# Patient Record
Sex: FEMALE | Race: WHITE | NOT HISPANIC OR LATINO | Employment: OTHER | URBAN - METROPOLITAN AREA
[De-identification: names, ages, dates, MRNs, and addresses within clinical notes are randomized per-mention and may not be internally consistent; named-entity substitution may affect disease eponyms.]

---

## 2017-05-23 ENCOUNTER — TRANSCRIBE ORDERS (OUTPATIENT)
Dept: ADMINISTRATIVE | Facility: HOSPITAL | Age: 82
End: 2017-05-23

## 2017-05-23 DIAGNOSIS — I35.0 AORTIC VALVE STENOSIS, UNSPECIFIED ETIOLOGY: Primary | ICD-10-CM

## 2017-06-21 ENCOUNTER — HOSPITAL ENCOUNTER (OUTPATIENT)
Dept: NON INVASIVE DIAGNOSTICS | Facility: HOSPITAL | Age: 82
Discharge: HOME | End: 2017-06-21
Payer: COMMERCIAL

## 2017-06-21 DIAGNOSIS — I35.0 AORTIC VALVE STENOSIS, UNSPECIFIED ETIOLOGY: ICD-10-CM

## 2017-06-21 PROCEDURE — 93306 TTE W/DOPPLER COMPLETE: CPT

## 2018-05-22 ENCOUNTER — TRANSCRIBE ORDERS (OUTPATIENT)
Dept: ADMINISTRATIVE | Facility: HOSPITAL | Age: 83
End: 2018-05-22

## 2018-05-22 DIAGNOSIS — I35.0 AORTIC VALVE STENOSIS, ETIOLOGY OF CARDIAC VALVE DISEASE UNSPECIFIED: Primary | ICD-10-CM

## 2018-06-07 ENCOUNTER — HOSPITAL ENCOUNTER (OUTPATIENT)
Dept: NON INVASIVE DIAGNOSTICS | Facility: HOSPITAL | Age: 83
Discharge: HOME | End: 2018-06-07
Payer: COMMERCIAL

## 2018-06-07 DIAGNOSIS — I35.0 AORTIC VALVE STENOSIS, ETIOLOGY OF CARDIAC VALVE DISEASE UNSPECIFIED: ICD-10-CM

## 2018-06-07 PROCEDURE — 93306 TTE W/DOPPLER COMPLETE: CPT

## 2018-06-07 PROCEDURE — 93306 TTE W/DOPPLER COMPLETE: CPT | Performed by: INTERNAL MEDICINE

## 2018-07-18 ENCOUNTER — TRANSCRIBE ORDERS (OUTPATIENT)
Dept: ADMINISTRATIVE | Facility: HOSPITAL | Age: 83
End: 2018-07-18

## 2018-07-18 ENCOUNTER — HOSPITAL ENCOUNTER (OUTPATIENT)
Dept: NON INVASIVE DIAGNOSTICS | Facility: HOSPITAL | Age: 83
Discharge: HOME | End: 2018-07-18
Payer: COMMERCIAL

## 2018-07-18 DIAGNOSIS — M79.606 PAIN OF LOWER EXTREMITY, UNSPECIFIED LATERALITY: ICD-10-CM

## 2018-07-18 DIAGNOSIS — R22.42 MASS OF LOWER LEG, LEFT: ICD-10-CM

## 2018-07-18 DIAGNOSIS — M79.606 PAIN OF LOWER EXTREMITY, UNSPECIFIED LATERALITY: Primary | ICD-10-CM

## 2018-07-18 PROCEDURE — 93971 EXTREMITY STUDY: CPT | Performed by: SURGERY

## 2018-07-18 PROCEDURE — 93971 EXTREMITY STUDY: CPT

## 2018-11-28 ENCOUNTER — TRANSCRIBE ORDERS (OUTPATIENT)
Dept: ADMINISTRATIVE | Facility: HOSPITAL | Age: 83
End: 2018-11-28

## 2018-11-28 ENCOUNTER — APPOINTMENT (OUTPATIENT)
Dept: LAB | Facility: CLINIC | Age: 83
End: 2018-11-28
Payer: COMMERCIAL

## 2018-11-28 DIAGNOSIS — I10 ESSENTIAL HYPERTENSION, MALIGNANT: Primary | ICD-10-CM

## 2018-11-28 DIAGNOSIS — K21.9 GASTROESOPHAGEAL REFLUX DISEASE, ESOPHAGITIS PRESENCE NOT SPECIFIED: ICD-10-CM

## 2018-11-28 DIAGNOSIS — I10 ESSENTIAL HYPERTENSION, MALIGNANT: ICD-10-CM

## 2018-11-28 LAB
ALBUMIN SERPL BCP-MCNC: 3.5 G/DL (ref 3.5–5)
ALP SERPL-CCNC: 74 U/L (ref 46–116)
ALT SERPL W P-5'-P-CCNC: 23 U/L (ref 12–78)
ANION GAP SERPL CALCULATED.3IONS-SCNC: 5 MMOL/L (ref 4–13)
AST SERPL W P-5'-P-CCNC: 30 U/L (ref 5–45)
BACTERIA UR QL AUTO: NORMAL /HPF
BASOPHILS # BLD AUTO: 0.04 THOUSANDS/ΜL (ref 0–0.1)
BASOPHILS NFR BLD AUTO: 0 % (ref 0–1)
BILIRUB SERPL-MCNC: 0.33 MG/DL (ref 0.2–1)
BILIRUB UR QL STRIP: NEGATIVE
BUN SERPL-MCNC: 20 MG/DL (ref 5–25)
CALCIUM SERPL-MCNC: 9.8 MG/DL (ref 8.3–10.1)
CHLORIDE SERPL-SCNC: 105 MMOL/L (ref 100–108)
CHOLEST SERPL-MCNC: 147 MG/DL (ref 50–200)
CLARITY UR: CLEAR
CO2 SERPL-SCNC: 26 MMOL/L (ref 21–32)
COLOR UR: YELLOW
CREAT SERPL-MCNC: 1.13 MG/DL (ref 0.6–1.3)
EOSINOPHIL # BLD AUTO: 0.35 THOUSAND/ΜL (ref 0–0.61)
EOSINOPHIL NFR BLD AUTO: 4 % (ref 0–6)
ERYTHROCYTE [DISTWIDTH] IN BLOOD BY AUTOMATED COUNT: 13.7 % (ref 11.6–15.1)
GFR SERPL CREATININE-BSD FRML MDRD: 43 ML/MIN/1.73SQ M
GLUCOSE P FAST SERPL-MCNC: 104 MG/DL (ref 65–99)
GLUCOSE UR STRIP-MCNC: NEGATIVE MG/DL
HCT VFR BLD AUTO: 40.9 % (ref 34.8–46.1)
HDLC SERPL-MCNC: 61 MG/DL (ref 40–60)
HGB BLD-MCNC: 12.9 G/DL (ref 11.5–15.4)
HGB UR QL STRIP.AUTO: NEGATIVE
HYALINE CASTS #/AREA URNS LPF: NORMAL /LPF
IMM GRANULOCYTES # BLD AUTO: 0.03 THOUSAND/UL (ref 0–0.2)
IMM GRANULOCYTES NFR BLD AUTO: 0 % (ref 0–2)
KETONES UR STRIP-MCNC: NEGATIVE MG/DL
LDLC SERPL CALC-MCNC: 73 MG/DL (ref 0–100)
LEUKOCYTE ESTERASE UR QL STRIP: ABNORMAL
LYMPHOCYTES # BLD AUTO: 4.29 THOUSANDS/ΜL (ref 0.6–4.47)
LYMPHOCYTES NFR BLD AUTO: 48 % (ref 14–44)
MCH RBC QN AUTO: 29.5 PG (ref 26.8–34.3)
MCHC RBC AUTO-ENTMCNC: 31.5 G/DL (ref 31.4–37.4)
MCV RBC AUTO: 93 FL (ref 82–98)
MONOCYTES # BLD AUTO: 0.5 THOUSAND/ΜL (ref 0.17–1.22)
MONOCYTES NFR BLD AUTO: 5 % (ref 4–12)
NEUTROPHILS # BLD AUTO: 3.97 THOUSANDS/ΜL (ref 1.85–7.62)
NEUTS SEG NFR BLD AUTO: 43 % (ref 43–75)
NITRITE UR QL STRIP: NEGATIVE
NON-SQ EPI CELLS URNS QL MICRO: NORMAL /HPF
NONHDLC SERPL-MCNC: 86 MG/DL
NRBC BLD AUTO-RTO: 0 /100 WBCS
PH UR STRIP.AUTO: 7 [PH] (ref 4.5–8)
PLATELET # BLD AUTO: 213 THOUSANDS/UL (ref 149–390)
PMV BLD AUTO: 8.9 FL (ref 8.9–12.7)
POTASSIUM SERPL-SCNC: 4.6 MMOL/L (ref 3.5–5.3)
PROT SERPL-MCNC: 7 G/DL (ref 6.4–8.2)
PROT UR STRIP-MCNC: NEGATIVE MG/DL
RBC # BLD AUTO: 4.38 MILLION/UL (ref 3.81–5.12)
RBC #/AREA URNS AUTO: NORMAL /HPF
SODIUM SERPL-SCNC: 136 MMOL/L (ref 136–145)
SP GR UR STRIP.AUTO: 1.01 (ref 1–1.03)
T4 FREE SERPL-MCNC: 1.05 NG/DL (ref 0.76–1.46)
TRIGL SERPL-MCNC: 64 MG/DL
TSH SERPL DL<=0.05 MIU/L-ACNC: 3.99 UIU/ML (ref 0.36–3.74)
UROBILINOGEN UR QL STRIP.AUTO: 0.2 E.U./DL
VIT B12 SERPL-MCNC: 952 PG/ML (ref 100–900)
WBC # BLD AUTO: 9.18 THOUSAND/UL (ref 4.31–10.16)
WBC #/AREA URNS AUTO: NORMAL /HPF

## 2018-11-28 PROCEDURE — 84443 ASSAY THYROID STIM HORMONE: CPT

## 2018-11-28 PROCEDURE — 80053 COMPREHEN METABOLIC PANEL: CPT

## 2018-11-28 PROCEDURE — 80061 LIPID PANEL: CPT

## 2018-11-28 PROCEDURE — 85025 COMPLETE CBC W/AUTO DIFF WBC: CPT

## 2018-11-28 PROCEDURE — 82607 VITAMIN B-12: CPT

## 2018-11-28 PROCEDURE — 84439 ASSAY OF FREE THYROXINE: CPT

## 2018-11-28 PROCEDURE — 82652 VIT D 1 25-DIHYDROXY: CPT

## 2018-11-28 PROCEDURE — 81001 URINALYSIS AUTO W/SCOPE: CPT | Performed by: FAMILY MEDICINE

## 2018-11-28 PROCEDURE — 36415 COLL VENOUS BLD VENIPUNCTURE: CPT

## 2018-11-29 LAB — 1,25(OH)2D3 SERPL-MCNC: 39.6 PG/ML (ref 19.9–79.3)

## 2019-02-01 ENCOUNTER — TRANSCRIBE ORDERS (OUTPATIENT)
Dept: ADMINISTRATIVE | Facility: HOSPITAL | Age: 84
End: 2019-02-01

## 2019-02-01 DIAGNOSIS — M48.07 STENOSIS OF LUMBOSACRAL SPINE: Primary | ICD-10-CM

## 2019-02-05 ENCOUNTER — HOSPITAL ENCOUNTER (OUTPATIENT)
Dept: MRI IMAGING | Facility: HOSPITAL | Age: 84
Discharge: HOME | End: 2019-02-05
Payer: COMMERCIAL

## 2019-02-05 DIAGNOSIS — M48.07 STENOSIS OF LUMBOSACRAL SPINE: ICD-10-CM

## 2019-02-05 PROCEDURE — 72148 MRI LUMBAR SPINE W/O DYE: CPT

## 2019-05-22 ENCOUNTER — TRANSCRIBE ORDERS (OUTPATIENT)
Dept: ADMINISTRATIVE | Facility: HOSPITAL | Age: 84
End: 2019-05-22

## 2019-05-22 DIAGNOSIS — I35.0 AORTIC VALVE STENOSIS, ETIOLOGY OF CARDIAC VALVE DISEASE UNSPECIFIED: Primary | ICD-10-CM

## 2020-02-04 ENCOUNTER — TRANSCRIBE ORDERS (OUTPATIENT)
Dept: ADMINISTRATIVE | Facility: HOSPITAL | Age: 85
End: 2020-02-04

## 2020-02-04 ENCOUNTER — APPOINTMENT (OUTPATIENT)
Dept: LAB | Facility: CLINIC | Age: 85
End: 2020-02-04
Payer: COMMERCIAL

## 2020-02-04 DIAGNOSIS — R73.01 IMPAIRED FASTING GLUCOSE: Primary | ICD-10-CM

## 2020-02-04 DIAGNOSIS — E78.5 HYPERLIPIDEMIA, UNSPECIFIED HYPERLIPIDEMIA TYPE: ICD-10-CM

## 2020-02-04 DIAGNOSIS — E03.9 MYXEDEMA HEART DISEASE: ICD-10-CM

## 2020-02-04 DIAGNOSIS — D64.9 ANEMIA, UNSPECIFIED TYPE: ICD-10-CM

## 2020-02-04 DIAGNOSIS — N39.0 URINARY TRACT INFECTION WITHOUT HEMATURIA, SITE UNSPECIFIED: ICD-10-CM

## 2020-02-04 DIAGNOSIS — I51.9 MYXEDEMA HEART DISEASE: ICD-10-CM

## 2020-02-04 DIAGNOSIS — R73.01 IMPAIRED FASTING GLUCOSE: ICD-10-CM

## 2020-02-04 LAB
25(OH)D3 SERPL-MCNC: 32.4 NG/ML (ref 30–100)
ALBUMIN SERPL BCP-MCNC: 3.8 G/DL (ref 3.5–5)
ALP SERPL-CCNC: 86 U/L (ref 46–116)
ALT SERPL W P-5'-P-CCNC: 28 U/L (ref 12–78)
ANION GAP SERPL CALCULATED.3IONS-SCNC: 7 MMOL/L (ref 4–13)
AST SERPL W P-5'-P-CCNC: 29 U/L (ref 5–45)
BASOPHILS # BLD AUTO: 0.04 THOUSANDS/ΜL (ref 0–0.1)
BASOPHILS NFR BLD AUTO: 1 % (ref 0–1)
BILIRUB SERPL-MCNC: 0.64 MG/DL (ref 0.2–1)
BILIRUB UR QL STRIP: NEGATIVE
BUN SERPL-MCNC: 18 MG/DL (ref 5–25)
CALCIUM SERPL-MCNC: 9.6 MG/DL (ref 8.3–10.1)
CHLORIDE SERPL-SCNC: 112 MMOL/L (ref 100–108)
CHOLEST SERPL-MCNC: 180 MG/DL (ref 50–200)
CLARITY UR: CLEAR
CO2 SERPL-SCNC: 26 MMOL/L (ref 21–32)
COLOR UR: YELLOW
CREAT SERPL-MCNC: 1.11 MG/DL (ref 0.6–1.3)
EOSINOPHIL # BLD AUTO: 0.27 THOUSAND/ΜL (ref 0–0.61)
EOSINOPHIL NFR BLD AUTO: 3 % (ref 0–6)
ERYTHROCYTE [DISTWIDTH] IN BLOOD BY AUTOMATED COUNT: 14.2 % (ref 11.6–15.1)
GFR SERPL CREATININE-BSD FRML MDRD: 44 ML/MIN/1.73SQ M
GLUCOSE P FAST SERPL-MCNC: 104 MG/DL (ref 65–99)
GLUCOSE UR STRIP-MCNC: NEGATIVE MG/DL
HCT VFR BLD AUTO: 42.5 % (ref 34.8–46.1)
HDLC SERPL-MCNC: 65 MG/DL
HGB BLD-MCNC: 13.2 G/DL (ref 11.5–15.4)
HGB UR QL STRIP.AUTO: NEGATIVE
IMM GRANULOCYTES # BLD AUTO: 0.02 THOUSAND/UL (ref 0–0.2)
IMM GRANULOCYTES NFR BLD AUTO: 0 % (ref 0–2)
KETONES UR STRIP-MCNC: NEGATIVE MG/DL
LDLC SERPL CALC-MCNC: 101 MG/DL (ref 0–100)
LEUKOCYTE ESTERASE UR QL STRIP: NEGATIVE
LYMPHOCYTES # BLD AUTO: 4.55 THOUSANDS/ΜL (ref 0.6–4.47)
LYMPHOCYTES NFR BLD AUTO: 52 % (ref 14–44)
MCH RBC QN AUTO: 29.3 PG (ref 26.8–34.3)
MCHC RBC AUTO-ENTMCNC: 31.1 G/DL (ref 31.4–37.4)
MCV RBC AUTO: 94 FL (ref 82–98)
MONOCYTES # BLD AUTO: 0.4 THOUSAND/ΜL (ref 0.17–1.22)
MONOCYTES NFR BLD AUTO: 5 % (ref 4–12)
NEUTROPHILS # BLD AUTO: 3.42 THOUSANDS/ΜL (ref 1.85–7.62)
NEUTS SEG NFR BLD AUTO: 39 % (ref 43–75)
NITRITE UR QL STRIP: NEGATIVE
NONHDLC SERPL-MCNC: 115 MG/DL
NRBC BLD AUTO-RTO: 0 /100 WBCS
PH UR STRIP.AUTO: 7 [PH]
PLATELET # BLD AUTO: 182 THOUSANDS/UL (ref 149–390)
PMV BLD AUTO: 9.2 FL (ref 8.9–12.7)
POTASSIUM SERPL-SCNC: 4.3 MMOL/L (ref 3.5–5.3)
PROT SERPL-MCNC: 7.1 G/DL (ref 6.4–8.2)
PROT UR STRIP-MCNC: NEGATIVE MG/DL
RBC # BLD AUTO: 4.5 MILLION/UL (ref 3.81–5.12)
SODIUM SERPL-SCNC: 145 MMOL/L (ref 136–145)
SP GR UR STRIP.AUTO: 1.02 (ref 1–1.03)
T4 SERPL-MCNC: 10.1 UG/DL (ref 4.7–13.3)
TRIGL SERPL-MCNC: 69 MG/DL
TSH SERPL DL<=0.05 MIU/L-ACNC: 2.97 UIU/ML (ref 0.36–3.74)
UROBILINOGEN UR QL STRIP.AUTO: 0.2 E.U./DL
VIT B12 SERPL-MCNC: 795 PG/ML (ref 100–900)
WBC # BLD AUTO: 8.7 THOUSAND/UL (ref 4.31–10.16)

## 2020-02-04 PROCEDURE — 85025 COMPLETE CBC W/AUTO DIFF WBC: CPT

## 2020-02-04 PROCEDURE — 82306 VITAMIN D 25 HYDROXY: CPT

## 2020-02-04 PROCEDURE — 84436 ASSAY OF TOTAL THYROXINE: CPT

## 2020-02-04 PROCEDURE — 36415 COLL VENOUS BLD VENIPUNCTURE: CPT

## 2020-02-04 PROCEDURE — 84443 ASSAY THYROID STIM HORMONE: CPT

## 2020-02-04 PROCEDURE — 81003 URINALYSIS AUTO W/O SCOPE: CPT | Performed by: FAMILY MEDICINE

## 2020-02-04 PROCEDURE — 80061 LIPID PANEL: CPT

## 2020-02-04 PROCEDURE — 80053 COMPREHEN METABOLIC PANEL: CPT

## 2020-02-04 PROCEDURE — 82607 VITAMIN B-12: CPT

## 2020-12-02 DIAGNOSIS — Z20.828 EXPOSURE TO SARS-ASSOCIATED CORONAVIRUS: ICD-10-CM

## 2020-12-02 PROCEDURE — U0003 INFECTIOUS AGENT DETECTION BY NUCLEIC ACID (DNA OR RNA); SEVERE ACUTE RESPIRATORY SYNDROME CORONAVIRUS 2 (SARS-COV-2) (CORONAVIRUS DISEASE [COVID-19]), AMPLIFIED PROBE TECHNIQUE, MAKING USE OF HIGH THROUGHPUT TECHNOLOGIES AS DESCRIBED BY CMS-2020-01-R: HCPCS | Performed by: NURSE PRACTITIONER

## 2020-12-05 LAB — SARS-COV-2 RNA SPEC QL NAA+PROBE: NOT DETECTED

## 2021-02-12 DIAGNOSIS — Z23 ENCOUNTER FOR IMMUNIZATION: ICD-10-CM

## 2022-11-07 ENCOUNTER — APPOINTMENT (EMERGENCY)
Dept: CT IMAGING | Facility: HOSPITAL | Age: 87
End: 2022-11-07

## 2022-11-07 ENCOUNTER — HOSPITAL ENCOUNTER (EMERGENCY)
Facility: HOSPITAL | Age: 87
Discharge: HOME | End: 2022-11-07
Attending: EMERGENCY MEDICINE

## 2022-11-07 ENCOUNTER — APPOINTMENT (EMERGENCY)
Dept: RADIOLOGY | Facility: HOSPITAL | Age: 87
End: 2022-11-07

## 2022-11-07 VITALS
OXYGEN SATURATION: 96 % | SYSTOLIC BLOOD PRESSURE: 148 MMHG | RESPIRATION RATE: 16 BRPM | HEART RATE: 68 BPM | BODY MASS INDEX: 25.32 KG/M2 | DIASTOLIC BLOOD PRESSURE: 72 MMHG | WEIGHT: 129 LBS | TEMPERATURE: 98 F | HEIGHT: 60 IN

## 2022-11-07 DIAGNOSIS — S42.91XA SHOULDER FRACTURE, RIGHT, CLOSED, INITIAL ENCOUNTER: ICD-10-CM
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
## 2022-11-07 DIAGNOSIS — S09.90XA INJURY OF HEAD, INITIAL ENCOUNTER: ICD-10-CM

## 2022-11-07 LAB
ATRIAL RATE: 70 BPM
P AXIS: 53 DEGREES
PR INTERVAL: 174 MS
QRS AXIS: -46 DEGREES
QRSD INTERVAL: 88 MS
QT INTERVAL: 422 MS
QTC INTERVAL: 455 MS
T WAVE AXIS: 84 DEGREES
VENTRICULAR RATE: 70 BPM

## 2022-11-07 RX ORDER — ACETAMINOPHEN 325 MG/1
975 TABLET ORAL ONCE
Status: COMPLETED | OUTPATIENT
Start: 2022-11-07 | End: 2022-11-07

## 2022-11-07 RX ADMIN — ACETAMINOPHEN 975 MG: 325 TABLET, FILM COATED ORAL at 13:36

## 2022-11-07 NOTE — ED PROVIDER NOTES
Emergency Department Trauma Note  Giorgio Smallwood 80 y o  female MRN: [id number]  Unit/Bed#: ED 12/ED 12 Encounter: [id number]      Trauma Alert: Trauma Acuity: Trauma Evaluation  Model of Arrival: Mode of Arrival: BLS via Trauma Squad Name and Number: [id number]  Trauma Team: Current Providers  Attending Provider: Radha Loving DO  Registered Nurse: Sina Guerrero RN  Consultants:     None      History of Present Illness     Chief Complaint:   Chief Complaint   Patient presents with   • Fall     Pt presents to ED from home after falling at home 1320 today      HPI:  Giorgio Smallwood is a 80 y o  female who presents with fall  Mechanism:Details of Incident: pt reports tripping over wash basket at home Injury Date: 11/07/22 Injury Time: 1320 Injury Occurence Location - [address] [place] [address] Way: [address] [facility]    Patient brought in by ambulance from home for fall just pta - tripped over laundry basket - did not hit head or pass out  Patient complains of right shoulder pain  Pain is toward back of shoulder worse with movement  She was assisted up by paramedics  She did not get anything yet for pain  GCS 15  Breath sounds equal  No crepitus or chest wall tenderness with compression over the chest  No pain or instability with compression over the pelvis  No bedside imaging required  Patient is stable to proceed to CT scan  Review of Systems   Constitutional: Negative for chills and fever  HENT: Negative for rhinorrhea and sore throat  Respiratory: Negative for shortness of breath  Cardiovascular: Negative for chest pain  Gastrointestinal: Negative for constipation, diarrhea, nausea and vomiting  Genitourinary: Negative for dysuria and frequency  Skin: Negative for rash  All other systems reviewed and are negative        Historical Information     Immunizations:   Immunization History   Administered Date(s) Administered   • COVID-19 MODERNA VACC 0 5 ML IM 01/26/2021, 03/05/2021       Past Medical History:   Diagnosis Date   • Cancer ([facility] [address] )     Cervical, Kidney, melanoma Per MRI Screening form 2/5/19     History reviewed  No pertinent family history  History reviewed  No pertinent surgical history  Social History     Tobacco Use   • Smoking status: Never Smoker   • Smokeless tobacco: Never Used   Substance Use Topics   • Drug use: Not Currently     E-Cigarette/Vaping     E-Cigarette/Vaping Substances   • Nicotine No    • THC No    • CBD No    • Flavoring No    • Other No    • Unknown No        Family History: non-contributory    Meds/Allergies   None       No Known Allergies    PHYSICAL EXAM    PE limited by: nothing    Objective   Vitals:   First set: Temperature: 98 °F (36 7 °C) (11/07/22 1315)  Pulse: 83 (11/07/22 1313)  Respirations: 16 (11/07/22 1313)  Blood Pressure: 148/72 (11/07/22 1315)  SpO2: 95 % (11/07/22 1313)    Primary Survey:   (A) Airway: patent  (B) Breathing: clear  (C) Circulation: Pulses:   normal  (D) Disabliity:  GCS Total:  15  (E) Expose:  Completed    Secondary Survey: (Click on Physical Exam tab above)  Physical Exam  Vitals and nursing note reviewed  Constitutional:       Appearance: She is well-developed  HENT:      Head: Normocephalic and atraumatic  Right Ear: External ear normal       Left Ear: External ear normal       Nose: Nose normal    Eyes:      Conjunctiva/sclera: Conjunctivae normal       Pupils: Pupils are equal, round, and reactive to light  Cardiovascular:      Rate and Rhythm: Normal rate and regular rhythm  Heart sounds: Normal heart sounds  Pulmonary:      Effort: Pulmonary effort is normal  No respiratory distress  Breath sounds: Normal breath sounds  No wheezing  Abdominal:      General: Bowel sounds are normal  There is no distension  Palpations: Abdomen is soft  Tenderness: There is no abdominal tenderness  Musculoskeletal:         General: No deformity  Right shoulder: Tenderness present  Decreased range of motion  Normal pulse  Left shoulder: Normal       Right elbow: Normal       Right wrist: Normal pulse  Cervical back: Normal range of motion and neck supple  No bony tenderness  No spinous process tenderness  Thoracic back: No bony tenderness  Lumbar back: No bony tenderness  Skin:     General: Skin is warm and dry  Findings: No rash  Neurological:      General: No focal deficit present  Mental Status: She is alert  GCS: GCS eye subscore is 4  GCS verbal subscore is 5  GCS motor subscore is 6  Sensory: No sensory deficit  Psychiatric:         Mood and Affect: Mood normal          Cervical spine cleared by clinical criteria? Yes     Invasive Devices  Report    None                 Lab Results:   Results Reviewed     None                 Imaging Studies:   Direct to CT: Yes  TRAUMA - CT head wo contrast   Final Result by Sindy Harmon MD (11/07 1426)      No acute intracranial abnormality  Chronic microangiopathic changes  Small scalp hematoma in right parietotemporal region  The study was marked in [facility] for immediate notification                    Workstation performed: [id number]         XR shoulder 2+ views RIGHT   ED Interpretation by Bhupendra Lima DO (11/07 1421)   Surgical neck humerus fx            Procedures  Procedures         ED Course           MDM  Number of Diagnoses or Management Options  Fall, initial encounter: new and requires workup  Injury of head, initial encounter: new and requires workup  Shoulder fracture, right, closed, initial encounter: new and requires workup     Amount and/or Complexity of Data Reviewed  Tests in the radiology section of CPT®: ordered and reviewed    Patient Progress  Patient progress: improved          Disposition  [facility] Transfer: No  Final diagnoses:   Fall, initial encounter   Injury of head, initial encounter   Shoulder fracture, right, closed, initial encounter     Time reflects when diagnosis was documented in both MDM as applicable and the Disposition within this note     Time User Action Codes Description Comment    11/7/2022  2:39 PM iTmmy Li Add [O87  NTEJ] Fall, initial encounter     11/7/2022  2:39 PM Timmy Li Add [Y59 [id number]] Injury of head, initial encounter     11/7/2022  2:39 PM Rafael, [address] Shoulder fracture, right, closed, initial encounter       ED Disposition     ED Disposition   Discharge    Condition   Stable    Date/Time   Mon Nov 7, 2022  2:39 PM    [address] discharge to home/self care  Follow-up Information     Follow up With Specialties Details Why Contact Info Additional [address] [facility] Specialists [facility] Orthopedic Surgery Schedule an appointment as soon as possible for a visit   [address] [place] [phone]  [address] [place] [address] Specialists [facility], [address] [place] [address], [facility] [address]        There are no discharge medications for this patient          PDMP Review     None          ED Provider  Electronically Signed by         Laurence Roberts DO  11/07/22 6627

## 2022-11-08 ENCOUNTER — OFFICE VISIT (OUTPATIENT)
Dept: OBGYN CLINIC | Facility: CLINIC | Age: 87
End: 2022-11-08

## 2022-11-08 VITALS
SYSTOLIC BLOOD PRESSURE: 130 MMHG | HEIGHT: 60 IN | DIASTOLIC BLOOD PRESSURE: 72 MMHG | WEIGHT: 129 LBS | BODY MASS INDEX: 25.32 KG/M2

## 2022-11-08 DIAGNOSIS — S42.201A CLOSED FRACTURE OF PROXIMAL END OF RIGHT HUMERUS, UNSPECIFIED FRACTURE MORPHOLOGY, INITIAL ENCOUNTER: Primary | ICD-10-CM

## 2022-11-08 DIAGNOSIS — S42.91XA SHOULDER FRACTURE, RIGHT, CLOSED, INITIAL ENCOUNTER: ICD-10-CM

## 2022-11-08 NOTE — RESULT ENCOUNTER NOTE
Attempted to call to inform of age-indeterminate posterior 8th rib fracture  No answer  LMOM  Has appt to see ortho today   Will cc results to ortho

## 2022-11-08 NOTE — PROGRESS NOTES
Assessment:     1  Closed fracture of proximal end of right humerus, unspecified fracture morphology, initial encounter    2  Shoulder fracture, right, closed, initial encounter        Plan:     Problem List Items Addressed This Visit        Musculoskeletal and Integument    Closed fracture of right proximal humerus - Primary     Findings are consistent with right proximal humerus fracture  I discussed with the patient that given the fracture alignment this should heal well non operatively  She should continue to wear the sling full-time and is able to take it off from time to time for gentle elbow range of motion exercises  I discussed with the patient that she can also put a pillow behind her shoulder as inclined will provide her increased comfort  She can continue to ice and use over-the-counter medication for pain relief  She is to follow up in approximately 3 weeks for re-evaluation and repeat x-rays of right shoulder which at that time he may begin gentle shoulder range of motion exercises including pendulums  All patient's questions were answered to their satisfaction  This note is created using dictation transcription  It may contain typographical errors, grammatical errors, improperly dictated words, background noise and other errors  Relevant Orders    Fracture / Dislocation Treatment      Other Visit Diagnoses     Shoulder fracture, right, closed, initial encounter              Subjective:     Patient ID: Gio Murdock is a 80 y o  female  Chief Complaint:  80year-old right-hand dominant female presents today for initial evaluation right shoulder pain  She presents in the office today with her daughter  She states yesterday 11/07/2022 she fell and landed on her right shoulder outstretched  She states that immediately following the fall she did feel a crack followed by significant pain    She did go to the emergency department following the fall who provided her with a sling which she has been compliant with and told her that she had a proximal humerus fracture  They also provided her with pain medication  She states that the pain has improved since yesterday  She does report some bruising and ecchymosis along the posterior aspect of her shoulder  She denies any numbness or tingling  She denies any prior injury or trauma to her shoulder  Information on patient's intake form was reviewed  Allergy:  No Known Allergies  Medications:  all current active meds have been reviewed  Past Medical History:  Past Medical History:   Diagnosis Date   • Cancer ([facility] [address] )     Cervical, Kidney, melanoma Per MRI Screening form 2/5/19     Past Surgical History:  History reviewed  No pertinent surgical history  Family History:  History reviewed  No pertinent family history  Social History:  Social History     Substance and Sexual Activity   Alcohol Use None     Social History     Substance and Sexual Activity   Drug Use Not Currently     Social History     Tobacco Use   Smoking Status Never Smoker   Smokeless Tobacco Never Used     Review of Systems   Constitutional: Negative for chills and fever  HENT: Negative for ear pain and sore throat  Eyes: Negative for pain and visual disturbance  Respiratory: Negative for cough and shortness of breath  Cardiovascular: Negative for chest pain and palpitations  Gastrointestinal: Negative for abdominal pain and vomiting  Genitourinary: Negative for dysuria and hematuria  Musculoskeletal: Positive for arthralgias (Right shoulder) and joint swelling (Right shoulder)  Negative for back pain  Skin: Negative for color change and rash  Neurological: Negative for seizures and syncope  Psychiatric/Behavioral: Negative  All other systems reviewed and are negative          Objective:  BP Readings from Last 1 Encounters:   11/08/22 130/72      Wt Readings from Last 1 Encounters:   11/08/22 58 5 kg (129 lb)      BMI:   Estimated body mass index is 25 19 kg/m² as calculated from the following:    Height as of this encounter: 5' (1 524 m)  Weight as of this encounter: 58 5 kg (129 lb)  BSA:   Estimated body surface area is 1 55 meters squared as calculated from the following:    Height as of this encounter: 5' (1 524 m)  Weight as of this encounter: 58 5 kg (129 lb)  Physical Exam  Vitals and nursing note reviewed  Constitutional:       Appearance: Normal appearance  She is well-developed  HENT:      Head: Normocephalic and atraumatic  Right Ear: External ear normal       Left Ear: External ear normal       Nose: Nose normal    Eyes:      Extraocular Movements: Extraocular movements intact  Conjunctiva/sclera: Conjunctivae normal    Pulmonary:      Effort: Pulmonary effort is normal    Musculoskeletal:      Cervical back: Neck supple  Comments: See HPI   Skin:     General: Skin is warm and dry  Neurological:      Mental Status: She is alert and oriented to person, place, and time  Deep Tendon Reflexes: Reflexes are normal and symmetric  Psychiatric:         Mood and Affect: Mood normal          Behavior: Behavior normal          Right Shoulder Exam     Tenderness   Right shoulder tenderness location: Humeral head  Other   Erythema: absent  Scars: absent  Sensation: normal  Pulse: present    Comments:  Strength and ROM not tested            I have personally reviewed pertinent films in PACS and my interpretation is Comminuted right proximal humerus neck and head intra-articular minimally displaced fracture  Fracture / Dislocation Treatment    Date/Time: 11/10/2022 10:35 AM  Performed by: Evert Guy MD  Authorized by: Evert Guy MD     Patient Location:  [facility]  [place] Protocol:  Consent: Verbal consent obtained    Risks and benefits: risks, benefits and alternatives were discussed  Consent given by: patient  Patient understanding: patient states understanding of the procedure being performed      Injury location: Shoulder  Location details:  Right shoulder  Injury type:  Fracture  Fracture type: anatomical neck    Neurovascular status: Neurovascularly intact    Distal perfusion: normal    Neurological function: normal    Range of motion: reduced    Local anesthesia used?: No    Manipulation performed?: No    Immobilization:  Sling  Neurovascular status: Neurovascularly intact    Distal perfusion: normal    Neurological function: normal    Range of motion: unchanged    Patient tolerance:  Patient tolerated the procedure well with no immediate complications

## 2022-11-08 NOTE — LETTER
November 11, 2022     Lasha Whitmore MD  [facility] [address]  [address]  [address]    Patient: Mariel Trejo   YOB: 1928   Date of Visit: 11/8/2022       Dear Dr Daily Ortiz: Thank you for referring Tirso Akers to me for evaluation  Below are my notes for this consultation  If you have questions, please do not hesitate to call me  I look forward to following your patient along with you  Sincerely,        Kelley Chin MD        CC: No Recipients  Kelley Chin MD  11/10/2022 10:36 AM  Signed  Assessment:     1  Closed fracture of proximal end of right humerus, unspecified fracture morphology, initial encounter    2  Shoulder fracture, right, closed, initial encounter        Plan:     Problem List Items Addressed This Visit        Musculoskeletal and Integument    Closed fracture of right proximal humerus - Primary     Findings are consistent with right proximal humerus fracture  I discussed with the patient that given the fracture alignment this should heal well non operatively  She should continue to wear the sling full-time and is able to take it off from time to time for gentle elbow range of motion exercises  I discussed with the patient that she can also put a pillow behind her shoulder as inclined will provide her increased comfort  She can continue to ice and use over-the-counter medication for pain relief  She is to follow up in approximately 3 weeks for re-evaluation and repeat x-rays of right shoulder which at that time he may begin gentle shoulder range of motion exercises including pendulums  All patient's questions were answered to their satisfaction  This note is created using dictation transcription  It may contain typographical errors, grammatical errors, improperly dictated words, background noise and other errors           Relevant Orders    Fracture / Dislocation Treatment      Other Visit Diagnoses     Shoulder fracture, right, closed, initial encounter              Subjective:     Patient ID: Blaire Walker is a 80 y o  female  Chief Complaint:  80year-old right-hand dominant female presents today for initial evaluation right shoulder pain  She presents in the office today with her daughter  She states yesterday 11/07/2022 she fell and landed on her right shoulder outstretched  She states that immediately following the fall she did feel a crack followed by significant pain  She did go to the emergency department following the fall who provided her with a sling which she has been compliant with and told her that she had a proximal humerus fracture  They also provided her with pain medication  She states that the pain has improved since yesterday  She does report some bruising and ecchymosis along the posterior aspect of her shoulder  She denies any numbness or tingling  She denies any prior injury or trauma to her shoulder  Information on patient's intake form was reviewed  Allergy:  No Known Allergies  Medications:  all current active meds have been reviewed  Past Medical History:  Past Medical History:   Diagnosis Date   • [facility]     Cervical, Kidney, melanoma Per MRI Screening form 2/5/19     Past Surgical History:  History reviewed  No pertinent surgical history  Family History:  History reviewed  No pertinent family history  Social History:  Social History     Substance and Sexual Activity   Alcohol Use None     Social History     Substance and Sexual Activity   Drug Use Not Currently     Social History     Tobacco Use   Smoking Status Never Smoker   Smokeless Tobacco Never Used     Review of Systems   Constitutional: Negative for chills and fever  HENT: Negative for ear pain and sore throat  Eyes: Negative for pain and visual disturbance  Respiratory: Negative for cough and shortness of breath  Cardiovascular: Negative for chest pain and palpitations     Gastrointestinal: Negative for abdominal pain and vomiting  Genitourinary: Negative for dysuria and hematuria  Musculoskeletal: Positive for arthralgias (Right shoulder) and joint swelling (Right shoulder)  Negative for back pain  Skin: Negative for color change and rash  Neurological: Negative for seizures and syncope  Psychiatric/Behavioral: Negative  All other systems reviewed and are negative  Objective:  BP Readings from Last 1 Encounters:   11/08/22 130/72      Wt Readings from Last 1 Encounters:   11/08/22 58 5 kg (129 lb)      BMI:   Estimated body mass index is 25 19 kg/m² as calculated from the following:    Height as of this encounter: 5' (1 524 m)  Weight as of this encounter: 58 5 kg (129 lb)  BSA:   Estimated body surface area is 1 55 meters squared as calculated from the following:    Height as of this encounter: 5' (1 524 m)  Weight as of this encounter: 58 5 kg (129 lb)  Physical Exam  Vitals and nursing note reviewed  Constitutional:       Appearance: Normal appearance  She is well-developed  HENT:      Head: Normocephalic and atraumatic  Right Ear: External ear normal       Left Ear: External ear normal       Nose: Nose normal    Eyes:      Extraocular Movements: Extraocular movements intact  Conjunctiva/sclera: Conjunctivae normal    Pulmonary:      Effort: Pulmonary effort is normal    Musculoskeletal:      Cervical back: Neck supple  Comments: See HPI   Skin:     General: Skin is warm and dry  Neurological:      Mental Status: She is alert and oriented to person, place, and time  Deep Tendon Reflexes: Reflexes are normal and symmetric  Psychiatric:         Mood and Affect: Mood normal          Behavior: Behavior normal          Right Shoulder Exam     Tenderness   Right shoulder tenderness location: Humeral head      Other   Erythema: absent  Scars: absent  Sensation: normal  Pulse: present    Comments:  Strength and ROM not tested            I have personally reviewed pertinent films in PACS and my interpretation is Comminuted right proximal humerus neck and head intra-articular minimally displaced fracture  Fracture / Dislocation Treatment    Date/Time: 11/10/2022 10:35 AM  Performed by: Kelley Chin MD  Authorized by: Kelley Chin MD     Patient Location:  [facility] Protocol:  Consent: Verbal consent obtained    Risks and benefits: risks, benefits and alternatives were discussed  Consent given by: patient  Patient understanding: patient states understanding of the procedure being performed      Injury location:  Shoulder  Location details:  Right shoulder  Injury type:  Fracture  Fracture type: anatomical neck    Neurovascular status: Neurovascularly intact    Distal perfusion: normal    Neurological function: normal    Range of motion: reduced    Local anesthesia used?: No    Manipulation performed?: No    Immobilization:  Sling  Neurovascular status: Neurovascularly intact    Distal perfusion: normal    Neurological function: normal    Range of motion: unchanged    Patient tolerance:  Patient tolerated the procedure well with no immediate complications

## 2022-11-10 PROBLEM — S42.201A CLOSED FRACTURE OF RIGHT PROXIMAL HUMERUS: Status: ACTIVE | Noted: 2022-11-10

## 2022-11-10 NOTE — ASSESSMENT & PLAN NOTE
Findings are consistent with right proximal humerus fracture  I discussed with the patient that given the fracture alignment this should heal well non operatively  She should continue to wear the sling full-time and is able to take it off from time to time for gentle elbow range of motion exercises  I discussed with the patient that she can also put a pillow behind her shoulder as inclined will provide her increased comfort  She can continue to ice and use over-the-counter medication for pain relief  She is to follow up in approximately 3 weeks for re-evaluation and repeat x-rays of right shoulder which at that time he may begin gentle shoulder range of motion exercises including pendulums  All patient's questions were answered to their satisfaction  This note is created using dictation transcription  It may contain typographical errors, grammatical errors, improperly dictated words, background noise and other errors

## 2022-12-01 ENCOUNTER — APPOINTMENT (OUTPATIENT)
Dept: RADIOLOGY | Facility: CLINIC | Age: 87
End: 2022-12-01

## 2022-12-01 ENCOUNTER — OFFICE VISIT (OUTPATIENT)
Dept: OBGYN CLINIC | Facility: CLINIC | Age: 87
End: 2022-12-01

## 2022-12-01 VITALS
BODY MASS INDEX: 24.94 KG/M2 | WEIGHT: 127 LBS | DIASTOLIC BLOOD PRESSURE: 80 MMHG | HEIGHT: 60 IN | SYSTOLIC BLOOD PRESSURE: 118 MMHG

## 2022-12-01 DIAGNOSIS — S42.201D CLOSED FRACTURE OF PROXIMAL END OF RIGHT HUMERUS WITH ROUTINE HEALING, UNSPECIFIED FRACTURE MORPHOLOGY, SUBSEQUENT ENCOUNTER: ICD-10-CM

## 2022-12-01 DIAGNOSIS — M25.531 PAIN IN RIGHT WRIST: ICD-10-CM

## 2022-12-01 DIAGNOSIS — S42.201D CLOSED FRACTURE OF PROXIMAL END OF RIGHT HUMERUS WITH ROUTINE HEALING, UNSPECIFIED FRACTURE MORPHOLOGY, SUBSEQUENT ENCOUNTER: Primary | ICD-10-CM

## 2022-12-01 DIAGNOSIS — M19.031 PRIMARY OSTEOARTHRITIS OF RIGHT WRIST: ICD-10-CM

## 2022-12-01 DIAGNOSIS — M18.11 ARTHRITIS OF CARPOMETACARPAL (CMC) JOINT OF RIGHT THUMB: ICD-10-CM

## 2022-12-01 NOTE — PROGRESS NOTES
Assessment:     1  Closed fracture of proximal end of right humerus with routine healing, unspecified fracture morphology, subsequent encounter    2  Arthritis of carpometacarpal (CMC) joint of right thumb    3  Primary osteoarthritis of right wrist    4  Pain in right wrist        Plan:     Problem List Items Addressed This Visit        Musculoskeletal and Integument    Closed fracture of right proximal humerus - Primary    Relevant Orders    XR shoulder 2+ vw right    Arthritis of carpometacarpal (CMC) joint of right thumb    Relevant Orders    Ambulatory Referral to PT/OT Hand Therapy    Primary osteoarthritis of right wrist    Relevant Orders    Ambulatory Referral to PT/OT Hand Therapy   Other Visit Diagnoses     Pain in right wrist        Relevant Orders    XR wrist 3+ vw right        Findings consistent with right proximal humerus fracture, right wrist, and right thumb CMC osteoarthritis  Discussed findings and treatment options with the patient  X-rays of the right shoulder was reviewed in the office today which showed fracture alignment maintained proximal humerus fracture  X-rays of the right wrist was reviewed in the office today as well which showed degenerative changes and no acute fractures or dislocations  Patient is to wear protective sling for the next 3 weeks  She was advised to come out of the sling and do pendulum exercises  She is to continue moving her right wrist and fingers to prevent stiffness  Hand therapy script was given out today for range of motion exercises to the right wrist to help with the pain  and swelling  Recommended patient to use Aspercreme and or Voltaren gel over the wrist for pain relief  Will repeat x-rays of the right shoulder at the next office visit  She is to follow up in 3 weeks  All patient's questions were answered to their satisfaction  This note is created using dictation transcription    It may contain typographical errors, grammatical errors, improperly dictated words, background noise and other errors  Subjective:     Patient ID: Blayne Cormier is a 80 y o  female  Chief Complaint:  80year old female presents today follow up for right proximal humerus fracture, DOI 11/7/22  She has been in the protective sling at all times and comes out of the sling to do range of motion exercises to the elbow  She states no pain in the shoulder region but states in the last couple of days she has been having swelling and pain in the wrist and fingers  She was icing at times  She is taking Tylenol for pain relief  Allergy:  No Known Allergies  Medications:  all current active meds have been reviewed  Past Medical History:  Past Medical History:   Diagnosis Date   • [facility]     Cervical, Kidney, melanoma Per MRI Screening form 2/5/19     Past Surgical History:  History reviewed  No pertinent surgical history  Family History:  History reviewed  No pertinent family history  Social History:  Social History     Substance and Sexual Activity   Alcohol Use None     Social History     Substance and Sexual Activity   Drug Use Not Currently     Social History     Tobacco Use   Smoking Status Never   Smokeless Tobacco Never     Review of Systems   Constitutional: Negative for chills and fever  HENT: Negative for ear pain and sore throat  Eyes: Negative for pain and visual disturbance  Respiratory: Negative for cough and shortness of breath  Cardiovascular: Negative for chest pain and palpitations  Gastrointestinal: Negative for abdominal pain and vomiting  Genitourinary: Negative for dysuria and hematuria  Musculoskeletal: Positive for arthralgias (Right wrist) and joint swelling (Right wrist and hand)  Skin: Negative for color change and rash  Neurological: Negative for seizures and syncope  Psychiatric/Behavioral: Negative  All other systems reviewed and are negative          Objective:  BP Readings from Last 1 Encounters:   12/01/22 118/80 Wt Readings from Last 1 Encounters:   12/01/22 57 6 kg (127 lb)      BMI:   Estimated body mass index is 24 8 kg/m² as calculated from the following:    Height as of this encounter: 5' (1 524 m)  Weight as of this encounter: 57 6 kg (127 lb)  BSA:   Estimated body surface area is 1 54 meters squared as calculated from the following:    Height as of this encounter: 5' (1 524 m)  Weight as of this encounter: 57 6 kg (127 lb)  Physical Exam  Vitals and nursing note reviewed  Constitutional:       Appearance: Normal appearance  She is well-developed and well-nourished  HENT:      Head: Normocephalic and atraumatic  Right Ear: External ear normal       Left Ear: External ear normal    Eyes:      Extraocular Movements: Extraocular movements intact  Conjunctiva/sclera: Conjunctivae normal    Pulmonary:      Effort: Pulmonary effort is normal    Musculoskeletal:      Cervical back: Neck supple  Skin:     General: Skin is warm and dry  Neurological:      Mental Status: She is alert and oriented to person, place, and time  Deep Tendon Reflexes: Reflexes are normal and symmetric  Psychiatric:         Mood and Affect: Mood and affect and mood normal          Behavior: Behavior normal        Right Hand Exam     Tenderness   The patient is experiencing tenderness in the dorsal area (Right thumb CMC )  Range of Motion   Wrist   Extension: abnormal Right wrist extension: Pain  Flexion: abnormal Right wrist flexion: Pain  Pronation: normal   Supination: abnormal Right wrist supination: Pain  Other   Erythema: absent  Sensation: normal  Pulse: present    Comments:  Generalized swelling wrist and finger region       Right Shoulder Exam     Tenderness   The patient is experiencing no tenderness      Other   Erythema: absent  Sensation: normal  Pulse: present    Comments:  Range of motion and strength not tested due to known fracture            I have personally reviewed pertinent films in PACS and my interpretation is x-ray right shoulder demonstrate proximal humerus fractures,, alignment maintained  x-ray right wrist demonstrates degernative changes, CMC arthritis and no acute fractures or dislocations        Scribe Attestation    I,:  Vern Shaver am acting as a scribe while in the presence of the attending physician :       I,:  Manisha Dangelo MD personally performed the services described in this documentation    as scribed in my presence :

## 2022-12-06 ENCOUNTER — EVALUATION (OUTPATIENT)
Dept: OCCUPATIONAL THERAPY | Facility: CLINIC | Age: 87
End: 2022-12-06

## 2022-12-06 DIAGNOSIS — M18.11 ARTHRITIS OF CARPOMETACARPAL (CMC) JOINT OF RIGHT THUMB: ICD-10-CM

## 2022-12-06 DIAGNOSIS — M19.031 PRIMARY OSTEOARTHRITIS OF RIGHT WRIST: Primary | ICD-10-CM

## 2022-12-06 NOTE — PROGRESS NOTES
OT Evaluation     Today's date: 2022  Patient name: Cate Springer  : 3/12/1928  MRN: [id number]  Referring provider: Linn Kehr, MD  Dx:   Encounter Diagnosis     ICD-10-CM    1  Arthritis of carpometacarpal [facility]) joint of right thumb  M18 11 Ambulatory Referral to PT/OT Hand Therapy      2  Primary osteoarthritis of right wrist  M19 031 Ambulatory Referral to PT/OT Hand Therapy                     Assessment  Assessment details: Lissett Leyva presents with R wrist and CMC OA that worsened after a fall where she fx her humerus   She is wearing a sling that she said she is supposed to wear 3 more weeks   She is doing pendulum  Exercises  She has decreased wrist , digit and thumb ROM   She has Aia 16 contracture with MCP hyperext  She has limited dexterity and gross grasp   She is unable to use R for ADL   She lives alone  in an apartment   Her daughter comes over to assist in bathing and dressing  She is limited by her acute fx as well as OA  She would benefit from OT to improve ROM, decrease edema , improve strength and learn to use adaptive devices   She should benefit from a Aia 16 orthosis to reduce stress from th sling and a compression glove    Impairments: abnormal or restricted ROM, impaired physical strength, lacks appropriate home exercise program and pain with function  Functional limitations: unable to use R for self care, meal prep , housekeeping , opening bottles , jars  Symptom irritability: moderateUnderstanding of Dx/Px/POC: fair   Prognosis: fair    Goals  STG- 1 wk  1-I with HEP  2- improve digit ROM 1 cm to [facility]    LTG- 4 wks  1- I ADL - bottles , jars using oper  2- worst pain 2/10  3- improve R  to 8#  4- improve R pinches to 3#      Plan  Patient would benefit from: OT eval and custom splinting  Planned modality interventions: cryotherapy and thermotherapy: hydrocollator packs  Planned therapy interventions: activity modification, manual therapy, massage, joint mobilization, stretching, fine motor coordination training, home exercise program and orthotic fitting/training  Frequency: 2x week  Duration in weeks: 5  Plan of Care beginning date: 2022  Treatment plan discussed with: patient        Subjective Evaluation    History of Present Illness  Date of onset: 2022  Mechanism of injury: trauma  Mechanism of injury: Ragini fell and Fx R humerus   She started having hand and wrist pain after falling   She was found to have wrist and CMC OA    Quality of life: good    Pain  Current pain ratin  At best pain ratin  At worst pain ratin  Location: R wrist and thumb CMC  Quality: dull ache  Relieving factors: ice  Progression: improved    Social Support  Steps to enter house: no  Stairs in house: no   Lives in: apartment  Lives with: alone    Employment status: not working  Hand dominance: right    Treatments  No previous or current treatments  Patient Goals  Patient goals for therapy: decreased pain, decreased edema, increased strength, independence with ADLs/IADLs and increased motion          Objective     Observations     Additional Observation Details  Gross edema R hand ; DJD IP jts ; thumb in swan neck     Active Range of Motion     Right Wrist   Wrist flexion: 25 degrees   Wrist extension: 30 degrees   Radial deviation: 10 degrees   Ulnar deviation: 20 degrees     Right Thumb   Flexion     MP: 0    DIP: 10  Extension     MP: -40  Radial Abduction    CMC: 40  Opposition: CMC contracture in flexion with hyperext at MCP   OPP tip of middle    Additional Active Range of Motion Details  R P/S 60//50  Digit flexion 3 cm to [facility]     Strength/Myotome Testing     Left Wrist/Hand      (2nd hand position)     Trial 1: 18    Thumb Strength  Key/Lateral Pinch     Trial 1: 7  Palmar/Three-Point Pinch     Trial 1: 2    Right Wrist/Hand      (2nd hand position)     Trial 1: 0    Thumb Strength   Key/Lateral Pinch     Trial 1: 1  Palmar/Three-Point Pinch     Trial 1: 0    Tests     Left Wrist/Hand   Positive extrinsic extensor tightness and intrinsic muscle tightness       Swelling     Left Wrist/Hand   Circumference MCP: 18 6 cm  Circumference wrist: 15 5 cm    Right Wrist/Hand   Circumference MCP: 19 cm  Circumference wrist: 16 2 cm    General Comments:      Shoulder Comments   Shoulder in a sling              Precautions: hx of cancer; kidney removed       Manuals 12/6            graston wrist/hand 6m            MOB wrist /CMC 4m            Retrograde  4m                         HEP  4m            Behavior MOD discussed            CMC orthosis 5m                                                                             Ther Ex                                                                                                                     Ther Activity                                       Gait Training                                       Modalities              8m

## 2022-12-13 ENCOUNTER — OFFICE VISIT (OUTPATIENT)
Dept: OCCUPATIONAL THERAPY | Facility: CLINIC | Age: 87
End: 2022-12-13

## 2022-12-13 DIAGNOSIS — M18.11 ARTHRITIS OF CARPOMETACARPAL (CMC) JOINT OF RIGHT THUMB: Primary | ICD-10-CM

## 2022-12-13 DIAGNOSIS — M19.031 PRIMARY OSTEOARTHRITIS OF RIGHT WRIST: ICD-10-CM

## 2022-12-13 NOTE — PROGRESS NOTES
Daily Note     Today's date: 2022  Patient name: Lillian Ramos  : 3/12/1928  MRN: [id number]  Referring provider: Oliverio Garner MD  Dx:   Encounter Diagnosis     ICD-10-CM    1  Arthritis of carpometacarpal (CMC) joint of right thumb  M18 11       2  Primary osteoarthritis of right wrist  M19 031                      Subjective: I am using the compression glove         Objective: See treatment diary below      Assessment: Tolerated treatment fair  Patient has significant edema  Plan: Continue per plan of care        Precautions: hx of cancer; kidney removed       Manuals            graston wrist/hand 6m 6m           MOB wrist /CMC 4m 4m           Retrograde  4m 4m                        HEP  4m            Behavior MOD discussed            CMC orthosis 5m                                                                             Ther Ex             TGE  2x10           Blocking PIP  2x10           A/PROM thumb   4m           A/PROM   Wrist   4m                                                               Ther Activity                                       Gait Training                                       Modalities             MH 8m 8m

## 2022-12-22 ENCOUNTER — OFFICE VISIT (OUTPATIENT)
Dept: OBGYN CLINIC | Facility: CLINIC | Age: 87
End: 2022-12-22

## 2022-12-22 ENCOUNTER — APPOINTMENT (OUTPATIENT)
Dept: RADIOLOGY | Facility: CLINIC | Age: 87
End: 2022-12-22

## 2022-12-22 VITALS
SYSTOLIC BLOOD PRESSURE: 124 MMHG | BODY MASS INDEX: 24.35 KG/M2 | DIASTOLIC BLOOD PRESSURE: 80 MMHG | WEIGHT: 124 LBS | HEIGHT: 60 IN

## 2022-12-22 DIAGNOSIS — M18.11 ARTHRITIS OF CARPOMETACARPAL (CMC) JOINT OF RIGHT THUMB: ICD-10-CM

## 2022-12-22 DIAGNOSIS — M19.031 PRIMARY OSTEOARTHRITIS OF RIGHT WRIST: ICD-10-CM

## 2022-12-22 DIAGNOSIS — S42.201D CLOSED FRACTURE OF PROXIMAL END OF RIGHT HUMERUS WITH ROUTINE HEALING, UNSPECIFIED FRACTURE MORPHOLOGY, SUBSEQUENT ENCOUNTER: Primary | ICD-10-CM

## 2022-12-22 DIAGNOSIS — S42.201D CLOSED FRACTURE OF PROXIMAL END OF RIGHT HUMERUS WITH ROUTINE HEALING, UNSPECIFIED FRACTURE MORPHOLOGY, SUBSEQUENT ENCOUNTER: ICD-10-CM

## 2022-12-22 RX ORDER — LOSARTAN POTASSIUM 100 MG/1
100 TABLET ORAL DAILY
COMMUNITY
Start: 2022-11-10

## 2022-12-22 RX ORDER — OMEPRAZOLE 20 MG/1
20 CAPSULE, DELAYED RELEASE ORAL DAILY
COMMUNITY
Start: 2022-11-16

## 2022-12-22 NOTE — PROGRESS NOTES
Assessment:     1  Closed fracture of proximal end of right humerus with routine healing, unspecified fracture morphology, subsequent encounter    2  Arthritis of carpometacarpal (CMC) joint of right thumb    3  Primary osteoarthritis of right wrist        Plan:     Problem List Items Addressed This Visit        Musculoskeletal and Integument    Closed fracture of right proximal humerus - Primary    Relevant Orders    XR shoulder 2+ vw right    Arthritis of carpometacarpal (CMC) joint of right thumb    Primary osteoarthritis of right wrist       Patient with right proximal humerus fracture, DOI 11/7/22 and right wrist arthritis  Patient is better  X-ray of right shoulder shows evedince of callus formation over fracture site  She is to discontinue wearing protective sling  Physical therapy order was given out for the range of motion and elbow range of motion exercises  Continue using Voltaren gel as needed for pain relief  Will repeat x-rays at the next office visit of the right shoulder  She is to follow up in 6-8 weeks for re evaluation  All patient's questions were answered to their satisfaction  This note is created using dictation transcription  It may contain typographical errors, grammatical errors, improperly dictated words, background noise and other errors  Subjective:     Patient ID: Winda Eisenmenger is a 80 y o  female  Chief Complaint:  80year old female presents today follow up for  right proximal humerus fracture, DOI 11/7/22, right wrist, and right thumb CMC osteoarthritis  She states she is doing better since the last office visit  She is wearing protective sling at all times  She did go to physical therapy twice for the right wrist and has been doing the exercises at home  She also as been using Voltaren gel on the right thumb and wrist region with relief       Allergy:  No Known Allergies  Medications:  all current active meds have been reviewed  Past Medical History:  Past Medical History: Diagnosis Date   • Cancer [facility])     Cervical, Kidney, melanoma Per MRI Screening form 2/5/19     Past Surgical History:  History reviewed  No pertinent surgical history  Family History:  History reviewed  No pertinent family history  Social History:  Social History     Substance and Sexual Activity   Alcohol Use None     Social History     Substance and Sexual Activity   Drug Use Not Currently     Social History     Tobacco Use   Smoking Status Never   Smokeless Tobacco Never     Review of Systems   Constitutional: Negative for chills and fever  HENT: Negative for ear pain and sore throat  Eyes: Negative for pain and visual disturbance  Respiratory: Negative for cough and shortness of breath  Cardiovascular: Negative for chest pain and palpitations  Gastrointestinal: Negative for abdominal pain and vomiting  Genitourinary: Negative for dysuria and hematuria  Musculoskeletal: Positive for arthralgias (Right wrist) and joint swelling (Right wrist)  Negative for gait problem  Skin: Negative for color change and rash  Neurological: Negative for seizures and syncope  Psychiatric/Behavioral: Negative  All other systems reviewed and are negative  Objective:  BP Readings from Last 1 Encounters:   12/22/22 124/80      Wt Readings from Last 1 Encounters:   12/22/22 56 2 kg (124 lb)      BMI:   Estimated body mass index is 24 22 kg/m² as calculated from the following:    Height as of this encounter: 5' (1 524 m)  Weight as of this encounter: 56 2 kg (124 lb)  BSA:   Estimated body surface area is 1 52 meters squared as calculated from the following:    Height as of this encounter: 5' (1 524 m)  Weight as of this encounter: 56 2 kg (124 lb)  Physical Exam  Vitals and nursing note reviewed  Constitutional:       Appearance: Normal appearance  She is well-developed  HENT:      Head: Normocephalic and atraumatic        Right Ear: External ear normal       Left Ear: External ear normal    Eyes:      Extraocular Movements: Extraocular movements intact  Conjunctiva/sclera: Conjunctivae normal    Pulmonary:      Effort: Pulmonary effort is normal    Musculoskeletal:      Cervical back: Neck supple  Skin:     General: Skin is warm and dry  Neurological:      Mental Status: She is alert and oriented to person, place, and time  Deep Tendon Reflexes: Reflexes are normal and symmetric  Psychiatric:         Mood and Affect: Mood normal          Behavior: Behavior normal        Right Hand Exam     Tenderness   The patient is experiencing tenderness in the dorsal area (Carpal joint)  Range of Motion   The patient has normal right wrist ROM  Other   Erythema: absent  Sensation: normal  Pulse: present    Comments:  Mild swelling over the wrist  No tenderness over the [facility] joint of the thumb      Right Shoulder Exam     Tenderness   The patient is experiencing no tenderness  Range of Motion   Active abduction: abnormal   Passive abduction: abnormal   Forward flexion: abnormal   Internal rotation 0 degrees: abnormal     Muscle Strength   Abduction: 3/5   Internal rotation: 4/5   External rotation: 3/5     Tests   Drop arm: negative    Other   Erythema: absent  Scars: absent  Sensation: normal  Pulse: present    Comments:  No pain with passive abduction             I have personally reviewed pertinent films in PACS and my interpretation is x-ray right shoulder demonstrates proximal  humerus fracture, evidence of healing over fracture site, fracture alignment maintained       Scribe Attestation    I,:  Gela Soni am acting as a scribe while in the presence of the attending physician :       I,:  Maria Luisa Barron MD personally performed the services described in this documentation    as scribed in my presence :

## 2022-12-29 ENCOUNTER — EVALUATION (OUTPATIENT)
Dept: PHYSICAL THERAPY | Facility: CLINIC | Age: 87
End: 2022-12-29

## 2022-12-29 DIAGNOSIS — S42.201D CLOSED FRACTURE OF PROXIMAL END OF RIGHT HUMERUS WITH ROUTINE HEALING, UNSPECIFIED FRACTURE MORPHOLOGY, SUBSEQUENT ENCOUNTER: ICD-10-CM

## 2022-12-29 NOTE — PROGRESS NOTES
PT Evaluation     Today's date: 2022  Patient name: Glena Crigler  : 3/12/1928  MRN: [id number]  Referring provider: Malinda Painter MD  Dx:   Encounter Diagnosis     ICD-10-CM    1  Closed fracture of proximal end of right humerus with routine healing, unspecified fracture morphology, subsequent encounter  S42 201D Ambulatory Referral to Physical Therapy                     Assessment  Assessment details: Pt is a 80 y o  female presenting to PT s/p R proximal humerus fx treated conservatively  PT findings include: R shoulder ROM restrictions, pain, likely strength deficits expected s/p R humerus fracture  Pt educated on findings, anatomy, biomechanics, POC and rehab course  Pt educated that focus initially will be placed on ROM and strengthening upon acceptable ROM  Pt with verbal agreement and understanding  Pt will benefit from skilled PT to address above impairments to return to PLOF with less restriction and to reach functional goals  Impairments: abnormal or restricted ROM, impaired physical strength, lacks appropriate home exercise program, pain with function and poor posture     Symptom irritability: lowUnderstanding of Dx/Px/POC: good   Prognosis: good    Goals  1  Pt will demonstrate understanding of HEP and POC in order to improve compliance with course of rehab in 2 weeks  2  Pt will demonstrate awareness of appropriate posture with seated, standing and functional dynamic reaching activities in order to decrease excessive loads/pain with ADL's in 3 weeks  3  Pt will demonstrate improved shoulder ROM to at least 120° of flexion to allow pt to functionally reach overhead shelves by d/c    4  Pt's pain will decrease to less than 2/10 to allow pt to return to PLOF by d/c       Plan  Patient would benefit from: skilled physical therapy  Planned therapy interventions: manual therapy, joint mobilization, neuromuscular re-education, patient education, postural training, strengthening, stretching, therapeutic activities, therapeutic exercise, home exercise program, flexibility and functional ROM exercises  Frequency: 2x week  Duration in weeks: 8  Treatment plan discussed with: patient        Subjective Evaluation    History of Present Illness  Mechanism of injury: Pt states that she tripped over her laundry basket 22  She visited ER with finding of fracture in the shoulder  She has been following with orthopedic surgery, treated conservatively  She was in sling since that period but recently discharged  She states that she has been taking it lightly with the R shoulder  She has seen OT for wrist pain but performing exercises on her own  Pt is following up with Dr Kirk Lewis 11  Quality of life: good    Pain  Current pain ratin  At best pain ratin  At worst pain ratin  Location: R shoulder  Quality: throbbing, tight and dull ache  Relieving factors: relaxation, rest, heat and support    Patient Goals  Patient goals for therapy: decreased edema, decreased pain, increased motion and increased strength          Objective    Shoulder ROM:  Shoulder flex: 90° (empty end feel)  Shoulder ABD: 50°(empty end feel)  Shoulder ER 0° ABD: 45°    Strength:   Deferred secondary to s/p fracture    Posture:   Kyphosis thoracic       Precautions: None      Manuals             Shoulder PROM DL                                                   Neuro Re-Ed                                                                                                        Ther Ex             Table slides Flex/abd 6x30" ea   HEP            Scapular retraction HEP            Pulley             Supine AAROM shoulder  Flex/ER with cane                                                                Ther Activity                                       Gait Training                                       Modalities

## 2023-01-01 ENCOUNTER — APPOINTMENT (INPATIENT)
Dept: RADIOLOGY | Facility: HOSPITAL | Age: 88
DRG: 853 | End: 2023-01-01
Payer: COMMERCIAL

## 2023-01-01 ENCOUNTER — HOSPICE ADMISSION (OUTPATIENT)
Dept: HOME HOSPICE | Facility: HOSPICE | Age: 88
End: 2023-01-01
Payer: MEDICARE

## 2023-01-01 ENCOUNTER — HOME CARE VISIT (OUTPATIENT)
Dept: HOME HEALTH SERVICES | Facility: HOME HEALTHCARE | Age: 88
End: 2023-01-01
Payer: MEDICARE

## 2023-01-01 ENCOUNTER — ANESTHESIA (INPATIENT)
Dept: PERIOP | Facility: HOSPITAL | Age: 88
DRG: 853 | End: 2023-01-01
Payer: COMMERCIAL

## 2023-01-01 ENCOUNTER — APPOINTMENT (EMERGENCY)
Dept: RADIOLOGY | Facility: HOSPITAL | Age: 88
DRG: 853 | End: 2023-01-01
Payer: COMMERCIAL

## 2023-01-01 ENCOUNTER — APPOINTMENT (INPATIENT)
Dept: NON INVASIVE DIAGNOSTICS | Facility: HOSPITAL | Age: 88
DRG: 853 | End: 2023-01-01
Payer: COMMERCIAL

## 2023-01-01 ENCOUNTER — HOSPITAL ENCOUNTER (INPATIENT)
Facility: HOSPITAL | Age: 88
LOS: 12 days | DRG: 853 | End: 2023-07-23
Attending: EMERGENCY MEDICINE | Admitting: SURGERY
Payer: COMMERCIAL

## 2023-01-01 ENCOUNTER — APPOINTMENT (OUTPATIENT)
Dept: RADIOLOGY | Facility: HOSPITAL | Age: 88
DRG: 853 | End: 2023-01-01
Payer: COMMERCIAL

## 2023-01-01 ENCOUNTER — ANESTHESIA EVENT (INPATIENT)
Dept: PERIOP | Facility: HOSPITAL | Age: 88
DRG: 853 | End: 2023-01-01
Payer: COMMERCIAL

## 2023-01-01 VITALS
DIASTOLIC BLOOD PRESSURE: 56 MMHG | WEIGHT: 121.6 LBS | TEMPERATURE: 97.8 F | RESPIRATION RATE: 25 BRPM | SYSTOLIC BLOOD PRESSURE: 111 MMHG | HEIGHT: 60 IN | BODY MASS INDEX: 23.87 KG/M2 | OXYGEN SATURATION: 96 % | HEART RATE: 94 BPM
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
## 2023-01-01 DIAGNOSIS — I35.0 NONRHEUMATIC AORTIC VALVE STENOSIS: ICD-10-CM

## 2023-01-01 DIAGNOSIS — S32.10XA SACRAL FRACTURE (HCC): ICD-10-CM

## 2023-01-01 DIAGNOSIS — M79.89 NECROTIZING SOFT TISSUE INFECTION: Primary | ICD-10-CM

## 2023-01-01 DIAGNOSIS — K80.00 CALCULUS OF GALLBLADDER WITH ACUTE CHOLECYSTITIS WITHOUT OBSTRUCTION: ICD-10-CM

## 2023-01-01 DIAGNOSIS — N39.0 UTI (URINARY TRACT INFECTION): ICD-10-CM

## 2023-01-01 DIAGNOSIS — R65.10 SIRS (SYSTEMIC INFLAMMATORY RESPONSE SYNDROME) (HCC): ICD-10-CM

## 2023-01-01 DIAGNOSIS — M72.6 NECROTIZING FASCIITIS (HCC): ICD-10-CM

## 2023-01-01 DIAGNOSIS — S32.120A CLOSED NONDISPLACED ZONE II FRACTURE OF SACRUM, INITIAL ENCOUNTER (HCC): ICD-10-CM

## 2023-01-01 DIAGNOSIS — R77.8 ELEVATED TROPONIN: ICD-10-CM

## 2023-01-01 DIAGNOSIS — E87.1 HYPONATREMIA: ICD-10-CM

## 2023-01-01 LAB
2HR DELTA HS TROPONIN: -13 NG/L
2HR DELTA HS TROPONIN: 22 NG/L
4HR DELTA HS TROPONIN: 107 NG/L
ABO GROUP BLD BPU: NORMAL
ABO GROUP BLD BPU: NORMAL
ABO GROUP BLD: NORMAL
ALBUMIN SERPL BCP-MCNC: 1.6 G/DL (ref 3.5–5)
ALBUMIN SERPL BCP-MCNC: 1.6 G/DL (ref 3.5–5)
ALBUMIN SERPL BCP-MCNC: 1.7 G/DL (ref 3.5–5)
ALBUMIN SERPL BCP-MCNC: 1.8 G/DL (ref 3.5–5)
ALBUMIN SERPL BCP-MCNC: 1.9 G/DL (ref 3.5–5)
ALBUMIN SERPL BCP-MCNC: 2.1 G/DL (ref 3.5–5)
ALP SERPL-CCNC: 129 U/L (ref 46–116)
ALP SERPL-CCNC: 145 U/L (ref 46–116)
ALP SERPL-CCNC: 226 U/L (ref 46–116)
ALP SERPL-CCNC: 278 U/L (ref 46–116)
ALP SERPL-CCNC: 352 U/L (ref 46–116)
ALT SERPL W P-5'-P-CCNC: 24 U/L (ref 12–78)
ALT SERPL W P-5'-P-CCNC: 33 U/L (ref 12–78)
ALT SERPL W P-5'-P-CCNC: 36 U/L (ref 12–78)
ALT SERPL W P-5'-P-CCNC: 38 U/L (ref 12–78)
ALT SERPL W P-5'-P-CCNC: 50 U/L (ref 12–78)
ANION GAP SERPL CALCULATED.3IONS-SCNC: 10 MMOL/L
ANION GAP SERPL CALCULATED.3IONS-SCNC: 4 MMOL/L
ANION GAP SERPL CALCULATED.3IONS-SCNC: 5 MMOL/L
ANION GAP SERPL CALCULATED.3IONS-SCNC: 6 MMOL/L
ANION GAP SERPL CALCULATED.3IONS-SCNC: 7 MMOL/L
ANION GAP SERPL CALCULATED.3IONS-SCNC: 8 MMOL/L
ANION GAP SERPL CALCULATED.3IONS-SCNC: 9 MMOL/L
ANISOCYTOSIS BLD QL SMEAR: PRESENT
AORTIC ROOT: 2.7 CM
AORTIC VALVE MEAN VELOCITY: 28.6 M/S
APICAL FOUR CHAMBER EJECTION FRACTION: 40 %
ASCENDING AORTA: 2.9 CM
AST SERPL W P-5'-P-CCNC: 27 U/L (ref 5–45)
AST SERPL W P-5'-P-CCNC: 42 U/L (ref 5–45)
AST SERPL W P-5'-P-CCNC: 49 U/L (ref 5–45)
AST SERPL W P-5'-P-CCNC: 50 U/L (ref 5–45)
AST SERPL W P-5'-P-CCNC: 80 U/L (ref 5–45)
ATRIAL RATE: 109 BPM
ATRIAL RATE: 58 BPM
ATRIAL RATE: 62 BPM
ATRIAL RATE: 64 BPM
ATRIAL RATE: 77 BPM
ATRIAL RATE: 93 BPM
AV AREA BY CONTINUOUS VTI: 0.5 CM2
AV AREA PEAK VELOCITY: 0.5 CM2
AV LVOT MEAN GRADIENT: 1 MMHG
AV LVOT PEAK GRADIENT: 1 MMHG
AV MEAN GRADIENT: 35 MMHG
AV PEAK GRADIENT: 55 MMHG
AV VALVE AREA: 0.46 CM2
AV VELOCITY RATIO: 0.15
BACTERIA BLD CULT: NORMAL
BACTERIA SPEC ANAEROBE CULT: NORMAL
BACTERIA TISS AEROBE CULT: NORMAL
BACTERIA UR CULT: ABNORMAL
BACTERIA UR CULT: NORMAL
BACTERIA UR QL AUTO: ABNORMAL /HPF
BACTERIA UR QL AUTO: ABNORMAL /HPF
BASE EX.OXY STD BLDV CALC-SCNC: 45.8 % (ref 60–80)
BASE EX.OXY STD BLDV CALC-SCNC: 66.2 % (ref 60–80)
BASE EX.OXY STD BLDV CALC-SCNC: 77.1 % (ref 60–80)
BASE EX.OXY STD BLDV CALC-SCNC: 91.3 % (ref 60–80)
BASE EXCESS BLDA CALC-SCNC: -6.7 MMOL/L
BASE EXCESS BLDA CALC-SCNC: -7.5 MMOL/L
BASE EXCESS BLDA CALC-SCNC: -9.2 MMOL/L
BASE EXCESS BLDV CALC-SCNC: -2.3 MMOL/L
BASE EXCESS BLDV CALC-SCNC: -7.2 MMOL/L
BASE EXCESS BLDV CALC-SCNC: -7.5 MMOL/L
BASE EXCESS BLDV CALC-SCNC: -8 MMOL/L
BASOPHILS # BLD AUTO: 0.02 THOUSANDS/ÂΜL (ref 0–0.1)
BASOPHILS # BLD AUTO: 0.02 THOUSANDS/ÂΜL (ref 0–0.1)
BASOPHILS # BLD AUTO: 0.03 THOUSANDS/ÂΜL (ref 0–0.1)
BASOPHILS # BLD MANUAL: 0 THOUSAND/UL (ref 0–0.1)
BASOPHILS NFR BLD AUTO: 0 % (ref 0–1)
BASOPHILS NFR MAR MANUAL: 0 % (ref 0–1)
BILIRUB DIRECT SERPL-MCNC: 0.17 MG/DL (ref 0–0.2)
BILIRUB DIRECT SERPL-MCNC: 0.2 MG/DL (ref 0–0.2)
BILIRUB SERPL-MCNC: 0.28 MG/DL (ref 0.2–1)
BILIRUB SERPL-MCNC: 0.37 MG/DL (ref 0.2–1)
BILIRUB SERPL-MCNC: 0.41 MG/DL (ref 0.2–1)
BILIRUB SERPL-MCNC: 0.43 MG/DL (ref 0.2–1)
BILIRUB SERPL-MCNC: 0.51 MG/DL (ref 0.2–1)
BILIRUB UR QL STRIP: NEGATIVE
BILIRUB UR QL STRIP: NEGATIVE
BLD GP AB SCN SERPL QL: NEGATIVE
BLD GP AB SCN SERPL QL: NEGATIVE
BNP SERPL-MCNC: >4700 PG/ML (ref 0–100)
BPU ID: NORMAL
BPU ID: NORMAL
BUN SERPL-MCNC: 16 MG/DL (ref 5–25)
BUN SERPL-MCNC: 16 MG/DL (ref 5–25)
BUN SERPL-MCNC: 18 MG/DL (ref 5–25)
BUN SERPL-MCNC: 18 MG/DL (ref 5–25)
BUN SERPL-MCNC: 19 MG/DL (ref 5–25)
BUN SERPL-MCNC: 19 MG/DL (ref 5–25)
BUN SERPL-MCNC: 20 MG/DL (ref 5–25)
BUN SERPL-MCNC: 20 MG/DL (ref 5–25)
BUN SERPL-MCNC: 21 MG/DL (ref 5–25)
BUN SERPL-MCNC: 23 MG/DL (ref 5–25)
BUN SERPL-MCNC: 25 MG/DL (ref 5–25)
BUN SERPL-MCNC: 26 MG/DL (ref 5–25)
BUN SERPL-MCNC: 30 MG/DL (ref 5–25)
BUN SERPL-MCNC: 34 MG/DL (ref 5–25)
BUN SERPL-MCNC: 35 MG/DL (ref 5–25)
BUN SERPL-MCNC: 37 MG/DL (ref 5–25)
BUN SERPL-MCNC: 38 MG/DL (ref 5–25)
BUN SERPL-MCNC: 45 MG/DL (ref 5–25)
BUN SERPL-MCNC: 45 MG/DL (ref 5–25)
BUN SERPL-MCNC: 49 MG/DL (ref 5–25)
BUN SERPL-MCNC: 50 MG/DL (ref 5–25)
BUN SERPL-MCNC: 51 MG/DL (ref 5–25)
BUN SERPL-MCNC: 52 MG/DL (ref 5–25)
BUN SERPL-MCNC: 53 MG/DL (ref 5–25)
BUN SERPL-MCNC: 55 MG/DL (ref 5–25)
BURR CELLS BLD QL SMEAR: PRESENT
CA-I BLD-SCNC: 1.06 MMOL/L (ref 1.12–1.32)
CA-I BLD-SCNC: 1.07 MMOL/L (ref 1.12–1.32)
CA-I BLD-SCNC: 1.08 MMOL/L (ref 1.12–1.32)
CA-I BLD-SCNC: 1.08 MMOL/L (ref 1.12–1.32)
CA-I BLD-SCNC: 1.1 MMOL/L (ref 1.12–1.32)
CA-I BLD-SCNC: 1.11 MMOL/L (ref 1.12–1.32)
CA-I BLD-SCNC: 1.17 MMOL/L (ref 1.12–1.32)
CALCIUM ALBUM COR SERPL-MCNC: 10.3 MG/DL (ref 8.3–10.1)
CALCIUM ALBUM COR SERPL-MCNC: 10.4 MG/DL (ref 8.3–10.1)
CALCIUM ALBUM COR SERPL-MCNC: 10.7 MG/DL (ref 8.3–10.1)
CALCIUM ALBUM COR SERPL-MCNC: 9.9 MG/DL (ref 8.3–10.1)
CALCIUM SERPL-MCNC: 8 MG/DL (ref 8.3–10.1)
CALCIUM SERPL-MCNC: 8 MG/DL (ref 8.3–10.1)
CALCIUM SERPL-MCNC: 8.1 MG/DL (ref 8.3–10.1)
CALCIUM SERPL-MCNC: 8.2 MG/DL (ref 8.3–10.1)
CALCIUM SERPL-MCNC: 8.2 MG/DL (ref 8.3–10.1)
CALCIUM SERPL-MCNC: 8.3 MG/DL (ref 8.3–10.1)
CALCIUM SERPL-MCNC: 8.4 MG/DL (ref 8.3–10.1)
CALCIUM SERPL-MCNC: 8.5 MG/DL (ref 8.3–10.1)
CALCIUM SERPL-MCNC: 8.5 MG/DL (ref 8.3–10.1)
CALCIUM SERPL-MCNC: 8.6 MG/DL (ref 8.3–10.1)
CALCIUM SERPL-MCNC: 8.7 MG/DL (ref 8.3–10.1)
CALCIUM SERPL-MCNC: 8.7 MG/DL (ref 8.3–10.1)
CALCIUM SERPL-MCNC: 8.8 MG/DL (ref 8.3–10.1)
CALCIUM SERPL-MCNC: 9 MG/DL (ref 8.3–10.1)
CALCIUM SERPL-MCNC: 9 MG/DL (ref 8.3–10.1)
CARDIAC TROPONIN I PNL SERPL HS: 191 NG/L
CARDIAC TROPONIN I PNL SERPL HS: 193 NG/L
CARDIAC TROPONIN I PNL SERPL HS: 204 NG/L
CARDIAC TROPONIN I PNL SERPL HS: 215 NG/L
CARDIAC TROPONIN I PNL SERPL HS: 300 NG/L
CHLORIDE SERPL-SCNC: 100 MMOL/L (ref 96–108)
CHLORIDE SERPL-SCNC: 100 MMOL/L (ref 96–108)
CHLORIDE SERPL-SCNC: 101 MMOL/L (ref 96–108)
CHLORIDE SERPL-SCNC: 102 MMOL/L (ref 96–108)
CHLORIDE SERPL-SCNC: 104 MMOL/L (ref 96–108)
CHLORIDE SERPL-SCNC: 104 MMOL/L (ref 96–108)
CHLORIDE SERPL-SCNC: 105 MMOL/L (ref 96–108)
CHLORIDE SERPL-SCNC: 106 MMOL/L (ref 96–108)
CHLORIDE SERPL-SCNC: 106 MMOL/L (ref 96–108)
CHLORIDE SERPL-SCNC: 109 MMOL/L (ref 96–108)
CHLORIDE SERPL-SCNC: 110 MMOL/L (ref 96–108)
CHLORIDE SERPL-SCNC: 111 MMOL/L (ref 96–108)
CHLORIDE SERPL-SCNC: 113 MMOL/L (ref 96–108)
CHLORIDE SERPL-SCNC: 113 MMOL/L (ref 96–108)
CHLORIDE SERPL-SCNC: 114 MMOL/L (ref 96–108)
CHLORIDE SERPL-SCNC: 115 MMOL/L (ref 96–108)
CHLORIDE SERPL-SCNC: 92 MMOL/L (ref 96–108)
CHLORIDE SERPL-SCNC: 93 MMOL/L (ref 96–108)
CHLORIDE SERPL-SCNC: 94 MMOL/L (ref 96–108)
CHLORIDE SERPL-SCNC: 97 MMOL/L (ref 96–108)
CHLORIDE SERPL-SCNC: 98 MMOL/L (ref 96–108)
CHLORIDE SERPL-SCNC: 99 MMOL/L (ref 96–108)
CHLORIDE SERPL-SCNC: 99 MMOL/L (ref 96–108)
CLARITY UR: ABNORMAL
CLARITY UR: CLEAR
CO2 SERPL-SCNC: 17 MMOL/L (ref 21–32)
CO2 SERPL-SCNC: 17 MMOL/L (ref 21–32)
CO2 SERPL-SCNC: 18 MMOL/L (ref 21–32)
CO2 SERPL-SCNC: 19 MMOL/L (ref 21–32)
CO2 SERPL-SCNC: 20 MMOL/L (ref 21–32)
CO2 SERPL-SCNC: 21 MMOL/L (ref 21–32)
CO2 SERPL-SCNC: 22 MMOL/L (ref 21–32)
CO2 SERPL-SCNC: 23 MMOL/L (ref 21–32)
COLOR UR: YELLOW
COLOR UR: YELLOW
CORTIS SERPL-MCNC: 27.7 UG/DL
CREAT SERPL-MCNC: 0.84 MG/DL (ref 0.6–1.3)
CREAT SERPL-MCNC: 0.87 MG/DL (ref 0.6–1.3)
CREAT SERPL-MCNC: 0.88 MG/DL (ref 0.6–1.3)
CREAT SERPL-MCNC: 0.92 MG/DL (ref 0.6–1.3)
CREAT SERPL-MCNC: 0.93 MG/DL (ref 0.6–1.3)
CREAT SERPL-MCNC: 0.94 MG/DL (ref 0.6–1.3)
CREAT SERPL-MCNC: 0.95 MG/DL (ref 0.6–1.3)
CREAT SERPL-MCNC: 1 MG/DL (ref 0.6–1.3)
CREAT SERPL-MCNC: 1.02 MG/DL (ref 0.6–1.3)
CREAT SERPL-MCNC: 1.03 MG/DL (ref 0.6–1.3)
CREAT SERPL-MCNC: 1.05 MG/DL (ref 0.6–1.3)
CREAT SERPL-MCNC: 1.05 MG/DL (ref 0.6–1.3)
CREAT SERPL-MCNC: 1.12 MG/DL (ref 0.6–1.3)
CREAT SERPL-MCNC: 1.12 MG/DL (ref 0.6–1.3)
CREAT SERPL-MCNC: 1.15 MG/DL (ref 0.6–1.3)
CREAT SERPL-MCNC: 1.17 MG/DL (ref 0.6–1.3)
CREAT SERPL-MCNC: 1.2 MG/DL (ref 0.6–1.3)
CREAT SERPL-MCNC: 1.21 MG/DL (ref 0.6–1.3)
CREAT SERPL-MCNC: 1.23 MG/DL (ref 0.6–1.3)
CREAT SERPL-MCNC: 1.29 MG/DL (ref 0.6–1.3)
CREAT SERPL-MCNC: 1.33 MG/DL (ref 0.6–1.3)
CREAT SERPL-MCNC: 1.68 MG/DL (ref 0.6–1.3)
CREAT SERPL-MCNC: 1.82 MG/DL (ref 0.6–1.3)
CROSSMATCH: NORMAL
CROSSMATCH: NORMAL
DIFFERENTIAL COMMENT: ABNORMAL
DOP CALC AO PEAK VEL: 3.72 M/S
DOP CALC AO VTI: 96.98 CM
DOP CALC LVOT AREA: 3.14 CM2
DOP CALC LVOT CARDIAC INDEX: 1.92 L/MIN/M2
DOP CALC LVOT CARDIAC OUTPUT: 2.88 L/MIN
DOP CALC LVOT DIAMETER: 2 CM
DOP CALC LVOT PEAK VEL VTI: 14.25 CM
DOP CALC LVOT PEAK VEL: 0.57 M/S
DOP CALC LVOT STROKE INDEX: 28.7 ML/M2
DOP CALC LVOT STROKE VOLUME: 44.75
DOP CALC MV VTI: 48.42 CM
E WAVE DECELERATION TIME: 306 MS
EOSINOPHIL # BLD AUTO: 0.01 THOUSAND/ÂΜL (ref 0–0.61)
EOSINOPHIL # BLD AUTO: 0.05 THOUSAND/ÂΜL (ref 0–0.61)
EOSINOPHIL # BLD AUTO: 0.11 THOUSAND/ÂΜL (ref 0–0.61)
EOSINOPHIL # BLD MANUAL: 0 THOUSAND/UL (ref 0–0.4)
EOSINOPHIL NFR BLD AUTO: 0 % (ref 0–6)
EOSINOPHIL NFR BLD AUTO: 0 % (ref 0–6)
EOSINOPHIL NFR BLD AUTO: 1 % (ref 0–6)
EOSINOPHIL NFR BLD MANUAL: 0 % (ref 0–6)
ERYTHROCYTE [DISTWIDTH] IN BLOOD BY AUTOMATED COUNT: 13.4 % (ref 11.6–15.1)
ERYTHROCYTE [DISTWIDTH] IN BLOOD BY AUTOMATED COUNT: 13.5 % (ref 11.6–15.1)
ERYTHROCYTE [DISTWIDTH] IN BLOOD BY AUTOMATED COUNT: 13.7 % (ref 11.6–15.1)
ERYTHROCYTE [DISTWIDTH] IN BLOOD BY AUTOMATED COUNT: 13.7 % (ref 11.6–15.1)
ERYTHROCYTE [DISTWIDTH] IN BLOOD BY AUTOMATED COUNT: 13.8 % (ref 11.6–15.1)
ERYTHROCYTE [DISTWIDTH] IN BLOOD BY AUTOMATED COUNT: 14.3 % (ref 11.6–15.1)
ERYTHROCYTE [DISTWIDTH] IN BLOOD BY AUTOMATED COUNT: 14.6 % (ref 11.6–15.1)
ERYTHROCYTE [DISTWIDTH] IN BLOOD BY AUTOMATED COUNT: 14.7 % (ref 11.6–15.1)
ERYTHROCYTE [DISTWIDTH] IN BLOOD BY AUTOMATED COUNT: 14.8 % (ref 11.6–15.1)
ERYTHROCYTE [DISTWIDTH] IN BLOOD BY AUTOMATED COUNT: 14.9 % (ref 11.6–15.1)
ERYTHROCYTE [DISTWIDTH] IN BLOOD BY AUTOMATED COUNT: 15 % (ref 11.6–15.1)
ERYTHROCYTE [DISTWIDTH] IN BLOOD BY AUTOMATED COUNT: 15.1 % (ref 11.6–15.1)
ERYTHROCYTE [DISTWIDTH] IN BLOOD BY AUTOMATED COUNT: 15.2 % (ref 11.6–15.1)
FRACTIONAL SHORTENING: 10 (ref 28–44)
FUNGUS SPEC CULT: NORMAL
GFR SERPL CREATININE-BSD FRML MDRD: 23 ML/MIN/1.73SQ M
GFR SERPL CREATININE-BSD FRML MDRD: 25 ML/MIN/1.73SQ M
GFR SERPL CREATININE-BSD FRML MDRD: 34 ML/MIN/1.73SQ M
GFR SERPL CREATININE-BSD FRML MDRD: 35 ML/MIN/1.73SQ M
GFR SERPL CREATININE-BSD FRML MDRD: 37 ML/MIN/1.73SQ M
GFR SERPL CREATININE-BSD FRML MDRD: 38 ML/MIN/1.73SQ M
GFR SERPL CREATININE-BSD FRML MDRD: 38 ML/MIN/1.73SQ M
GFR SERPL CREATININE-BSD FRML MDRD: 39 ML/MIN/1.73SQ M
GFR SERPL CREATININE-BSD FRML MDRD: 40 ML/MIN/1.73SQ M
GFR SERPL CREATININE-BSD FRML MDRD: 41 ML/MIN/1.73SQ M
GFR SERPL CREATININE-BSD FRML MDRD: 41 ML/MIN/1.73SQ M
GFR SERPL CREATININE-BSD FRML MDRD: 45 ML/MIN/1.73SQ M
GFR SERPL CREATININE-BSD FRML MDRD: 45 ML/MIN/1.73SQ M
GFR SERPL CREATININE-BSD FRML MDRD: 46 ML/MIN/1.73SQ M
GFR SERPL CREATININE-BSD FRML MDRD: 46 ML/MIN/1.73SQ M
GFR SERPL CREATININE-BSD FRML MDRD: 48 ML/MIN/1.73SQ M
GFR SERPL CREATININE-BSD FRML MDRD: 51 ML/MIN/1.73SQ M
GFR SERPL CREATININE-BSD FRML MDRD: 51 ML/MIN/1.73SQ M
GFR SERPL CREATININE-BSD FRML MDRD: 52 ML/MIN/1.73SQ M
GFR SERPL CREATININE-BSD FRML MDRD: 53 ML/MIN/1.73SQ M
GFR SERPL CREATININE-BSD FRML MDRD: 56 ML/MIN/1.73SQ M
GFR SERPL CREATININE-BSD FRML MDRD: 56 ML/MIN/1.73SQ M
GFR SERPL CREATININE-BSD FRML MDRD: 59 ML/MIN/1.73SQ M
GLUCOSE SERPL-MCNC: 100 MG/DL (ref 65–140)
GLUCOSE SERPL-MCNC: 107 MG/DL (ref 65–140)
GLUCOSE SERPL-MCNC: 114 MG/DL (ref 65–140)
GLUCOSE SERPL-MCNC: 116 MG/DL (ref 65–140)
GLUCOSE SERPL-MCNC: 119 MG/DL (ref 65–140)
GLUCOSE SERPL-MCNC: 126 MG/DL (ref 65–140)
GLUCOSE SERPL-MCNC: 127 MG/DL (ref 65–140)
GLUCOSE SERPL-MCNC: 128 MG/DL (ref 65–140)
GLUCOSE SERPL-MCNC: 128 MG/DL (ref 65–140)
GLUCOSE SERPL-MCNC: 130 MG/DL (ref 65–140)
GLUCOSE SERPL-MCNC: 137 MG/DL (ref 65–140)
GLUCOSE SERPL-MCNC: 137 MG/DL (ref 65–140)
GLUCOSE SERPL-MCNC: 138 MG/DL (ref 65–140)
GLUCOSE SERPL-MCNC: 140 MG/DL (ref 65–140)
GLUCOSE SERPL-MCNC: 154 MG/DL (ref 65–140)
GLUCOSE SERPL-MCNC: 156 MG/DL (ref 65–140)
GLUCOSE SERPL-MCNC: 176 MG/DL (ref 65–140)
GLUCOSE SERPL-MCNC: 178 MG/DL (ref 65–140)
GLUCOSE SERPL-MCNC: 63 MG/DL (ref 65–140)
GLUCOSE SERPL-MCNC: 75 MG/DL (ref 65–140)
GLUCOSE SERPL-MCNC: 77 MG/DL (ref 65–140)
GLUCOSE SERPL-MCNC: 82 MG/DL (ref 65–140)
GLUCOSE SERPL-MCNC: 90 MG/DL (ref 65–140)
GLUCOSE SERPL-MCNC: 92 MG/DL (ref 65–140)
GLUCOSE SERPL-MCNC: 93 MG/DL (ref 65–140)
GLUCOSE SERPL-MCNC: 94 MG/DL (ref 65–140)
GLUCOSE UR STRIP-MCNC: NEGATIVE MG/DL
GLUCOSE UR STRIP-MCNC: NEGATIVE MG/DL
GRAM STN SPEC: NORMAL
GRAM STN SPEC: NORMAL
HCO3 BLDA-SCNC: 16.1 MMOL/L (ref 22–28)
HCO3 BLDA-SCNC: 17.6 MMOL/L (ref 22–28)
HCO3 BLDA-SCNC: 18.3 MMOL/L (ref 22–28)
HCO3 BLDV-SCNC: 17.5 MMOL/L (ref 24–30)
HCO3 BLDV-SCNC: 19.2 MMOL/L (ref 24–30)
HCO3 BLDV-SCNC: 19.5 MMOL/L (ref 24–30)
HCO3 BLDV-SCNC: 19.6 MMOL/L (ref 24–30)
HCT VFR BLD AUTO: 23.5 % (ref 34.8–46.1)
HCT VFR BLD AUTO: 23.8 % (ref 34.8–46.1)
HCT VFR BLD AUTO: 25.3 % (ref 34.8–46.1)
HCT VFR BLD AUTO: 26.1 % (ref 34.8–46.1)
HCT VFR BLD AUTO: 26.2 % (ref 34.8–46.1)
HCT VFR BLD AUTO: 26.4 % (ref 34.8–46.1)
HCT VFR BLD AUTO: 26.5 % (ref 34.8–46.1)
HCT VFR BLD AUTO: 26.6 % (ref 34.8–46.1)
HCT VFR BLD AUTO: 26.7 % (ref 34.8–46.1)
HCT VFR BLD AUTO: 27 % (ref 34.8–46.1)
HCT VFR BLD AUTO: 27.7 % (ref 34.8–46.1)
HCT VFR BLD AUTO: 27.8 % (ref 34.8–46.1)
HCT VFR BLD AUTO: 28.4 % (ref 34.8–46.1)
HCT VFR BLD AUTO: 29.1 % (ref 34.8–46.1)
HCT VFR BLD AUTO: 29.4 % (ref 34.8–46.1)
HCT VFR BLD AUTO: 29.5 % (ref 34.8–46.1)
HCT VFR BLD AUTO: 29.6 % (ref 34.8–46.1)
HCT VFR BLD AUTO: 30 % (ref 34.8–46.1)
HGB BLD-MCNC: 7.8 G/DL (ref 11.5–15.4)
HGB BLD-MCNC: 7.8 G/DL (ref 11.5–15.4)
HGB BLD-MCNC: 8.1 G/DL (ref 11.5–15.4)
HGB BLD-MCNC: 8.1 G/DL (ref 11.5–15.4)
HGB BLD-MCNC: 8.7 G/DL (ref 11.5–15.4)
HGB BLD-MCNC: 8.7 G/DL (ref 11.5–15.4)
HGB BLD-MCNC: 9 G/DL (ref 11.5–15.4)
HGB BLD-MCNC: 9 G/DL (ref 11.5–15.4)
HGB BLD-MCNC: 9.1 G/DL (ref 11.5–15.4)
HGB BLD-MCNC: 9.2 G/DL (ref 11.5–15.4)
HGB BLD-MCNC: 9.4 G/DL (ref 11.5–15.4)
HGB BLD-MCNC: 9.5 G/DL (ref 11.5–15.4)
HGB BLD-MCNC: 9.5 G/DL (ref 11.5–15.4)
HGB BLD-MCNC: 9.7 G/DL (ref 11.5–15.4)
HGB BLD-MCNC: 9.8 G/DL (ref 11.5–15.4)
HGB BLD-MCNC: 9.8 G/DL (ref 11.5–15.4)
HGB UR QL STRIP.AUTO: ABNORMAL
HGB UR QL STRIP.AUTO: ABNORMAL
HOROWITZ INDEX BLDA+IHG-RTO: 40 MM[HG]
HOROWITZ INDEX BLDA+IHG-RTO: 40 MM[HG]
IMM GRANULOCYTES # BLD AUTO: 0.14 THOUSAND/UL (ref 0–0.2)
IMM GRANULOCYTES # BLD AUTO: 0.26 THOUSAND/UL (ref 0–0.2)
IMM GRANULOCYTES # BLD AUTO: 0.27 THOUSAND/UL (ref 0–0.2)
IMM GRANULOCYTES NFR BLD AUTO: 1 % (ref 0–2)
IMM GRANULOCYTES NFR BLD AUTO: 1 % (ref 0–2)
IMM GRANULOCYTES NFR BLD AUTO: 2 % (ref 0–2)
INTERVENTRICULAR SEPTUM IN DIASTOLE (PARASTERNAL SHORT AXIS VIEW): 1.2 CM
INTERVENTRICULAR SEPTUM: 1.2 CM (ref 0.6–1.1)
KETONES UR STRIP-MCNC: NEGATIVE MG/DL
KETONES UR STRIP-MCNC: NEGATIVE MG/DL
LAAS-AP2: 15.7 CM2
LAAS-AP4: 22.7 CM2
LACTATE SERPL-SCNC: 1.8 MMOL/L (ref 0.5–2)
LACTATE SERPL-SCNC: 1.8 MMOL/L (ref 0.5–2)
LACTATE SERPL-SCNC: 2.3 MMOL/L (ref 0.5–2)
LACTATE SERPL-SCNC: 2.3 MMOL/L (ref 0.5–2)
LEFT ATRIUM SIZE: 4.4 CM
LEFT ATRIUM VOLUME (MOD BIPLANE): 53 ML
LEFT INTERNAL DIMENSION IN SYSTOLE: 2.6 CM (ref 2.1–4)
LEFT VENTRICULAR INTERNAL DIMENSION IN DIASTOLE: 2.9 CM (ref 3.5–6)
LEFT VENTRICULAR POSTERIOR WALL IN END DIASTOLE: 1.2 CM
LEFT VENTRICULAR STROKE VOLUME: 8 ML
LEUKOCYTE ESTERASE UR QL STRIP: ABNORMAL
LEUKOCYTE ESTERASE UR QL STRIP: ABNORMAL
LVSV (TEICH): 8 ML
LYMPHOCYTES # BLD AUTO: 0.76 THOUSAND/UL (ref 0.6–4.47)
LYMPHOCYTES # BLD AUTO: 1.74 THOUSANDS/ÂΜL (ref 0.6–4.47)
LYMPHOCYTES # BLD AUTO: 1.77 THOUSANDS/ÂΜL (ref 0.6–4.47)
LYMPHOCYTES # BLD AUTO: 10.66 THOUSAND/UL (ref 0.6–4.47)
LYMPHOCYTES # BLD AUTO: 13 % (ref 14–44)
LYMPHOCYTES # BLD AUTO: 14.45 THOUSAND/UL (ref 0.6–4.47)
LYMPHOCYTES # BLD AUTO: 16 % (ref 14–44)
LYMPHOCYTES # BLD AUTO: 2.23 THOUSANDS/ÂΜL (ref 0.6–4.47)
LYMPHOCYTES # BLD AUTO: 22 % (ref 14–44)
LYMPHOCYTES # BLD AUTO: 3 % (ref 14–44)
LYMPHOCYTES # BLD AUTO: 35 % (ref 14–44)
LYMPHOCYTES # BLD AUTO: 5.01 THOUSAND/UL (ref 0.6–4.47)
LYMPHOCYTES # BLD AUTO: 7.13 THOUSAND/UL (ref 0.6–4.47)
LYMPHOCYTES # BLD AUTO: 8 % (ref 14–44)
LYMPHOCYTES # BLD AUTO: 8.1 THOUSAND/UL (ref 0.6–4.47)
LYMPHOCYTES NFR BLD AUTO: 10 % (ref 14–44)
LYMPHOCYTES NFR BLD AUTO: 12 % (ref 14–44)
LYMPHOCYTES NFR BLD AUTO: 8 % (ref 14–44)
MACROCYTES BLD QL AUTO: PRESENT
MACROCYTES BLD QL AUTO: PRESENT
MAGNESIUM SERPL-MCNC: 1.1 MG/DL (ref 1.6–2.6)
MAGNESIUM SERPL-MCNC: 2 MG/DL (ref 1.6–2.6)
MAGNESIUM SERPL-MCNC: 2.1 MG/DL (ref 1.6–2.6)
MAGNESIUM SERPL-MCNC: 2.2 MG/DL (ref 1.6–2.6)
MAGNESIUM SERPL-MCNC: 2.2 MG/DL (ref 1.6–2.6)
MAGNESIUM SERPL-MCNC: 2.3 MG/DL (ref 1.6–2.6)
MAGNESIUM SERPL-MCNC: 2.3 MG/DL (ref 1.6–2.6)
MAGNESIUM SERPL-MCNC: 2.5 MG/DL (ref 1.6–2.6)
MAGNESIUM SERPL-MCNC: 2.5 MG/DL (ref 1.6–2.6)
MCH RBC QN AUTO: 28.6 PG (ref 26.8–34.3)
MCH RBC QN AUTO: 28.8 PG (ref 26.8–34.3)
MCH RBC QN AUTO: 29 PG (ref 26.8–34.3)
MCH RBC QN AUTO: 29 PG (ref 26.8–34.3)
MCH RBC QN AUTO: 29.1 PG (ref 26.8–34.3)
MCH RBC QN AUTO: 29.1 PG (ref 26.8–34.3)
MCH RBC QN AUTO: 29.2 PG (ref 26.8–34.3)
MCH RBC QN AUTO: 29.4 PG (ref 26.8–34.3)
MCH RBC QN AUTO: 29.5 PG (ref 26.8–34.3)
MCH RBC QN AUTO: 29.5 PG (ref 26.8–34.3)
MCH RBC QN AUTO: 29.6 PG (ref 26.8–34.3)
MCH RBC QN AUTO: 29.7 PG (ref 26.8–34.3)
MCH RBC QN AUTO: 29.7 PG (ref 26.8–34.3)
MCHC RBC AUTO-ENTMCNC: 31 G/DL (ref 31.4–37.4)
MCHC RBC AUTO-ENTMCNC: 31.3 G/DL (ref 31.4–37.4)
MCHC RBC AUTO-ENTMCNC: 32 G/DL (ref 31.4–37.4)
MCHC RBC AUTO-ENTMCNC: 32.6 G/DL (ref 31.4–37.4)
MCHC RBC AUTO-ENTMCNC: 32.7 G/DL (ref 31.4–37.4)
MCHC RBC AUTO-ENTMCNC: 32.8 G/DL (ref 31.4–37.4)
MCHC RBC AUTO-ENTMCNC: 33 G/DL (ref 31.4–37.4)
MCHC RBC AUTO-ENTMCNC: 33.1 G/DL (ref 31.4–37.4)
MCHC RBC AUTO-ENTMCNC: 33.2 G/DL (ref 31.4–37.4)
MCHC RBC AUTO-ENTMCNC: 33.5 G/DL (ref 31.4–37.4)
MCHC RBC AUTO-ENTMCNC: 33.7 G/DL (ref 31.4–37.4)
MCHC RBC AUTO-ENTMCNC: 33.8 G/DL (ref 31.4–37.4)
MCHC RBC AUTO-ENTMCNC: 33.9 G/DL (ref 31.4–37.4)
MCHC RBC AUTO-ENTMCNC: 34 G/DL (ref 31.4–37.4)
MCHC RBC AUTO-ENTMCNC: 34.1 G/DL (ref 31.4–37.4)
MCHC RBC AUTO-ENTMCNC: 34.5 G/DL (ref 31.4–37.4)
MCV RBC AUTO: 85 FL (ref 82–98)
MCV RBC AUTO: 86 FL (ref 82–98)
MCV RBC AUTO: 87 FL (ref 82–98)
MCV RBC AUTO: 88 FL (ref 82–98)
MCV RBC AUTO: 89 FL (ref 82–98)
MCV RBC AUTO: 90 FL (ref 82–98)
MCV RBC AUTO: 93 FL (ref 82–98)
MCV RBC AUTO: 94 FL (ref 82–98)
METAMYELOCYTES NFR BLD MANUAL: 1 % (ref 0–1)
METAMYELOCYTES NFR BLD MANUAL: 1 % (ref 0–1)
METAMYELOCYTES NFR BLD MANUAL: 2 % (ref 0–1)
METAMYELOCYTES NFR BLD MANUAL: 4 % (ref 0–1)
MONOCYTES # BLD AUTO: 0.45 THOUSAND/UL (ref 0–1.22)
MONOCYTES # BLD AUTO: 0.5 THOUSAND/UL (ref 0–1.22)
MONOCYTES # BLD AUTO: 0.61 THOUSAND/ÂΜL (ref 0.17–1.22)
MONOCYTES # BLD AUTO: 0.8 THOUSAND/UL (ref 0–1.22)
MONOCYTES # BLD AUTO: 0.83 THOUSAND/ÂΜL (ref 0.17–1.22)
MONOCYTES # BLD AUTO: 0.99 THOUSAND/ÂΜL (ref 0.17–1.22)
MONOCYTES # BLD AUTO: 1.45 THOUSAND/UL (ref 0–1.22)
MONOCYTES # BLD AUTO: 1.88 THOUSAND/UL (ref 0–1.22)
MONOCYTES # BLD AUTO: 2.53 THOUSAND/UL (ref 0–1.22)
MONOCYTES NFR BLD AUTO: 3 % (ref 4–12)
MONOCYTES NFR BLD AUTO: 4 % (ref 4–12)
MONOCYTES NFR BLD AUTO: 5 % (ref 4–12)
MONOCYTES NFR BLD: 1 % (ref 4–12)
MONOCYTES NFR BLD: 2 % (ref 4–12)
MONOCYTES NFR BLD: 2 % (ref 4–12)
MONOCYTES NFR BLD: 3 % (ref 4–12)
MONOCYTES NFR BLD: 3 % (ref 4–12)
MONOCYTES NFR BLD: 5 % (ref 4–12)
MRSA NOSE QL CULT: NORMAL
MV E'TISSUE VEL-SEP: 4 CM/S
MV MEAN GRADIENT: 3 MMHG
MV PEAK A VEL: 1.55 M/S
MV PEAK E VEL: 138 CM/S
MV PEAK GRADIENT: 11 MMHG
MV STENOSIS PRESSURE HALF TIME: 89 MS
MV VALVE AREA BY CONTINUITY EQUATION: 0.92 CM2
MV VALVE AREA P 1/2 METHOD: 2.47
MYCOBACTERIUM SPEC CULT: NORMAL
MYELOCYTES NFR BLD MANUAL: 1 % (ref 0–1)
MYELOCYTES NFR BLD MANUAL: 2 % (ref 0–1)
NEUTROPHILS # BLD AUTO: 15.45 THOUSANDS/ÂΜL (ref 1.85–7.62)
NEUTROPHILS # BLD AUTO: 15.5 THOUSANDS/ÂΜL (ref 1.85–7.62)
NEUTROPHILS # BLD AUTO: 18.53 THOUSANDS/ÂΜL (ref 1.85–7.62)
NEUTROPHILS # BLD MANUAL: 23.93 THOUSAND/UL (ref 1.85–7.62)
NEUTROPHILS # BLD MANUAL: 24.49 THOUSAND/UL (ref 1.85–7.62)
NEUTROPHILS # BLD MANUAL: 34.4 THOUSAND/UL (ref 1.85–7.62)
NEUTROPHILS # BLD MANUAL: 35.66 THOUSAND/UL (ref 1.85–7.62)
NEUTROPHILS # BLD MANUAL: 37.47 THOUSAND/UL (ref 1.85–7.62)
NEUTROPHILS # BLD MANUAL: 55.71 THOUSAND/UL (ref 1.85–7.62)
NEUTS BAND NFR BLD MANUAL: 2 % (ref 0–8)
NEUTS BAND NFR BLD MANUAL: 3 % (ref 0–8)
NEUTS BAND NFR BLD MANUAL: 3 % (ref 0–8)
NEUTS BAND NFR BLD MANUAL: 4 % (ref 0–8)
NEUTS BAND NFR BLD MANUAL: 9 % (ref 0–8)
NEUTS SEG NFR BLD AUTO: 59 % (ref 43–75)
NEUTS SEG NFR BLD AUTO: 68 % (ref 43–75)
NEUTS SEG NFR BLD AUTO: 74 % (ref 43–75)
NEUTS SEG NFR BLD AUTO: 76 % (ref 43–75)
NEUTS SEG NFR BLD AUTO: 82 % (ref 43–75)
NEUTS SEG NFR BLD AUTO: 85 % (ref 43–75)
NEUTS SEG NFR BLD AUTO: 86 % (ref 43–75)
NITRITE UR QL STRIP: NEGATIVE
NITRITE UR QL STRIP: POSITIVE
NON-SQ EPI CELLS URNS QL MICRO: ABNORMAL /HPF
NON-SQ EPI CELLS URNS QL MICRO: ABNORMAL /HPF
NRBC BLD AUTO-RTO: 0 /100 WBCS
NRBC BLD AUTO-RTO: 2 /100 WBC (ref 0–2)
O2 CT BLDA-SCNC: 13.7 ML/DL (ref 16–23)
O2 CT BLDA-SCNC: 14.8 ML/DL (ref 16–23)
O2 CT BLDA-SCNC: 14.9 ML/DL (ref 16–23)
O2 CT BLDV-SCNC: 10.1 ML/DL
O2 CT BLDV-SCNC: 11.6 ML/DL
O2 CT BLDV-SCNC: 13.2 ML/DL
O2 CT BLDV-SCNC: 5.4 ML/DL
OSMOLALITY UR/SERPL-RTO: 265 MMOL/KG (ref 282–298)
OSMOLALITY UR: 258 MMOL/KG
OXYHGB MFR BLDA: 97.3 % (ref 94–97)
OXYHGB MFR BLDA: 97.5 % (ref 94–97)
OXYHGB MFR BLDA: 97.6 % (ref 94–97)
P AXIS: 32 DEGREES
P AXIS: 52 DEGREES
P AXIS: 59 DEGREES
P AXIS: 63 DEGREES
P AXIS: 65 DEGREES
P AXIS: 66 DEGREES
PCO2 BLDA: 30.8 MM HG (ref 36–44)
PCO2 BLDA: 32.7 MM HG (ref 36–44)
PCO2 BLDA: 37.7 MM HG (ref 36–44)
PCO2 BLDV: 23.3 MM HG (ref 42–50)
PCO2 BLDV: 35.4 MM HG (ref 42–50)
PCO2 BLDV: 45 MM HG (ref 42–50)
PCO2 BLDV: 45.9 MM HG (ref 42–50)
PEEP RESPIRATORY: 6 CM[H2O]
PEEP RESPIRATORY: 6 CM[H2O]
PH BLDA: 7.3 [PH] (ref 7.35–7.45)
PH BLDA: 7.31 [PH] (ref 7.35–7.45)
PH BLDA: 7.37 [PH] (ref 7.35–7.45)
PH BLDV: 7.25 [PH] (ref 7.3–7.4)
PH BLDV: 7.26 [PH] (ref 7.3–7.4)
PH BLDV: 7.31 [PH] (ref 7.3–7.4)
PH BLDV: 7.53 [PH] (ref 7.3–7.4)
PH UR STRIP.AUTO: 5 [PH]
PH UR STRIP.AUTO: 5.5 [PH]
PHOSPHATE SERPL-MCNC: 1.5 MG/DL (ref 2.3–4.1)
PHOSPHATE SERPL-MCNC: 1.8 MG/DL (ref 2.3–4.1)
PHOSPHATE SERPL-MCNC: 2.6 MG/DL (ref 2.3–4.1)
PHOSPHATE SERPL-MCNC: 2.8 MG/DL (ref 2.3–4.1)
PHOSPHATE SERPL-MCNC: 3 MG/DL (ref 2.3–4.1)
PHOSPHATE SERPL-MCNC: 3.6 MG/DL (ref 2.3–4.1)
PHOSPHATE SERPL-MCNC: 3.7 MG/DL (ref 2.3–4.1)
PHOSPHATE SERPL-MCNC: 4.3 MG/DL (ref 2.3–4.1)
PHOSPHATE SERPL-MCNC: 4.6 MG/DL (ref 2.3–4.1)
PHOSPHATE SERPL-MCNC: 4.9 MG/DL (ref 2.3–4.1)
PHOSPHATE SERPL-MCNC: 5 MG/DL (ref 2.3–4.1)
PLASMA CELLS NFR BLD: 1 % (ref 0–0)
PLATELET # BLD AUTO: 148 THOUSANDS/UL (ref 149–390)
PLATELET # BLD AUTO: 150 THOUSANDS/UL (ref 149–390)
PLATELET # BLD AUTO: 169 THOUSANDS/UL (ref 149–390)
PLATELET # BLD AUTO: 190 THOUSANDS/UL (ref 149–390)
PLATELET # BLD AUTO: 224 THOUSANDS/UL (ref 149–390)
PLATELET # BLD AUTO: 239 THOUSANDS/UL (ref 149–390)
PLATELET # BLD AUTO: 259 THOUSANDS/UL (ref 149–390)
PLATELET # BLD AUTO: 275 THOUSANDS/UL (ref 149–390)
PLATELET # BLD AUTO: 278 THOUSANDS/UL (ref 149–390)
PLATELET # BLD AUTO: 301 THOUSANDS/UL (ref 149–390)
PLATELET # BLD AUTO: 313 THOUSANDS/UL (ref 149–390)
PLATELET # BLD AUTO: 322 THOUSANDS/UL (ref 149–390)
PLATELET # BLD AUTO: 357 THOUSANDS/UL (ref 149–390)
PLATELET # BLD AUTO: 379 THOUSANDS/UL (ref 149–390)
PLATELET # BLD AUTO: 391 THOUSANDS/UL (ref 149–390)
PLATELET # BLD AUTO: 407 THOUSANDS/UL (ref 149–390)
PLATELET # BLD AUTO: 415 THOUSANDS/UL (ref 149–390)
PLATELET # BLD AUTO: 420 THOUSANDS/UL (ref 149–390)
PLATELET BLD QL SMEAR: ABNORMAL
PLATELET BLD QL SMEAR: ABNORMAL
PLATELET BLD QL SMEAR: ADEQUATE
PMV BLD AUTO: 8.7 FL (ref 8.9–12.7)
PMV BLD AUTO: 8.7 FL (ref 8.9–12.7)
PMV BLD AUTO: 8.8 FL (ref 8.9–12.7)
PMV BLD AUTO: 8.9 FL (ref 8.9–12.7)
PMV BLD AUTO: 9 FL (ref 8.9–12.7)
PMV BLD AUTO: 9 FL (ref 8.9–12.7)
PMV BLD AUTO: 9.1 FL (ref 8.9–12.7)
PMV BLD AUTO: 9.2 FL (ref 8.9–12.7)
PMV BLD AUTO: 9.3 FL (ref 8.9–12.7)
PMV BLD AUTO: 9.4 FL (ref 8.9–12.7)
PMV BLD AUTO: 9.6 FL (ref 8.9–12.7)
PMV BLD AUTO: 9.7 FL (ref 8.9–12.7)
PO2 BLDA: 134.4 MM HG (ref 75–129)
PO2 BLDA: 137.4 MM HG (ref 75–129)
PO2 BLDA: 139.3 MM HG (ref 75–129)
PO2 BLDV: 28.5 MM HG (ref 35–45)
PO2 BLDV: 39.4 MM HG (ref 35–45)
PO2 BLDV: 47 MM HG (ref 35–45)
PO2 BLDV: 81.1 MM HG (ref 35–45)
POIKILOCYTOSIS BLD QL SMEAR: PRESENT
POLYCHROMASIA BLD QL SMEAR: PRESENT
POTASSIUM SERPL-SCNC: 3.8 MMOL/L (ref 3.5–5.3)
POTASSIUM SERPL-SCNC: 3.9 MMOL/L (ref 3.5–5.3)
POTASSIUM SERPL-SCNC: 3.9 MMOL/L (ref 3.5–5.3)
POTASSIUM SERPL-SCNC: 4 MMOL/L (ref 3.5–5.3)
POTASSIUM SERPL-SCNC: 4.1 MMOL/L (ref 3.5–5.3)
POTASSIUM SERPL-SCNC: 4.1 MMOL/L (ref 3.5–5.3)
POTASSIUM SERPL-SCNC: 4.2 MMOL/L (ref 3.5–5.3)
POTASSIUM SERPL-SCNC: 4.3 MMOL/L (ref 3.5–5.3)
POTASSIUM SERPL-SCNC: 4.3 MMOL/L (ref 3.5–5.3)
POTASSIUM SERPL-SCNC: 4.4 MMOL/L (ref 3.5–5.3)
POTASSIUM SERPL-SCNC: 4.6 MMOL/L (ref 3.5–5.3)
POTASSIUM SERPL-SCNC: 4.8 MMOL/L (ref 3.5–5.3)
POTASSIUM SERPL-SCNC: 4.9 MMOL/L (ref 3.5–5.3)
POTASSIUM SERPL-SCNC: 5 MMOL/L (ref 3.5–5.3)
POTASSIUM SERPL-SCNC: 5.3 MMOL/L (ref 3.5–5.3)
POTASSIUM SERPL-SCNC: 5.4 MMOL/L (ref 3.5–5.3)
POTASSIUM SERPL-SCNC: 5.4 MMOL/L (ref 3.5–5.3)
POTASSIUM SERPL-SCNC: 5.6 MMOL/L (ref 3.5–5.3)
PR INTERVAL: 158 MS
PR INTERVAL: 171 MS
PR INTERVAL: 183 MS
PR INTERVAL: 192 MS
PR INTERVAL: 192 MS
PR INTERVAL: 213 MS
PROCALCITONIN SERPL-MCNC: 3.7 NG/ML
PROT SERPL-MCNC: 5 G/DL (ref 6.4–8.4)
PROT SERPL-MCNC: 5.1 G/DL (ref 6.4–8.4)
PROT SERPL-MCNC: 5.3 G/DL (ref 6.4–8.4)
PROT SERPL-MCNC: 5.4 G/DL (ref 6.4–8.4)
PROT SERPL-MCNC: 5.7 G/DL (ref 6.4–8.4)
PROT UR STRIP-MCNC: ABNORMAL MG/DL
PROT UR STRIP-MCNC: ABNORMAL MG/DL
QRS AXIS: -51 DEGREES
QRS AXIS: -58 DEGREES
QRS AXIS: 153 DEGREES
QRS AXIS: 163 DEGREES
QRS AXIS: 185 DEGREES
QRS AXIS: 245 DEGREES
QRSD INTERVAL: 125 MS
QRSD INTERVAL: 129 MS
QRSD INTERVAL: 133 MS
QRSD INTERVAL: 138 MS
QT INTERVAL: 358 MS
QT INTERVAL: 383 MS
QT INTERVAL: 392 MS
QT INTERVAL: 442 MS
QT INTERVAL: 446 MS
QT INTERVAL: 479 MS
QTC INTERVAL: 434 MS
QTC INTERVAL: 449 MS
QTC INTERVAL: 461 MS
QTC INTERVAL: 471 MS
QTC INTERVAL: 483 MS
QTC INTERVAL: 488 MS
RA PRESSURE ESTIMATED: 7 MMHG
RBC # BLD AUTO: 2.63 MILLION/UL (ref 3.81–5.12)
RBC # BLD AUTO: 2.65 MILLION/UL (ref 3.81–5.12)
RBC # BLD AUTO: 2.81 MILLION/UL (ref 3.81–5.12)
RBC # BLD AUTO: 2.83 MILLION/UL (ref 3.81–5.12)
RBC # BLD AUTO: 2.93 MILLION/UL (ref 3.81–5.12)
RBC # BLD AUTO: 3 MILLION/UL (ref 3.81–5.12)
RBC # BLD AUTO: 3.06 MILLION/UL (ref 3.81–5.12)
RBC # BLD AUTO: 3.08 MILLION/UL (ref 3.81–5.12)
RBC # BLD AUTO: 3.12 MILLION/UL (ref 3.81–5.12)
RBC # BLD AUTO: 3.12 MILLION/UL (ref 3.81–5.12)
RBC # BLD AUTO: 3.18 MILLION/UL (ref 3.81–5.12)
RBC # BLD AUTO: 3.2 MILLION/UL (ref 3.81–5.12)
RBC # BLD AUTO: 3.23 MILLION/UL (ref 3.81–5.12)
RBC # BLD AUTO: 3.26 MILLION/UL (ref 3.81–5.12)
RBC # BLD AUTO: 3.28 MILLION/UL (ref 3.81–5.12)
RBC # BLD AUTO: 3.32 MILLION/UL (ref 3.81–5.12)
RBC # BLD AUTO: 3.33 MILLION/UL (ref 3.81–5.12)
RBC # BLD AUTO: 3.4 MILLION/UL (ref 3.81–5.12)
RBC #/AREA URNS AUTO: ABNORMAL /HPF
RBC #/AREA URNS AUTO: ABNORMAL /HPF
RBC MORPH BLD: PRESENT
RH BLD: POSITIVE
RHODAMINE-AURAMINE STN SPEC: NORMAL
RHODAMINE-AURAMINE STN SPEC: NORMAL
RIGHT ATRIUM AREA SYSTOLE A4C: 18.4 CM2
RIGHT VENTRICLE ID DIMENSION: 4.4 CM
RV PSP: 42 MMHG
SCAN RESULT: NORMAL
SL CV LEFT ATRIUM LENGTH A2C: 5.4 CM
SL CV LV EF: 40
SL CV PED ECHO LEFT VENTRICLE DIASTOLIC VOLUME (MOD BIPLANE) 2D: 32 ML
SL CV PED ECHO LEFT VENTRICLE SYSTOLIC VOLUME (MOD BIPLANE) 2D: 24 ML
SMUDGE CELLS BLD QL SMEAR: PRESENT
SODIUM 24H UR-SCNC: 16 MOL/L
SODIUM SERPL-SCNC: 118 MMOL/L (ref 135–147)
SODIUM SERPL-SCNC: 120 MMOL/L (ref 135–147)
SODIUM SERPL-SCNC: 122 MMOL/L (ref 135–147)
SODIUM SERPL-SCNC: 124 MMOL/L (ref 135–147)
SODIUM SERPL-SCNC: 124 MMOL/L (ref 135–147)
SODIUM SERPL-SCNC: 125 MMOL/L (ref 135–147)
SODIUM SERPL-SCNC: 125 MMOL/L (ref 135–147)
SODIUM SERPL-SCNC: 126 MMOL/L (ref 135–147)
SODIUM SERPL-SCNC: 126 MMOL/L (ref 135–147)
SODIUM SERPL-SCNC: 128 MMOL/L (ref 135–147)
SODIUM SERPL-SCNC: 129 MMOL/L (ref 135–147)
SODIUM SERPL-SCNC: 130 MMOL/L (ref 135–147)
SODIUM SERPL-SCNC: 130 MMOL/L (ref 135–147)
SODIUM SERPL-SCNC: 131 MMOL/L (ref 135–147)
SODIUM SERPL-SCNC: 134 MMOL/L (ref 135–147)
SODIUM SERPL-SCNC: 135 MMOL/L (ref 135–147)
SODIUM SERPL-SCNC: 137 MMOL/L (ref 135–147)
SODIUM SERPL-SCNC: 140 MMOL/L (ref 135–147)
SODIUM SERPL-SCNC: 142 MMOL/L (ref 135–147)
SODIUM SERPL-SCNC: 142 MMOL/L (ref 135–147)
SODIUM SERPL-SCNC: 143 MMOL/L (ref 135–147)
SODIUM SERPL-SCNC: 144 MMOL/L (ref 135–147)
SP GR UR STRIP.AUTO: 1.01 (ref 1–1.03)
SP GR UR STRIP.AUTO: 1.04 (ref 1–1.03)
SPECIMEN EXPIRATION DATE: NORMAL
SPECIMEN EXPIRATION DATE: NORMAL
SPECIMEN SOURCE: ABNORMAL
T WAVE AXIS: 10 DEGREES
T WAVE AXIS: 110 DEGREES
T WAVE AXIS: 74 DEGREES
T WAVE AXIS: 81 DEGREES
T WAVE AXIS: 83 DEGREES
T WAVE AXIS: 97 DEGREES
T4 FREE SERPL-MCNC: 1.32 NG/DL (ref 0.61–1.12)
TARGETS BLD QL SMEAR: PRESENT
TARGETS BLD QL SMEAR: PRESENT
TOTAL CELLS COUNTED SPEC: 100
TR MAX PG: 35 MMHG
TR PEAK VELOCITY: 3 M/S
TRICUSPID ANNULAR PLANE SYSTOLIC EXCURSION: 1.7 CM
TRICUSPID VALVE PEAK REGURGITATION VELOCITY: 2.95 M/S
TSH SERPL DL<=0.05 MIU/L-ACNC: 2.13 UIU/ML (ref 0.45–4.5)
UNIT DISPENSE STATUS: NORMAL
UNIT DISPENSE STATUS: NORMAL
UNIT PRODUCT CODE: NORMAL
UNIT PRODUCT CODE: NORMAL
UNIT PRODUCT VOLUME: 350 ML
UNIT PRODUCT VOLUME: 350 ML
UNIT RH: NORMAL
UNIT RH: NORMAL
URATE SERPL-MCNC: 5.7 MG/DL (ref 2–7.5)
UROBILINOGEN UR STRIP-ACNC: <2 MG/DL
UROBILINOGEN UR STRIP-ACNC: <2 MG/DL
VARIANT LYMPHS # BLD AUTO: 1 %
VARIANT LYMPHS # BLD AUTO: 1 %
VARIANT LYMPHS # BLD AUTO: 3 %
VENT AC: 14
VENT AC: 14
VENT- AC: AC
VENT- AC: AC
VENTRICULAR RATE: 109 BPM
VENTRICULAR RATE: 58 BPM
VENTRICULAR RATE: 62 BPM
VENTRICULAR RATE: 64 BPM
VENTRICULAR RATE: 77 BPM
VENTRICULAR RATE: 93 BPM
VT SETTING VENT: 350 ML
VT SETTING VENT: 350 ML
WBC # BLD AUTO: 18.19 THOUSAND/UL (ref 4.31–10.16)
WBC # BLD AUTO: 18.79 THOUSAND/UL (ref 4.31–10.16)
WBC # BLD AUTO: 20.33 THOUSAND/UL (ref 4.31–10.16)
WBC # BLD AUTO: 21.58 THOUSAND/UL (ref 4.31–10.16)
WBC # BLD AUTO: 25.19 THOUSAND/UL (ref 4.31–10.16)
WBC # BLD AUTO: 29.36 THOUSAND/UL (ref 4.31–10.16)
WBC # BLD AUTO: 31.69 THOUSAND/UL (ref 4.31–10.16)
WBC # BLD AUTO: 33.31 THOUSAND/UL (ref 4.31–10.16)
WBC # BLD AUTO: 35.22 THOUSAND/UL (ref 4.31–10.16)
WBC # BLD AUTO: 40.14 THOUSAND/UL (ref 4.31–10.16)
WBC # BLD AUTO: 44.58 THOUSAND/UL (ref 4.31–10.16)
WBC # BLD AUTO: 46.09 THOUSAND/UL (ref 4.31–10.16)
WBC # BLD AUTO: 48.45 THOUSAND/UL (ref 4.31–10.16)
WBC # BLD AUTO: 49.23 THOUSAND/UL (ref 4.31–10.16)
WBC # BLD AUTO: 50.63 THOUSAND/UL (ref 4.31–10.16)
WBC # BLD AUTO: 62.59 THOUSAND/UL (ref 4.31–10.16)
WBC # BLD AUTO: 72.36 THOUSAND/UL (ref 4.31–10.16)
WBC # BLD AUTO: 74.56 THOUSAND/UL (ref 4.31–10.16)
WBC #/AREA URNS AUTO: ABNORMAL /HPF
WBC #/AREA URNS AUTO: ABNORMAL /HPF
WBC CLUMPS # UR AUTO: PRESENT /UL
WBC TOXIC VACUOLES BLD QL SMEAR: PRESENT

## 2023-01-01 PROCEDURE — 92526 ORAL FUNCTION THERAPY: CPT

## 2023-01-01 PROCEDURE — 84100 ASSAY OF PHOSPHORUS: CPT | Performed by: SURGERY

## 2023-01-01 PROCEDURE — 80048 BASIC METABOLIC PNL TOTAL CA: CPT | Performed by: SURGERY

## 2023-01-01 PROCEDURE — 5A1935Z RESPIRATORY VENTILATION, LESS THAN 24 CONSECUTIVE HOURS: ICD-10-PCS | Performed by: SURGERY

## 2023-01-01 PROCEDURE — 94760 N-INVAS EAR/PLS OXIMETRY 1: CPT

## 2023-01-01 PROCEDURE — 99232 SBSQ HOSP IP/OBS MODERATE 35: CPT | Performed by: INTERNAL MEDICINE

## 2023-01-01 PROCEDURE — 86900 BLOOD TYPING SEROLOGIC ABO: CPT | Performed by: SURGERY

## 2023-01-01 PROCEDURE — 85027 COMPLETE CBC AUTOMATED: CPT | Performed by: SURGERY

## 2023-01-01 PROCEDURE — 78226 HEPATOBILIARY SYSTEM IMAGING: CPT

## 2023-01-01 PROCEDURE — 83735 ASSAY OF MAGNESIUM: CPT | Performed by: PHYSICIAN ASSISTANT

## 2023-01-01 PROCEDURE — 82948 REAGENT STRIP/BLOOD GLUCOSE: CPT

## 2023-01-01 PROCEDURE — 71250 CT THORAX DX C-: CPT

## 2023-01-01 PROCEDURE — 85025 COMPLETE CBC W/AUTO DIFF WBC: CPT

## 2023-01-01 PROCEDURE — 80048 BASIC METABOLIC PNL TOTAL CA: CPT

## 2023-01-01 PROCEDURE — 88374 M/PHMTRC ALYS ISHQUANT/SEMIQ: CPT | Performed by: STUDENT IN AN ORGANIZED HEALTH CARE EDUCATION/TRAINING PROGRAM

## 2023-01-01 PROCEDURE — 99232 SBSQ HOSP IP/OBS MODERATE 35: CPT | Performed by: STUDENT IN AN ORGANIZED HEALTH CARE EDUCATION/TRAINING PROGRAM

## 2023-01-01 PROCEDURE — 83735 ASSAY OF MAGNESIUM: CPT | Performed by: SURGERY

## 2023-01-01 PROCEDURE — 93306 TTE W/DOPPLER COMPLETE: CPT | Performed by: INTERNAL MEDICINE

## 2023-01-01 PROCEDURE — 85007 BL SMEAR W/DIFF WBC COUNT: CPT | Performed by: PHYSICIAN ASSISTANT

## 2023-01-01 PROCEDURE — 99222 1ST HOSP IP/OBS MODERATE 55: CPT | Performed by: ORTHOPAEDIC SURGERY

## 2023-01-01 PROCEDURE — 86850 RBC ANTIBODY SCREEN: CPT | Performed by: SURGERY

## 2023-01-01 PROCEDURE — 99024 POSTOP FOLLOW-UP VISIT: CPT | Performed by: STUDENT IN AN ORGANIZED HEALTH CARE EDUCATION/TRAINING PROGRAM

## 2023-01-01 PROCEDURE — 85007 BL SMEAR W/DIFF WBC COUNT: CPT | Performed by: SURGERY

## 2023-01-01 PROCEDURE — 85027 COMPLETE CBC AUTOMATED: CPT | Performed by: PHYSICIAN ASSISTANT

## 2023-01-01 PROCEDURE — 83735 ASSAY OF MAGNESIUM: CPT | Performed by: INTERNAL MEDICINE

## 2023-01-01 PROCEDURE — 97116 GAIT TRAINING THERAPY: CPT

## 2023-01-01 PROCEDURE — 99233 SBSQ HOSP IP/OBS HIGH 50: CPT | Performed by: SURGERY

## 2023-01-01 PROCEDURE — 83605 ASSAY OF LACTIC ACID: CPT | Performed by: SURGERY

## 2023-01-01 PROCEDURE — 82805 BLOOD GASES W/O2 SATURATION: CPT | Performed by: SURGERY

## 2023-01-01 PROCEDURE — 87205 SMEAR GRAM STAIN: CPT | Performed by: SURGERY

## 2023-01-01 PROCEDURE — C9113 INJ PANTOPRAZOLE SODIUM, VIA: HCPCS | Performed by: SURGERY

## 2023-01-01 PROCEDURE — 96365 THER/PROPH/DIAG IV INF INIT: CPT

## 2023-01-01 PROCEDURE — 83735 ASSAY OF MAGNESIUM: CPT

## 2023-01-01 PROCEDURE — 80069 RENAL FUNCTION PANEL: CPT | Performed by: PHYSICIAN ASSISTANT

## 2023-01-01 PROCEDURE — 84145 PROCALCITONIN (PCT): CPT | Performed by: SURGERY

## 2023-01-01 PROCEDURE — 0D1L4Z4 BYPASS TRANSVERSE COLON TO CUTANEOUS, PERCUTANEOUS ENDOSCOPIC APPROACH: ICD-10-PCS | Performed by: SURGERY

## 2023-01-01 PROCEDURE — 80048 BASIC METABOLIC PNL TOTAL CA: CPT | Performed by: PHYSICIAN ASSISTANT

## 2023-01-01 PROCEDURE — 80076 HEPATIC FUNCTION PANEL: CPT

## 2023-01-01 PROCEDURE — 76705 ECHO EXAM OF ABDOMEN: CPT

## 2023-01-01 PROCEDURE — 71275 CT ANGIOGRAPHY CHEST: CPT

## 2023-01-01 PROCEDURE — 94002 VENT MGMT INPAT INIT DAY: CPT

## 2023-01-01 PROCEDURE — 0D9QXZZ DRAINAGE OF ANUS, EXTERNAL APPROACH: ICD-10-PCS | Performed by: SURGERY

## 2023-01-01 PROCEDURE — 87077 CULTURE AEROBIC IDENTIFY: CPT | Performed by: EMERGENCY MEDICINE

## 2023-01-01 PROCEDURE — 88185 FLOWCYTOMETRY/TC ADD-ON: CPT

## 2023-01-01 PROCEDURE — 99222 1ST HOSP IP/OBS MODERATE 55: CPT | Performed by: INTERNAL MEDICINE

## 2023-01-01 PROCEDURE — 81001 URINALYSIS AUTO W/SCOPE: CPT | Performed by: EMERGENCY MEDICINE

## 2023-01-01 PROCEDURE — 82330 ASSAY OF CALCIUM: CPT

## 2023-01-01 PROCEDURE — 74176 CT ABD & PELVIS W/O CONTRAST: CPT

## 2023-01-01 PROCEDURE — 80048 BASIC METABOLIC PNL TOTAL CA: CPT | Performed by: INTERNAL MEDICINE

## 2023-01-01 PROCEDURE — 87086 URINE CULTURE/COLONY COUNT: CPT | Performed by: EMERGENCY MEDICINE

## 2023-01-01 PROCEDURE — 80053 COMPREHEN METABOLIC PANEL: CPT | Performed by: SURGERY

## 2023-01-01 PROCEDURE — 71045 X-RAY EXAM CHEST 1 VIEW: CPT

## 2023-01-01 PROCEDURE — 81001 URINALYSIS AUTO W/SCOPE: CPT | Performed by: SURGERY

## 2023-01-01 PROCEDURE — 02HV33Z INSERTION OF INFUSION DEVICE INTO SUPERIOR VENA CAVA, PERCUTANEOUS APPROACH: ICD-10-PCS | Performed by: SURGERY

## 2023-01-01 PROCEDURE — G1004 CDSM NDSC: HCPCS

## 2023-01-01 PROCEDURE — 87075 CULTR BACTERIA EXCEPT BLOOD: CPT | Performed by: SURGERY

## 2023-01-01 PROCEDURE — 87116 MYCOBACTERIA CULTURE: CPT | Performed by: SURGERY

## 2023-01-01 PROCEDURE — 85007 BL SMEAR W/DIFF WBC COUNT: CPT

## 2023-01-01 PROCEDURE — 99285 EMERGENCY DEPT VISIT HI MDM: CPT

## 2023-01-01 PROCEDURE — 87040 BLOOD CULTURE FOR BACTERIA: CPT | Performed by: SURGERY

## 2023-01-01 PROCEDURE — 99291 CRITICAL CARE FIRST HOUR: CPT | Performed by: SURGERY

## 2023-01-01 PROCEDURE — 72192 CT PELVIS W/O DYE: CPT

## 2023-01-01 PROCEDURE — 94664 DEMO&/EVAL PT USE INHALER: CPT

## 2023-01-01 PROCEDURE — 87206 SMEAR FLUORESCENT/ACID STAI: CPT | Performed by: SURGERY

## 2023-01-01 PROCEDURE — 87102 FUNGUS ISOLATION CULTURE: CPT | Performed by: SURGERY

## 2023-01-01 PROCEDURE — 96361 HYDRATE IV INFUSION ADD-ON: CPT

## 2023-01-01 PROCEDURE — 99232 SBSQ HOSP IP/OBS MODERATE 35: CPT | Performed by: PHYSICIAN ASSISTANT

## 2023-01-01 PROCEDURE — 82533 TOTAL CORTISOL: CPT | Performed by: INTERNAL MEDICINE

## 2023-01-01 PROCEDURE — 94003 VENT MGMT INPAT SUBQ DAY: CPT

## 2023-01-01 PROCEDURE — 84300 ASSAY OF URINE SODIUM: CPT | Performed by: SURGERY

## 2023-01-01 PROCEDURE — 85027 COMPLETE CBC AUTOMATED: CPT

## 2023-01-01 PROCEDURE — 99232 SBSQ HOSP IP/OBS MODERATE 35: CPT | Performed by: SURGERY

## 2023-01-01 PROCEDURE — 87186 SC STD MICRODIL/AGAR DIL: CPT | Performed by: EMERGENCY MEDICINE

## 2023-01-01 PROCEDURE — NC001 PR NO CHARGE: Performed by: SURGERY

## 2023-01-01 PROCEDURE — 84100 ASSAY OF PHOSPHORUS: CPT

## 2023-01-01 PROCEDURE — 87070 CULTURE OTHR SPECIMN AEROBIC: CPT | Performed by: SURGERY

## 2023-01-01 PROCEDURE — NC001 PR NO CHARGE

## 2023-01-01 PROCEDURE — 83880 ASSAY OF NATRIURETIC PEPTIDE: CPT

## 2023-01-01 PROCEDURE — 88184 FLOWCYTOMETRY/ TC 1 MARKER: CPT | Performed by: STUDENT IN AN ORGANIZED HEALTH CARE EDUCATION/TRAINING PROGRAM

## 2023-01-01 PROCEDURE — 92610 EVALUATE SWALLOWING FUNCTION: CPT

## 2023-01-01 PROCEDURE — 11004 DBRDMT SKIN XTRNL GENT&PER: CPT | Performed by: SURGERY

## 2023-01-01 PROCEDURE — 84443 ASSAY THYROID STIM HORMONE: CPT | Performed by: INTERNAL MEDICINE

## 2023-01-01 PROCEDURE — 93005 ELECTROCARDIOGRAM TRACING: CPT

## 2023-01-01 PROCEDURE — 0JDB0ZZ EXTRACTION OF PERINEUM SUBCUTANEOUS TISSUE AND FASCIA, OPEN APPROACH: ICD-10-PCS | Performed by: SURGERY

## 2023-01-01 PROCEDURE — 36415 COLL VENOUS BLD VENIPUNCTURE: CPT

## 2023-01-01 PROCEDURE — 0T9B70Z DRAINAGE OF BLADDER WITH DRAINAGE DEVICE, VIA NATURAL OR ARTIFICIAL OPENING: ICD-10-PCS | Performed by: SURGERY

## 2023-01-01 PROCEDURE — 82330 ASSAY OF CALCIUM: CPT | Performed by: SURGERY

## 2023-01-01 PROCEDURE — 94640 AIRWAY INHALATION TREATMENT: CPT

## 2023-01-01 PROCEDURE — 86920 COMPATIBILITY TEST SPIN: CPT

## 2023-01-01 PROCEDURE — A9537 TC99M MEBROFENIN: HCPCS

## 2023-01-01 PROCEDURE — 97167 OT EVAL HIGH COMPLEX 60 MIN: CPT

## 2023-01-01 PROCEDURE — 97530 THERAPEUTIC ACTIVITIES: CPT

## 2023-01-01 PROCEDURE — 44188 LAP COLOSTOMY: CPT | Performed by: SURGERY

## 2023-01-01 PROCEDURE — 99238 HOSP IP/OBS DSCHRG MGMT 30/<: CPT | Performed by: SURGERY

## 2023-01-01 PROCEDURE — 93010 ELECTROCARDIOGRAM REPORT: CPT | Performed by: INTERNAL MEDICINE

## 2023-01-01 PROCEDURE — 86901 BLOOD TYPING SEROLOGIC RH(D): CPT | Performed by: SURGERY

## 2023-01-01 PROCEDURE — 99497 ADVNCD CARE PLAN 30 MIN: CPT

## 2023-01-01 PROCEDURE — 83605 ASSAY OF LACTIC ACID: CPT

## 2023-01-01 PROCEDURE — 87040 BLOOD CULTURE FOR BACTERIA: CPT | Performed by: PHYSICIAN ASSISTANT

## 2023-01-01 PROCEDURE — 97110 THERAPEUTIC EXERCISES: CPT

## 2023-01-01 PROCEDURE — 82805 BLOOD GASES W/O2 SATURATION: CPT

## 2023-01-01 PROCEDURE — 5A1945Z RESPIRATORY VENTILATION, 24-96 CONSECUTIVE HOURS: ICD-10-PCS | Performed by: SURGERY

## 2023-01-01 PROCEDURE — 84100 ASSAY OF PHOSPHORUS: CPT | Performed by: INTERNAL MEDICINE

## 2023-01-01 PROCEDURE — 84439 ASSAY OF FREE THYROXINE: CPT | Performed by: INTERNAL MEDICINE

## 2023-01-01 PROCEDURE — 84550 ASSAY OF BLOOD/URIC ACID: CPT | Performed by: INTERNAL MEDICINE

## 2023-01-01 PROCEDURE — 99222 1ST HOSP IP/OBS MODERATE 55: CPT | Performed by: SURGERY

## 2023-01-01 PROCEDURE — 0KBM0ZZ EXCISION OF PERINEUM MUSCLE, OPEN APPROACH: ICD-10-PCS | Performed by: SURGERY

## 2023-01-01 PROCEDURE — 83930 ASSAY OF BLOOD OSMOLALITY: CPT | Performed by: SURGERY

## 2023-01-01 PROCEDURE — 0KDM0ZZ EXTRACTION OF PERINEUM MUSCLE, OPEN APPROACH: ICD-10-PCS | Performed by: SURGERY

## 2023-01-01 PROCEDURE — NC001 PR NO CHARGE: Performed by: PHYSICIAN ASSISTANT

## 2023-01-01 PROCEDURE — 97535 SELF CARE MNGMENT TRAINING: CPT

## 2023-01-01 PROCEDURE — 87081 CULTURE SCREEN ONLY: CPT

## 2023-01-01 PROCEDURE — 80076 HEPATIC FUNCTION PANEL: CPT | Performed by: PHYSICIAN ASSISTANT

## 2023-01-01 PROCEDURE — 83935 ASSAY OF URINE OSMOLALITY: CPT | Performed by: SURGERY

## 2023-01-01 PROCEDURE — 81263 IGH VARI REGIONAL MUTATION: CPT | Performed by: STUDENT IN AN ORGANIZED HEALTH CARE EDUCATION/TRAINING PROGRAM

## 2023-01-01 PROCEDURE — 99233 SBSQ HOSP IP/OBS HIGH 50: CPT | Performed by: INTERNAL MEDICINE

## 2023-01-01 PROCEDURE — 99223 1ST HOSP IP/OBS HIGH 75: CPT | Performed by: INTERNAL MEDICINE

## 2023-01-01 PROCEDURE — 99222 1ST HOSP IP/OBS MODERATE 55: CPT

## 2023-01-01 PROCEDURE — 84484 ASSAY OF TROPONIN QUANT: CPT | Performed by: SURGERY

## 2023-01-01 PROCEDURE — 99285 EMERGENCY DEPT VISIT HI MDM: CPT | Performed by: EMERGENCY MEDICINE

## 2023-01-01 PROCEDURE — 97163 PT EVAL HIGH COMPLEX 45 MIN: CPT

## 2023-01-01 PROCEDURE — 84484 ASSAY OF TROPONIN QUANT: CPT

## 2023-01-01 PROCEDURE — 74177 CT ABD & PELVIS W/CONTRAST: CPT

## 2023-01-01 PROCEDURE — 87086 URINE CULTURE/COLONY COUNT: CPT | Performed by: SURGERY

## 2023-01-01 PROCEDURE — 85025 COMPLETE CBC W/AUTO DIFF WBC: CPT | Performed by: SURGERY

## 2023-01-01 PROCEDURE — 93306 TTE W/DOPPLER COMPLETE: CPT

## 2023-01-01 PROCEDURE — 73521 X-RAY EXAM HIPS BI 2 VIEWS: CPT

## 2023-01-01 RX ORDER — MAGNESIUM HYDROXIDE 1200 MG/15ML
LIQUID ORAL AS NEEDED
Status: DISCONTINUED | OUTPATIENT
Start: 2023-01-01 | End: 2023-01-01 | Stop reason: HOSPADM

## 2023-01-01 RX ORDER — ACETAMINOPHEN 325 MG/1
650 TABLET ORAL EVERY 6 HOURS PRN
Status: DISCONTINUED | OUTPATIENT
Start: 2023-01-01 | End: 2023-01-01

## 2023-01-01 RX ORDER — SODIUM CHLORIDE, SODIUM GLUCONATE, SODIUM ACETATE, POTASSIUM CHLORIDE, MAGNESIUM CHLORIDE, SODIUM PHOSPHATE, DIBASIC, AND POTASSIUM PHOSPHATE .53; .5; .37; .037; .03; .012; .00082 G/100ML; G/100ML; G/100ML; G/100ML; G/100ML; G/100ML; G/100ML
75 INJECTION, SOLUTION INTRAVENOUS CONTINUOUS
Status: DISCONTINUED | OUTPATIENT
Start: 2023-01-01 | End: 2023-01-01

## 2023-01-01 RX ORDER — OXYCODONE HYDROCHLORIDE 5 MG/1
5 TABLET ORAL EVERY 6 HOURS PRN
Status: DISCONTINUED | OUTPATIENT
Start: 2023-01-01 | End: 2023-01-01

## 2023-01-01 RX ORDER — LIDOCAINE HYDROCHLORIDE 10 MG/ML
10 INJECTION, SOLUTION EPIDURAL; INFILTRATION; INTRACAUDAL; PERINEURAL ONCE
Status: COMPLETED | OUTPATIENT
Start: 2023-01-01 | End: 2023-01-01

## 2023-01-01 RX ORDER — SODIUM BICARBONATE 650 MG/1
1300 TABLET ORAL
Status: DISCONTINUED | OUTPATIENT
Start: 2023-01-01 | End: 2023-01-01

## 2023-01-01 RX ORDER — HEPARIN SODIUM 5000 [USP'U]/ML
5000 INJECTION, SOLUTION INTRAVENOUS; SUBCUTANEOUS EVERY 8 HOURS SCHEDULED
Status: DISCONTINUED | OUTPATIENT
Start: 2023-01-01 | End: 2023-01-01

## 2023-01-01 RX ORDER — CALCIUM GLUCONATE 20 MG/ML
2 INJECTION, SOLUTION INTRAVENOUS ONCE
Status: COMPLETED | OUTPATIENT
Start: 2023-01-01 | End: 2023-01-01

## 2023-01-01 RX ORDER — FENTANYL CITRATE-0.9 % NACL/PF 10 MCG/ML
75 PLASTIC BAG, INJECTION (ML) INTRAVENOUS CONTINUOUS
Status: DISCONTINUED | OUTPATIENT
Start: 2023-01-01 | End: 2023-01-01

## 2023-01-01 RX ORDER — PANTOPRAZOLE SODIUM 40 MG/10ML
40 INJECTION, POWDER, LYOPHILIZED, FOR SOLUTION INTRAVENOUS
Status: DISCONTINUED | OUTPATIENT
Start: 2023-01-01 | End: 2023-01-01

## 2023-01-01 RX ORDER — SODIUM CHLORIDE, SODIUM GLUCONATE, SODIUM ACETATE, POTASSIUM CHLORIDE, MAGNESIUM CHLORIDE, SODIUM PHOSPHATE, DIBASIC, AND POTASSIUM PHOSPHATE .53; .5; .37; .037; .03; .012; .00082 G/100ML; G/100ML; G/100ML; G/100ML; G/100ML; G/100ML; G/100ML
500 INJECTION, SOLUTION INTRAVENOUS ONCE
Status: COMPLETED | OUTPATIENT
Start: 2023-01-01 | End: 2023-01-01

## 2023-01-01 RX ORDER — VANCOMYCIN HYDROCHLORIDE 1 G/200ML
INJECTION, SOLUTION INTRAVENOUS AS NEEDED
Status: DISCONTINUED | OUTPATIENT
Start: 2023-01-01 | End: 2023-01-01

## 2023-01-01 RX ORDER — PROPOFOL 10 MG/ML
5-50 INJECTION, EMULSION INTRAVENOUS
Status: DISCONTINUED | OUTPATIENT
Start: 2023-01-01 | End: 2023-01-01

## 2023-01-01 RX ORDER — SODIUM CHLORIDE 1 G/1
2 TABLET ORAL
Status: DISCONTINUED | OUTPATIENT
Start: 2023-01-01 | End: 2023-01-01

## 2023-01-01 RX ORDER — ALBUTEROL SULFATE 2.5 MG/3ML
2.5 SOLUTION RESPIRATORY (INHALATION) ONCE AS NEEDED
Status: COMPLETED | OUTPATIENT
Start: 2023-01-01 | End: 2023-01-01

## 2023-01-01 RX ORDER — FENTANYL CITRATE/PF 50 MCG/ML
12.5 SYRINGE (ML) INJECTION
Status: DISCONTINUED | OUTPATIENT
Start: 2023-01-01 | End: 2023-01-01

## 2023-01-01 RX ORDER — MINERAL OIL AND PETROLATUM 150; 830 MG/G; MG/G
OINTMENT OPHTHALMIC
Status: DISCONTINUED | OUTPATIENT
Start: 2023-01-01 | End: 2023-01-01 | Stop reason: HOSPADM

## 2023-01-01 RX ORDER — HYDROMORPHONE HCL IN WATER/PF 6 MG/30 ML
0.2 PATIENT CONTROLLED ANALGESIA SYRINGE INTRAVENOUS ONCE AS NEEDED
Status: DISCONTINUED | OUTPATIENT
Start: 2023-01-01 | End: 2023-01-01

## 2023-01-01 RX ORDER — ALBUMIN, HUMAN INJ 5% 5 %
12.5 SOLUTION INTRAVENOUS ONCE
Status: COMPLETED | OUTPATIENT
Start: 2023-01-01 | End: 2023-01-01

## 2023-01-01 RX ORDER — XYLITOL/YERBA SANTA
5 AEROSOL, SPRAY WITH PUMP (ML) MUCOUS MEMBRANE 4 TIMES DAILY PRN
Status: DISCONTINUED | OUTPATIENT
Start: 2023-01-01 | End: 2023-01-01 | Stop reason: HOSPADM

## 2023-01-01 RX ORDER — OXYCODONE HYDROCHLORIDE 5 MG/1
5 TABLET ORAL EVERY 4 HOURS PRN
Status: DISCONTINUED | OUTPATIENT
Start: 2023-01-01 | End: 2023-01-01

## 2023-01-01 RX ORDER — LOSARTAN POTASSIUM 50 MG/1
100 TABLET ORAL DAILY
Status: DISCONTINUED | OUTPATIENT
Start: 2023-01-01 | End: 2023-01-01

## 2023-01-01 RX ORDER — HYDROMORPHONE HCL IN WATER/PF 6 MG/30 ML
0.2 PATIENT CONTROLLED ANALGESIA SYRINGE INTRAVENOUS EVERY 4 HOURS PRN
Status: DISCONTINUED | OUTPATIENT
Start: 2023-01-01 | End: 2023-01-01

## 2023-01-01 RX ORDER — ALBUMIN (HUMAN) 12.5 G/50ML
12.5 SOLUTION INTRAVENOUS ONCE
Status: COMPLETED | OUTPATIENT
Start: 2023-01-01 | End: 2023-01-01

## 2023-01-01 RX ORDER — MIDODRINE HYDROCHLORIDE 2.5 MG/1
2.5 TABLET ORAL
Status: DISCONTINUED | OUTPATIENT
Start: 2023-01-01 | End: 2023-01-01

## 2023-01-01 RX ORDER — PANTOPRAZOLE SODIUM 40 MG/1
40 TABLET, DELAYED RELEASE ORAL
Status: DISCONTINUED | OUTPATIENT
Start: 2023-01-01 | End: 2023-01-01

## 2023-01-01 RX ORDER — CHLORHEXIDINE GLUCONATE 0.12 MG/ML
15 RINSE ORAL EVERY 12 HOURS SCHEDULED
Status: DISCONTINUED | OUTPATIENT
Start: 2023-01-01 | End: 2023-01-01

## 2023-01-01 RX ORDER — LOSARTAN POTASSIUM 25 MG/1
25 TABLET ORAL DAILY
Status: DISCONTINUED | OUTPATIENT
Start: 2023-01-01 | End: 2023-01-01

## 2023-01-01 RX ORDER — MAGNESIUM SULFATE 1 G/100ML
1 INJECTION INTRAVENOUS
Status: COMPLETED | OUTPATIENT
Start: 2023-01-01 | End: 2023-01-01

## 2023-01-01 RX ORDER — SODIUM CHLORIDE 1 G/1
3 TABLET ORAL ONCE
Status: COMPLETED | OUTPATIENT
Start: 2023-01-01 | End: 2023-01-01

## 2023-01-01 RX ORDER — SODIUM CHLORIDE 1 G/1
1 TABLET ORAL
Status: DISCONTINUED | OUTPATIENT
Start: 2023-01-01 | End: 2023-01-01

## 2023-01-01 RX ORDER — LORAZEPAM 2 MG/ML
1 INJECTION INTRAMUSCULAR
Status: DISCONTINUED | OUTPATIENT
Start: 2023-01-01 | End: 2023-01-01

## 2023-01-01 RX ORDER — DEXAMETHASONE SODIUM PHOSPHATE 10 MG/ML
INJECTION, SOLUTION INTRAMUSCULAR; INTRAVENOUS AS NEEDED
Status: DISCONTINUED | OUTPATIENT
Start: 2023-01-01 | End: 2023-01-01

## 2023-01-01 RX ORDER — PROPOFOL 10 MG/ML
INJECTION, EMULSION INTRAVENOUS AS NEEDED
Status: DISCONTINUED | OUTPATIENT
Start: 2023-01-01 | End: 2023-01-01

## 2023-01-01 RX ORDER — FAMOTIDINE 20 MG/1
20 TABLET, FILM COATED ORAL DAILY
Status: DISCONTINUED | OUTPATIENT
Start: 2023-01-01 | End: 2023-01-01

## 2023-01-01 RX ORDER — FUROSEMIDE 10 MG/ML
40 INJECTION INTRAMUSCULAR; INTRAVENOUS ONCE
Status: COMPLETED | OUTPATIENT
Start: 2023-01-01 | End: 2023-01-01

## 2023-01-01 RX ORDER — METRONIDAZOLE 500 MG/100ML
500 INJECTION, SOLUTION INTRAVENOUS EVERY 8 HOURS
Status: DISCONTINUED | OUTPATIENT
Start: 2023-01-01 | End: 2023-01-01

## 2023-01-01 RX ORDER — HEPARIN SODIUM 5000 [USP'U]/ML
5000 INJECTION, SOLUTION INTRAVENOUS; SUBCUTANEOUS EVERY 8 HOURS SCHEDULED
Status: DISCONTINUED | OUTPATIENT
Start: 2023-01-01 | End: 2023-01-01 | Stop reason: SDUPTHER

## 2023-01-01 RX ORDER — LIDOCAINE HYDROCHLORIDE 10 MG/ML
INJECTION, SOLUTION EPIDURAL; INFILTRATION; INTRACAUDAL; PERINEURAL AS NEEDED
Status: DISCONTINUED | OUTPATIENT
Start: 2023-01-01 | End: 2023-01-01

## 2023-01-01 RX ORDER — LANOLIN ALCOHOL/MO/W.PET/CERES
3 CREAM (GRAM) TOPICAL
Status: DISCONTINUED | OUTPATIENT
Start: 2023-01-01 | End: 2023-01-01

## 2023-01-01 RX ORDER — LORAZEPAM 2 MG/ML
1 INJECTION INTRAMUSCULAR EVERY 2 HOUR PRN
Status: DISCONTINUED | OUTPATIENT
Start: 2023-01-01 | End: 2023-01-01 | Stop reason: HOSPADM

## 2023-01-01 RX ORDER — ROCURONIUM BROMIDE 10 MG/ML
INJECTION, SOLUTION INTRAVENOUS AS NEEDED
Status: DISCONTINUED | OUTPATIENT
Start: 2023-01-01 | End: 2023-01-01

## 2023-01-01 RX ORDER — ACETAMINOPHEN 325 MG/1
650 TABLET ORAL EVERY 6 HOURS SCHEDULED
Status: DISCONTINUED | OUTPATIENT
Start: 2023-01-01 | End: 2023-01-01

## 2023-01-01 RX ORDER — ACETAMINOPHEN 325 MG/1
975 TABLET ORAL ONCE
Status: COMPLETED | OUTPATIENT
Start: 2023-01-01 | End: 2023-01-01

## 2023-01-01 RX ORDER — POLYETHYLENE GLYCOL 3350 17 G/17G
17 POWDER, FOR SOLUTION ORAL DAILY
Status: DISCONTINUED | OUTPATIENT
Start: 2023-01-01 | End: 2023-01-01

## 2023-01-01 RX ORDER — AMOXICILLIN 250 MG
1 CAPSULE ORAL
Status: DISCONTINUED | OUTPATIENT
Start: 2023-01-01 | End: 2023-01-01

## 2023-01-01 RX ORDER — FENTANYL CITRATE 50 UG/ML
INJECTION, SOLUTION INTRAMUSCULAR; INTRAVENOUS AS NEEDED
Status: DISCONTINUED | OUTPATIENT
Start: 2023-01-01 | End: 2023-01-01

## 2023-01-01 RX ORDER — FENTANYL CITRATE 50 UG/ML
50 INJECTION, SOLUTION INTRAMUSCULAR; INTRAVENOUS ONCE AS NEEDED
Status: DISCONTINUED | OUTPATIENT
Start: 2023-01-01 | End: 2023-01-01

## 2023-01-01 RX ORDER — SODIUM CHLORIDE, SODIUM LACTATE, POTASSIUM CHLORIDE, CALCIUM CHLORIDE 600; 310; 30; 20 MG/100ML; MG/100ML; MG/100ML; MG/100ML
INJECTION, SOLUTION INTRAVENOUS CONTINUOUS PRN
Status: DISCONTINUED | OUTPATIENT
Start: 2023-01-01 | End: 2023-01-01

## 2023-01-01 RX ORDER — GLYCOPYRROLATE 0.2 MG/ML
0.1 INJECTION INTRAMUSCULAR; INTRAVENOUS EVERY 4 HOURS PRN
Status: DISCONTINUED | OUTPATIENT
Start: 2023-01-01 | End: 2023-01-01

## 2023-01-01 RX ORDER — ENOXAPARIN SODIUM 100 MG/ML
30 INJECTION SUBCUTANEOUS ONCE
Status: DISCONTINUED | OUTPATIENT
Start: 2023-01-01 | End: 2023-01-01

## 2023-01-01 RX ORDER — FUROSEMIDE 10 MG/ML
80 INJECTION INTRAMUSCULAR; INTRAVENOUS ONCE
Status: COMPLETED | OUTPATIENT
Start: 2023-01-01 | End: 2023-01-01

## 2023-01-01 RX ORDER — FENTANYL CITRATE 50 UG/ML
50 INJECTION, SOLUTION INTRAMUSCULAR; INTRAVENOUS
Status: DISCONTINUED | OUTPATIENT
Start: 2023-01-01 | End: 2023-01-01

## 2023-01-01 RX ORDER — SENNOSIDES 8.6 MG
2 TABLET ORAL DAILY
Status: DISCONTINUED | OUTPATIENT
Start: 2023-01-01 | End: 2023-01-01

## 2023-01-01 RX ORDER — ENOXAPARIN SODIUM 100 MG/ML
30 INJECTION SUBCUTANEOUS EVERY 12 HOURS SCHEDULED
Status: DISCONTINUED | OUTPATIENT
Start: 2023-01-01 | End: 2023-01-01

## 2023-01-01 RX ADMIN — PIPERACILLIN SODIUM AND TAZOBACTAM SODIUM 3.38 G: 36; 4.5 INJECTION, POWDER, LYOPHILIZED, FOR SOLUTION INTRAVENOUS at 18:28

## 2023-01-01 RX ADMIN — Medication 2.5 MG: at 20:43

## 2023-01-01 RX ADMIN — CHLORHEXIDINE GLUCONATE 15 ML: 1.2 SOLUTION ORAL at 15:45

## 2023-01-01 RX ADMIN — CARBAMIDE PEROXIDE 6.5% 5 DROP: 6.5 LIQUID AURICULAR (OTIC) at 20:46

## 2023-01-01 RX ADMIN — SODIUM CHLORIDE, SODIUM LACTATE, POTASSIUM CHLORIDE, AND CALCIUM CHLORIDE: .6; .31; .03; .02 INJECTION, SOLUTION INTRAVENOUS at 12:02

## 2023-01-01 RX ADMIN — HYDROMORPHONE HYDROCHLORIDE 0.2 MG: 0.2 INJECTION, SOLUTION INTRAMUSCULAR; INTRAVENOUS; SUBCUTANEOUS at 22:22

## 2023-01-01 RX ADMIN — PIPERACILLIN SODIUM AND TAZOBACTAM SODIUM 3.38 G: 36; 4.5 INJECTION, POWDER, LYOPHILIZED, FOR SOLUTION INTRAVENOUS at 11:06

## 2023-01-01 RX ADMIN — CEFTRIAXONE SODIUM 1000 MG: 10 INJECTION, POWDER, FOR SOLUTION INTRAVENOUS at 01:15

## 2023-01-01 RX ADMIN — Medication 300 MG: at 03:52

## 2023-01-01 RX ADMIN — ACETAMINOPHEN 650 MG: 325 TABLET, FILM COATED ORAL at 17:00

## 2023-01-01 RX ADMIN — FENTANYL CITRATE 25 MCG: 50 INJECTION, SOLUTION INTRAMUSCULAR; INTRAVENOUS at 13:41

## 2023-01-01 RX ADMIN — PIPERACILLIN SODIUM AND TAZOBACTAM SODIUM 3.38 G: 36; 4.5 INJECTION, POWDER, LYOPHILIZED, FOR SOLUTION INTRAVENOUS at 04:00

## 2023-01-01 RX ADMIN — SODIUM CHLORIDE 2 G: 1 TABLET ORAL at 09:22

## 2023-01-01 RX ADMIN — CHLORHEXIDINE GLUCONATE 15 ML: 1.2 SOLUTION ORAL at 10:07

## 2023-01-01 RX ADMIN — PANTOPRAZOLE SODIUM 40 MG: 40 TABLET, DELAYED RELEASE ORAL at 04:39

## 2023-01-01 RX ADMIN — HEPARIN SODIUM 5000 UNITS: 5000 INJECTION INTRAVENOUS; SUBCUTANEOUS at 06:21

## 2023-01-01 RX ADMIN — ENOXAPARIN SODIUM 30 MG: 30 INJECTION SUBCUTANEOUS at 20:50

## 2023-01-01 RX ADMIN — SODIUM BICARBONATE 650 MG TABLET 1300 MG: at 20:46

## 2023-01-01 RX ADMIN — Medication 300 MG: at 03:57

## 2023-01-01 RX ADMIN — MORPHINE SULFATE 2 MG: 2 INJECTION, SOLUTION INTRAMUSCULAR; INTRAVENOUS at 13:30

## 2023-01-01 RX ADMIN — FAMOTIDINE 20 MG: 20 TABLET, FILM COATED ORAL at 08:02

## 2023-01-01 RX ADMIN — METRONIDAZOLE 500 MG: 500 INJECTION, SOLUTION INTRAVENOUS at 09:23

## 2023-01-01 RX ADMIN — PIPERACILLIN SODIUM AND TAZOBACTAM SODIUM 3.38 G: 36; 4.5 INJECTION, POWDER, LYOPHILIZED, FOR SOLUTION INTRAVENOUS at 03:49

## 2023-01-01 RX ADMIN — LOSARTAN POTASSIUM 100 MG: 50 TABLET, FILM COATED ORAL at 08:24

## 2023-01-01 RX ADMIN — PIPERACILLIN SODIUM AND TAZOBACTAM SODIUM 3.38 G: 36; 4.5 INJECTION, POWDER, LYOPHILIZED, FOR SOLUTION INTRAVENOUS at 21:26

## 2023-01-01 RX ADMIN — Medication 300 MG: at 15:31

## 2023-01-01 RX ADMIN — HEPARIN SODIUM 5000 UNITS: 5000 INJECTION INTRAVENOUS; SUBCUTANEOUS at 13:13

## 2023-01-01 RX ADMIN — ACETAMINOPHEN 975 MG: 325 TABLET, FILM COATED ORAL at 00:56

## 2023-01-01 RX ADMIN — ACETAMINOPHEN 650 MG: 325 TABLET, FILM COATED ORAL at 17:20

## 2023-01-01 RX ADMIN — SODIUM CHLORIDE 1 G: 1 TABLET ORAL at 13:31

## 2023-01-01 RX ADMIN — MAGNESIUM SULFATE HEPTAHYDRATE 1 G: 1 INJECTION, SOLUTION INTRAVENOUS at 16:11

## 2023-01-01 RX ADMIN — SODIUM CHLORIDE 3 G: 9 INJECTION, SOLUTION INTRAVENOUS at 13:29

## 2023-01-01 RX ADMIN — SODIUM CHLORIDE 2 G: 1 TABLET ORAL at 17:35

## 2023-01-01 RX ADMIN — FENTANYL CITRATE 50 MCG: 50 INJECTION INTRAMUSCULAR; INTRAVENOUS at 14:56

## 2023-01-01 RX ADMIN — CHLORHEXIDINE GLUCONATE 15 ML: 1.2 SOLUTION ORAL at 08:09

## 2023-01-01 RX ADMIN — SODIUM CHLORIDE 1 G: 1 TABLET ORAL at 08:10

## 2023-01-01 RX ADMIN — ACETAMINOPHEN 650 MG: 325 TABLET, FILM COATED ORAL at 13:29

## 2023-01-01 RX ADMIN — MAGNESIUM SULFATE HEPTAHYDRATE 1 G: 1 INJECTION, SOLUTION INTRAVENOUS at 13:31

## 2023-01-01 RX ADMIN — SODIUM BICARBONATE 650 MG TABLET 1300 MG: at 18:23

## 2023-01-01 RX ADMIN — CHLORHEXIDINE GLUCONATE 15 ML: 1.2 SOLUTION ORAL at 23:52

## 2023-01-01 RX ADMIN — PANTOPRAZOLE SODIUM 40 MG: 40 TABLET, DELAYED RELEASE ORAL at 05:10

## 2023-01-01 RX ADMIN — ACETAMINOPHEN 650 MG: 325 TABLET, FILM COATED ORAL at 13:17

## 2023-01-01 RX ADMIN — HYDROMORPHONE HYDROCHLORIDE 0.2 MG: 0.2 INJECTION, SOLUTION INTRAMUSCULAR; INTRAVENOUS; SUBCUTANEOUS at 09:12

## 2023-01-01 RX ADMIN — CEFTRIAXONE SODIUM 2000 MG: 10 INJECTION, POWDER, FOR SOLUTION INTRAVENOUS at 00:56

## 2023-01-01 RX ADMIN — PIPERACILLIN SODIUM AND TAZOBACTAM SODIUM 3.38 G: 36; 4.5 INJECTION, POWDER, LYOPHILIZED, FOR SOLUTION INTRAVENOUS at 15:57

## 2023-01-01 RX ADMIN — ALBUMIN HUMAN 12.5 G: 0.25 SOLUTION INTRAVENOUS at 13:31

## 2023-01-01 RX ADMIN — FAMOTIDINE 20 MG: 20 TABLET, FILM COATED ORAL at 08:35

## 2023-01-01 RX ADMIN — FUROSEMIDE 80 MG: 10 INJECTION, SOLUTION INTRAMUSCULAR; INTRAVENOUS at 22:16

## 2023-01-01 RX ADMIN — ACETAMINOPHEN 650 MG: 325 TABLET, FILM COATED ORAL at 06:13

## 2023-01-01 RX ADMIN — ACETAMINOPHEN 650 MG: 325 TABLET, FILM COATED ORAL at 00:01

## 2023-01-01 RX ADMIN — HYDROMORPHONE HYDROCHLORIDE 0.2 MG: 0.2 INJECTION, SOLUTION INTRAMUSCULAR; INTRAVENOUS; SUBCUTANEOUS at 23:12

## 2023-01-01 RX ADMIN — SENNOSIDES AND DOCUSATE SODIUM 1 TABLET: 50; 8.6 TABLET ORAL at 22:39

## 2023-01-01 RX ADMIN — SODIUM CHLORIDE, SODIUM GLUCONATE, SODIUM ACETATE, POTASSIUM CHLORIDE, MAGNESIUM CHLORIDE, SODIUM PHOSPHATE, DIBASIC, AND POTASSIUM PHOSPHATE 75 ML/HR: .53; .5; .37; .037; .03; .012; .00082 INJECTION, SOLUTION INTRAVENOUS at 08:03

## 2023-01-01 RX ADMIN — PANTOPRAZOLE SODIUM 40 MG: 40 TABLET, DELAYED RELEASE ORAL at 07:39

## 2023-01-01 RX ADMIN — ACETAMINOPHEN 650 MG: 325 TABLET, FILM COATED ORAL at 05:01

## 2023-01-01 RX ADMIN — PROPOFOL 40 MCG/KG/MIN: 10 INJECTION, EMULSION INTRAVENOUS at 18:00

## 2023-01-01 RX ADMIN — PHENYLEPHRINE HYDROCHLORIDE 70 MCG/MIN: 10 INJECTION INTRAVENOUS at 00:19

## 2023-01-01 RX ADMIN — SODIUM CHLORIDE 1 G: 1 TABLET ORAL at 17:11

## 2023-01-01 RX ADMIN — HEPARIN SODIUM 5000 UNITS: 5000 INJECTION INTRAVENOUS; SUBCUTANEOUS at 06:20

## 2023-01-01 RX ADMIN — HEPARIN SODIUM 5000 UNITS: 5000 INJECTION INTRAVENOUS; SUBCUTANEOUS at 21:17

## 2023-01-01 RX ADMIN — ACETAMINOPHEN 650 MG: 325 TABLET, FILM COATED ORAL at 17:43

## 2023-01-01 RX ADMIN — ACETAMINOPHEN 650 MG: 325 TABLET, FILM COATED ORAL at 23:54

## 2023-01-01 RX ADMIN — OXYCODONE HYDROCHLORIDE 5 MG: 5 TABLET ORAL at 13:17

## 2023-01-01 RX ADMIN — ACETAMINOPHEN 650 MG: 325 TABLET, FILM COATED ORAL at 12:21

## 2023-01-01 RX ADMIN — Medication 2.5 MG: at 13:12

## 2023-01-01 RX ADMIN — PROPOFOL 20 MG: 10 INJECTION, EMULSION INTRAVENOUS at 12:32

## 2023-01-01 RX ADMIN — SUGAMMADEX 120 MG: 100 INJECTION, SOLUTION INTRAVENOUS at 13:43

## 2023-01-01 RX ADMIN — Medication 7.5 MG: at 13:47

## 2023-01-01 RX ADMIN — SODIUM CHLORIDE, SODIUM GLUCONATE, SODIUM ACETATE, POTASSIUM CHLORIDE, MAGNESIUM CHLORIDE, SODIUM PHOSPHATE, DIBASIC, AND POTASSIUM PHOSPHATE 500 ML: .53; .5; .37; .037; .03; .012; .00082 INJECTION, SOLUTION INTRAVENOUS at 01:04

## 2023-01-01 RX ADMIN — HEPARIN SODIUM 5000 UNITS: 5000 INJECTION INTRAVENOUS; SUBCUTANEOUS at 16:33

## 2023-01-01 RX ADMIN — CARBAMIDE PEROXIDE 6.5% 5 DROP: 6.5 LIQUID AURICULAR (OTIC) at 11:45

## 2023-01-01 RX ADMIN — CHLORHEXIDINE GLUCONATE 15 ML: 1.2 SOLUTION ORAL at 09:10

## 2023-01-01 RX ADMIN — FENTANYL CITRATE 50 MCG: 50 INJECTION, SOLUTION INTRAMUSCULAR; INTRAVENOUS at 12:30

## 2023-01-01 RX ADMIN — ACETAMINOPHEN 650 MG: 325 TABLET, FILM COATED ORAL at 04:38

## 2023-01-01 RX ADMIN — POTASSIUM & SODIUM PHOSPHATES POWDER PACK 280-160-250 MG 1 PACKET: 280-160-250 PACK at 10:22

## 2023-01-01 RX ADMIN — HEPARIN SODIUM 5000 UNITS: 5000 INJECTION INTRAVENOUS; SUBCUTANEOUS at 00:00

## 2023-01-01 RX ADMIN — SODIUM CHLORIDE 2 G: 1 TABLET ORAL at 08:35

## 2023-01-01 RX ADMIN — ACETAMINOPHEN 650 MG: 325 TABLET, FILM COATED ORAL at 17:25

## 2023-01-01 RX ADMIN — CEFTRIAXONE SODIUM 1000 MG: 10 INJECTION, POWDER, FOR SOLUTION INTRAVENOUS at 01:08

## 2023-01-01 RX ADMIN — PHENYLEPHRINE HYDROCHLORIDE 100 MCG/MIN: 10 INJECTION INTRAVENOUS at 12:46

## 2023-01-01 RX ADMIN — ALBUTEROL SULFATE 2.5 MG: 2.5 SOLUTION RESPIRATORY (INHALATION) at 20:46

## 2023-01-01 RX ADMIN — FENTANYL CITRATE 50 MCG: 50 INJECTION INTRAMUSCULAR; INTRAVENOUS at 16:19

## 2023-01-01 RX ADMIN — ALBUMIN (HUMAN) 12.5 G: 12.5 INJECTION, SOLUTION INTRAVENOUS at 14:47

## 2023-01-01 RX ADMIN — CARBAMIDE PEROXIDE 6.5% 5 DROP: 6.5 LIQUID AURICULAR (OTIC) at 18:00

## 2023-01-01 RX ADMIN — HEPARIN SODIUM 5000 UNITS: 5000 INJECTION INTRAVENOUS; SUBCUTANEOUS at 22:39

## 2023-01-01 RX ADMIN — SODIUM CHLORIDE 1000 ML: 0.9 INJECTION, SOLUTION INTRAVENOUS at 01:06

## 2023-01-01 RX ADMIN — FENTANYL CITRATE 25 MCG: 50 INJECTION, SOLUTION INTRAMUSCULAR; INTRAVENOUS at 12:20

## 2023-01-01 RX ADMIN — HEPARIN SODIUM 5000 UNITS: 5000 INJECTION INTRAVENOUS; SUBCUTANEOUS at 06:12

## 2023-01-01 RX ADMIN — SODIUM CHLORIDE 1 G: 1 TABLET ORAL at 17:35

## 2023-01-01 RX ADMIN — PIPERACILLIN SODIUM AND TAZOBACTAM SODIUM 3.38 G: 36; 4.5 INJECTION, POWDER, LYOPHILIZED, FOR SOLUTION INTRAVENOUS at 03:52

## 2023-01-01 RX ADMIN — DEXAMETHASONE SODIUM PHOSPHATE 4 MG: 10 INJECTION, SOLUTION INTRAMUSCULAR; INTRAVENOUS at 12:36

## 2023-01-01 RX ADMIN — Medication 300 MG: at 14:14

## 2023-01-01 RX ADMIN — PANTOPRAZOLE SODIUM 40 MG: 40 INJECTION, POWDER, FOR SOLUTION INTRAVENOUS at 08:12

## 2023-01-01 RX ADMIN — CEFTRIAXONE SODIUM 1000 MG: 10 INJECTION, POWDER, FOR SOLUTION INTRAVENOUS at 11:07

## 2023-01-01 RX ADMIN — ACETAMINOPHEN 650 MG: 325 TABLET, FILM COATED ORAL at 01:08

## 2023-01-01 RX ADMIN — POLYETHYLENE GLYCOL 3350 17 G: 17 POWDER, FOR SOLUTION ORAL at 09:20

## 2023-01-01 RX ADMIN — Medication 3 MG: at 21:27

## 2023-01-01 RX ADMIN — MORPHINE SULFATE 2 MG: 2 INJECTION, SOLUTION INTRAMUSCULAR; INTRAVENOUS at 04:42

## 2023-01-01 RX ADMIN — HYDROMORPHONE HYDROCHLORIDE 0.2 MG: 0.2 INJECTION, SOLUTION INTRAMUSCULAR; INTRAVENOUS; SUBCUTANEOUS at 14:32

## 2023-01-01 RX ADMIN — HEPARIN SODIUM 5000 UNITS: 5000 INJECTION INTRAVENOUS; SUBCUTANEOUS at 13:22

## 2023-01-01 RX ADMIN — ACETAMINOPHEN 650 MG: 325 TABLET, FILM COATED ORAL at 12:22

## 2023-01-01 RX ADMIN — Medication 2.5 MG: at 08:05

## 2023-01-01 RX ADMIN — CARBAMIDE PEROXIDE 6.5% 5 DROP: 6.5 LIQUID AURICULAR (OTIC) at 08:03

## 2023-01-01 RX ADMIN — ACETAMINOPHEN 650 MG: 325 TABLET, FILM COATED ORAL at 23:37

## 2023-01-01 RX ADMIN — HEPARIN SODIUM 5000 UNITS: 5000 INJECTION INTRAVENOUS; SUBCUTANEOUS at 14:09

## 2023-01-01 RX ADMIN — Medication 300 MG: at 16:34

## 2023-01-01 RX ADMIN — MAGNESIUM SULFATE HEPTAHYDRATE 1 G: 1 INJECTION, SOLUTION INTRAVENOUS at 14:28

## 2023-01-01 RX ADMIN — PIPERACILLIN SODIUM AND TAZOBACTAM SODIUM 3.38 G: 36; 4.5 INJECTION, POWDER, LYOPHILIZED, FOR SOLUTION INTRAVENOUS at 22:12

## 2023-01-01 RX ADMIN — HEPARIN SODIUM 5000 UNITS: 5000 INJECTION INTRAVENOUS; SUBCUTANEOUS at 13:10

## 2023-01-01 RX ADMIN — Medication 3 MG: at 22:17

## 2023-01-01 RX ADMIN — POTASSIUM PHOSPHATE, MONOBASIC AND POTASSIUM PHOSPHATE, DIBASIC 21 MMOL: 224; 236 INJECTION, SOLUTION, CONCENTRATE INTRAVENOUS at 12:15

## 2023-01-01 RX ADMIN — CARBAMIDE PEROXIDE 6.5% 5 DROP: 6.5 LIQUID AURICULAR (OTIC) at 18:16

## 2023-01-01 RX ADMIN — Medication 300 MG: at 03:17

## 2023-01-01 RX ADMIN — SODIUM CHLORIDE 3 G: 9 INJECTION, SOLUTION INTRAVENOUS at 20:47

## 2023-01-01 RX ADMIN — PIPERACILLIN SODIUM AND TAZOBACTAM SODIUM 3.38 G: 36; 4.5 INJECTION, POWDER, LYOPHILIZED, FOR SOLUTION INTRAVENOUS at 20:45

## 2023-01-01 RX ADMIN — PIPERACILLIN SODIUM AND TAZOBACTAM SODIUM 3.38 G: 36; 4.5 INJECTION, POWDER, LYOPHILIZED, FOR SOLUTION INTRAVENOUS at 13:58

## 2023-01-01 RX ADMIN — HEPARIN SODIUM 5000 UNITS: 5000 INJECTION INTRAVENOUS; SUBCUTANEOUS at 21:43

## 2023-01-01 RX ADMIN — PANTOPRAZOLE SODIUM 40 MG: 40 TABLET, DELAYED RELEASE ORAL at 06:20

## 2023-01-01 RX ADMIN — CHLORHEXIDINE GLUCONATE 15 ML: 1.2 SOLUTION ORAL at 22:00

## 2023-01-01 RX ADMIN — ACETAMINOPHEN 650 MG: 325 TABLET, FILM COATED ORAL at 06:20

## 2023-01-01 RX ADMIN — HEPARIN SODIUM 5000 UNITS: 5000 INJECTION INTRAVENOUS; SUBCUTANEOUS at 06:32

## 2023-01-01 RX ADMIN — MORPHINE SULFATE 2 MG: 2 INJECTION, SOLUTION INTRAMUSCULAR; INTRAVENOUS at 09:16

## 2023-01-01 RX ADMIN — HEPARIN SODIUM 5000 UNITS: 5000 INJECTION INTRAVENOUS; SUBCUTANEOUS at 13:47

## 2023-01-01 RX ADMIN — CARBIDOPA AND LEVODOPA 2.5 MG: 50; 200 TABLET, EXTENDED RELEASE ORAL at 15:57

## 2023-01-01 RX ADMIN — CARBAMIDE PEROXIDE 6.5% 5 DROP: 6.5 LIQUID AURICULAR (OTIC) at 17:25

## 2023-01-01 RX ADMIN — HEPARIN SODIUM 5000 UNITS: 5000 INJECTION INTRAVENOUS; SUBCUTANEOUS at 07:39

## 2023-01-01 RX ADMIN — CARBIDOPA AND LEVODOPA 2.5 MG: 50; 200 TABLET, EXTENDED RELEASE ORAL at 06:21

## 2023-01-01 RX ADMIN — CARBAMIDE PEROXIDE 6.5% 5 DROP: 6.5 LIQUID AURICULAR (OTIC) at 18:23

## 2023-01-01 RX ADMIN — SODIUM CHLORIDE 2 G: 1 TABLET ORAL at 17:43

## 2023-01-01 RX ADMIN — ALBUMIN HUMAN 12.5 G: 0.25 SOLUTION INTRAVENOUS at 16:01

## 2023-01-01 RX ADMIN — SODIUM CHLORIDE, SODIUM LACTATE, POTASSIUM CHLORIDE, AND CALCIUM CHLORIDE 500 ML: .6; .31; .03; .02 INJECTION, SOLUTION INTRAVENOUS at 16:38

## 2023-01-01 RX ADMIN — Medication 75 MCG/HR: at 22:26

## 2023-01-01 RX ADMIN — HEPARIN SODIUM 5000 UNITS: 5000 INJECTION INTRAVENOUS; SUBCUTANEOUS at 05:24

## 2023-01-01 RX ADMIN — FAMOTIDINE 20 MG: 20 TABLET, FILM COATED ORAL at 12:01

## 2023-01-01 RX ADMIN — ENOXAPARIN SODIUM 30 MG: 30 INJECTION SUBCUTANEOUS at 21:28

## 2023-01-01 RX ADMIN — ROCURONIUM BROMIDE 70 MG: 10 INJECTION, SOLUTION INTRAVENOUS at 12:34

## 2023-01-01 RX ADMIN — HEPARIN SODIUM 5000 UNITS: 5000 INJECTION INTRAVENOUS; SUBCUTANEOUS at 04:39

## 2023-01-01 RX ADMIN — CARBAMIDE PEROXIDE 6.5% 5 DROP: 6.5 LIQUID AURICULAR (OTIC) at 18:12

## 2023-01-01 RX ADMIN — SODIUM BICARBONATE 650 MG TABLET 1300 MG: at 08:45

## 2023-01-01 RX ADMIN — SODIUM BICARBONATE 650 MG TABLET 1300 MG: at 18:06

## 2023-01-01 RX ADMIN — HEPARIN SODIUM 5000 UNITS: 5000 INJECTION INTRAVENOUS; SUBCUTANEOUS at 21:31

## 2023-01-01 RX ADMIN — ACETAMINOPHEN 650 MG: 325 TABLET, FILM COATED ORAL at 17:35

## 2023-01-01 RX ADMIN — FENTANYL CITRATE 100 MCG: 50 INJECTION, SOLUTION INTRAMUSCULAR; INTRAVENOUS at 13:56

## 2023-01-01 RX ADMIN — CARBAMIDE PEROXIDE 6.5% 5 DROP: 6.5 LIQUID AURICULAR (OTIC) at 10:08

## 2023-01-01 RX ADMIN — HEPARIN SODIUM 5000 UNITS: 5000 INJECTION INTRAVENOUS; SUBCUTANEOUS at 06:01

## 2023-01-01 RX ADMIN — HEPARIN SODIUM 5000 UNITS: 5000 INJECTION INTRAVENOUS; SUBCUTANEOUS at 13:31

## 2023-01-01 RX ADMIN — FUROSEMIDE 40 MG: 10 INJECTION, SOLUTION INTRAMUSCULAR; INTRAVENOUS at 12:10

## 2023-01-01 RX ADMIN — FUROSEMIDE 40 MG: 10 INJECTION, SOLUTION INTRAMUSCULAR; INTRAVENOUS at 15:37

## 2023-01-01 RX ADMIN — SENNOSIDES AND DOCUSATE SODIUM 1 TABLET: 50; 8.6 TABLET ORAL at 21:43

## 2023-01-01 RX ADMIN — CHLORHEXIDINE GLUCONATE 15 ML: 1.2 SOLUTION ORAL at 21:17

## 2023-01-01 RX ADMIN — MORPHINE SULFATE 2 MG: 2 INJECTION, SOLUTION INTRAMUSCULAR; INTRAVENOUS at 16:19

## 2023-01-01 RX ADMIN — SODIUM CHLORIDE 500 ML: 0.9 INJECTION, SOLUTION INTRAVENOUS at 10:05

## 2023-01-01 RX ADMIN — FENTANYL CITRATE 50 MCG: 50 INJECTION INTRAMUSCULAR; INTRAVENOUS at 10:14

## 2023-01-01 RX ADMIN — PHENYLEPHRINE HYDROCHLORIDE 120 MCG/MIN: 10 INJECTION INTRAVENOUS at 15:05

## 2023-01-01 RX ADMIN — SODIUM CHLORIDE 1 G: 1 TABLET ORAL at 17:00

## 2023-01-01 RX ADMIN — SODIUM CHLORIDE 1 G: 1 TABLET ORAL at 12:33

## 2023-01-01 RX ADMIN — CARBAMIDE PEROXIDE 6.5% 5 DROP: 6.5 LIQUID AURICULAR (OTIC) at 08:12

## 2023-01-01 RX ADMIN — ACETAMINOPHEN 650 MG: 325 TABLET, FILM COATED ORAL at 07:39

## 2023-01-01 RX ADMIN — CARBIDOPA AND LEVODOPA 2.5 MG: 50; 200 TABLET, EXTENDED RELEASE ORAL at 08:02

## 2023-01-01 RX ADMIN — VANCOMYCIN HYDROCHLORIDE 1000 MG: 1 INJECTION, SOLUTION INTRAVENOUS at 12:43

## 2023-01-01 RX ADMIN — HEPARIN SODIUM 5000 UNITS: 5000 INJECTION INTRAVENOUS; SUBCUTANEOUS at 21:56

## 2023-01-01 RX ADMIN — PIPERACILLIN SODIUM AND TAZOBACTAM SODIUM 3.38 G: 36; 4.5 INJECTION, POWDER, LYOPHILIZED, FOR SOLUTION INTRAVENOUS at 15:13

## 2023-01-01 RX ADMIN — CARBAMIDE PEROXIDE 6.5% 5 DROP: 6.5 LIQUID AURICULAR (OTIC) at 08:09

## 2023-01-01 RX ADMIN — PHENYLEPHRINE HYDROCHLORIDE 50 MCG/MIN: 10 INJECTION INTRAVENOUS at 10:31

## 2023-01-01 RX ADMIN — HEPARIN SODIUM 5000 UNITS: 5000 INJECTION INTRAVENOUS; SUBCUTANEOUS at 23:00

## 2023-01-01 RX ADMIN — CHLORHEXIDINE GLUCONATE 15 ML: 1.2 SOLUTION ORAL at 08:12

## 2023-01-01 RX ADMIN — HEPARIN SODIUM 5000 UNITS: 5000 INJECTION INTRAVENOUS; SUBCUTANEOUS at 21:19

## 2023-01-01 RX ADMIN — SODIUM BICARBONATE 650 MG TABLET 1300 MG: at 18:12

## 2023-01-01 RX ADMIN — PROPOFOL 20 MCG/KG/MIN: 10 INJECTION, EMULSION INTRAVENOUS at 15:02

## 2023-01-01 RX ADMIN — HEPARIN SODIUM 5000 UNITS: 5000 INJECTION INTRAVENOUS; SUBCUTANEOUS at 15:57

## 2023-01-01 RX ADMIN — SENNOSIDES AND DOCUSATE SODIUM 1 TABLET: 50; 8.6 TABLET ORAL at 21:56

## 2023-01-01 RX ADMIN — LIDOCAINE HYDROCHLORIDE 10 ML: 10 INJECTION, SOLUTION EPIDURAL; INFILTRATION; INTRACAUDAL; PERINEURAL at 17:19

## 2023-01-01 RX ADMIN — HEPARIN SODIUM 5000 UNITS: 5000 INJECTION INTRAVENOUS; SUBCUTANEOUS at 14:25

## 2023-01-01 RX ADMIN — PROPOFOL 20 MCG/KG/MIN: 10 INJECTION, EMULSION INTRAVENOUS at 00:19

## 2023-01-01 RX ADMIN — PROPOFOL 20 MG: 10 INJECTION, EMULSION INTRAVENOUS at 12:31

## 2023-01-01 RX ADMIN — HEPARIN SODIUM 5000 UNITS: 5000 INJECTION INTRAVENOUS; SUBCUTANEOUS at 05:02

## 2023-01-01 RX ADMIN — FENTANYL CITRATE 50 MCG: 50 INJECTION INTRAMUSCULAR; INTRAVENOUS at 10:16

## 2023-01-01 RX ADMIN — CALCIUM GLUCONATE 2 G: 20 INJECTION, SOLUTION INTRAVENOUS at 23:57

## 2023-01-01 RX ADMIN — FAMOTIDINE 20 MG: 20 TABLET, FILM COATED ORAL at 09:22

## 2023-01-01 RX ADMIN — ACETAMINOPHEN 650 MG: 325 TABLET, FILM COATED ORAL at 12:13

## 2023-01-01 RX ADMIN — FUROSEMIDE 40 MG: 10 INJECTION, SOLUTION INTRAMUSCULAR; INTRAVENOUS at 22:30

## 2023-01-01 RX ADMIN — SODIUM CHLORIDE 2 G: 1 TABLET ORAL at 12:21

## 2023-01-01 RX ADMIN — CARBAMIDE PEROXIDE 6.5% 5 DROP: 6.5 LIQUID AURICULAR (OTIC) at 17:24

## 2023-01-01 RX ADMIN — CHLORHEXIDINE GLUCONATE 15 ML: 1.2 SOLUTION ORAL at 21:29

## 2023-01-01 RX ADMIN — OXYCODONE HYDROCHLORIDE 5 MG: 5 TABLET ORAL at 20:00

## 2023-01-01 RX ADMIN — PROPOFOL 20 MG: 10 INJECTION, EMULSION INTRAVENOUS at 12:33

## 2023-01-01 RX ADMIN — ACETAMINOPHEN 650 MG: 325 TABLET, FILM COATED ORAL at 05:56

## 2023-01-01 RX ADMIN — ACETAMINOPHEN 650 MG: 325 TABLET, FILM COATED ORAL at 12:01

## 2023-01-01 RX ADMIN — HEPARIN SODIUM 5000 UNITS: 5000 INJECTION INTRAVENOUS; SUBCUTANEOUS at 22:37

## 2023-01-01 RX ADMIN — PANTOPRAZOLE SODIUM 40 MG: 40 INJECTION, POWDER, FOR SOLUTION INTRAVENOUS at 09:54

## 2023-01-01 RX ADMIN — ACETAMINOPHEN 650 MG: 325 TABLET, FILM COATED ORAL at 12:32

## 2023-01-01 RX ADMIN — SODIUM CHLORIDE 1 G: 1 TABLET ORAL at 09:19

## 2023-01-01 RX ADMIN — HEPARIN SODIUM 5000 UNITS: 5000 INJECTION INTRAVENOUS; SUBCUTANEOUS at 05:56

## 2023-01-01 RX ADMIN — HEPARIN SODIUM 5000 UNITS: 5000 INJECTION INTRAVENOUS; SUBCUTANEOUS at 05:17

## 2023-01-01 RX ADMIN — Medication 300 MG: at 15:20

## 2023-01-01 RX ADMIN — LIDOCAINE HYDROCHLORIDE 50 MG: 10 INJECTION, SOLUTION EPIDURAL; INFILTRATION; INTRACAUDAL; PERINEURAL at 12:31

## 2023-01-01 RX ADMIN — CARBIDOPA AND LEVODOPA 2.5 MG: 50; 200 TABLET, EXTENDED RELEASE ORAL at 12:13

## 2023-01-01 RX ADMIN — ENOXAPARIN SODIUM 30 MG: 30 INJECTION SUBCUTANEOUS at 08:45

## 2023-01-01 RX ADMIN — Medication 50 MCG/HR: at 15:01

## 2023-01-01 RX ADMIN — SODIUM CHLORIDE 1 G: 1 TABLET ORAL at 12:01

## 2023-01-01 RX ADMIN — CALCIUM GLUCONATE 2 G: 20 INJECTION, SOLUTION INTRAVENOUS at 01:13

## 2023-01-01 RX ADMIN — PIPERACILLIN SODIUM AND TAZOBACTAM SODIUM 3.38 G: 36; 4.5 INJECTION, POWDER, LYOPHILIZED, FOR SOLUTION INTRAVENOUS at 04:26

## 2023-01-01 RX ADMIN — Medication 75 MCG/HR: at 12:01

## 2023-01-01 RX ADMIN — PANTOPRAZOLE SODIUM 40 MG: 40 INJECTION, POWDER, FOR SOLUTION INTRAVENOUS at 15:40

## 2023-01-01 RX ADMIN — CALCIUM GLUCONATE 2 G: 20 INJECTION, SOLUTION INTRAVENOUS at 07:06

## 2023-01-01 RX ADMIN — PIPERACILLIN SODIUM AND TAZOBACTAM SODIUM 3.38 G: 36; 4.5 INJECTION, POWDER, LYOPHILIZED, FOR SOLUTION INTRAVENOUS at 11:44

## 2023-01-01 RX ADMIN — CHLORHEXIDINE GLUCONATE 15 ML: 1.2 SOLUTION ORAL at 20:22

## 2023-01-01 RX ADMIN — PIPERACILLIN SODIUM AND TAZOBACTAM SODIUM 3.38 G: 36; 4.5 INJECTION, POWDER, LYOPHILIZED, FOR SOLUTION INTRAVENOUS at 16:01

## 2023-01-01 RX ADMIN — IOHEXOL 100 ML: 350 INJECTION, SOLUTION INTRAVENOUS at 09:47

## 2023-01-01 RX ADMIN — POLYETHYLENE GLYCOL 3350 17 G: 17 POWDER, FOR SOLUTION ORAL at 08:10

## 2023-01-01 RX ADMIN — CALCIUM GLUCONATE 2 G: 20 INJECTION, SOLUTION INTRAVENOUS at 16:31

## 2023-01-01 RX ADMIN — SODIUM CHLORIDE 3 G: 1 TABLET ORAL at 22:30

## 2023-01-01 RX ADMIN — PIPERACILLIN SODIUM AND TAZOBACTAM SODIUM 3.38 G: 36; 4.5 INJECTION, POWDER, LYOPHILIZED, FOR SOLUTION INTRAVENOUS at 04:34

## 2023-01-01 RX ADMIN — HEPARIN SODIUM 5000 UNITS: 5000 INJECTION INTRAVENOUS; SUBCUTANEOUS at 13:48

## 2023-01-01 RX ADMIN — CALCIUM GLUCONATE 2 G: 20 INJECTION, SOLUTION INTRAVENOUS at 10:21

## 2023-01-01 RX ADMIN — PIPERACILLIN SODIUM AND TAZOBACTAM SODIUM 3.38 G: 36; 4.5 INJECTION, POWDER, LYOPHILIZED, FOR SOLUTION INTRAVENOUS at 03:10

## 2023-01-01 RX ADMIN — ACETAMINOPHEN 650 MG: 325 TABLET, FILM COATED ORAL at 00:07

## 2023-01-01 RX ADMIN — Medication 75 MCG/HR: at 01:43

## 2023-01-01 RX ADMIN — OXYCODONE HYDROCHLORIDE 5 MG: 5 TABLET ORAL at 04:36

## 2023-01-01 RX ADMIN — IOHEXOL 85 ML: 350 INJECTION, SOLUTION INTRAVENOUS at 21:33

## 2023-01-01 RX ADMIN — POTASSIUM PHOSPHATE, MONOBASIC AND POTASSIUM PHOSPHATE, DIBASIC 21 MMOL: 224; 236 INJECTION, SOLUTION, CONCENTRATE INTRAVENOUS at 13:03

## 2023-01-01 RX ADMIN — CARBAMIDE PEROXIDE 6.5% 5 DROP: 6.5 LIQUID AURICULAR (OTIC) at 09:23

## 2023-01-03 ENCOUNTER — OFFICE VISIT (OUTPATIENT)
Dept: PHYSICAL THERAPY | Facility: CLINIC | Age: 88
End: 2023-01-03

## 2023-01-03 DIAGNOSIS — S42.201D CLOSED FRACTURE OF PROXIMAL END OF RIGHT HUMERUS WITH ROUTINE HEALING, UNSPECIFIED FRACTURE MORPHOLOGY, SUBSEQUENT ENCOUNTER: Primary | ICD-10-CM

## 2023-01-03 NOTE — PROGRESS NOTES
Daily Note     Today's date: 1/3/2023  Patient name: Faiza Park  : 3/12/1928  MRN: [id number]  Referring provider: Arlene Tomas MD  Dx:   Encounter Diagnosis     ICD-10-CM    1  Closed fracture of proximal end of right humerus with routine healing, unspecified fracture morphology, subsequent encounter  S42 201D                      Subjective: Pt states that she has been working on her exercises consistently at home  She tries to use her R arm but it is painful and difficulty      Objective: See treatment diary below      Assessment: Pt progressing well with PROM, AROM remains challenging secondary to pain and neuromuscular activation deficit  Pt will benefit from continued skilled PT to maximize function  Plan: Continue per plan of care  Precautions: None      Manuals 12/29 1/3           Shoulder PROM DL DL                                                  Neuro Re-Ed                                                                                                        Ther Ex             Table slides Flex/abd 6x30" ea   HEP 10x10" ea           Scapular retraction HEP            Pulley  10x10"           Supine AAROM shoulder  Flex/ER with cane Flex w/ cane 10x           Supine chest press  White cane 10x AROM                                                  Ther Activity                                       Gait Training                                       Modalities

## 2023-01-05 ENCOUNTER — APPOINTMENT (OUTPATIENT)
Dept: PHYSICAL THERAPY | Facility: CLINIC | Age: 88
End: 2023-01-05

## 2023-01-10 ENCOUNTER — OFFICE VISIT (OUTPATIENT)
Dept: PHYSICAL THERAPY | Facility: CLINIC | Age: 88
End: 2023-01-10

## 2023-01-10 DIAGNOSIS — S42.201D CLOSED FRACTURE OF PROXIMAL END OF RIGHT HUMERUS WITH ROUTINE HEALING, UNSPECIFIED FRACTURE MORPHOLOGY, SUBSEQUENT ENCOUNTER: Primary | ICD-10-CM

## 2023-01-10 NOTE — PROGRESS NOTES
Daily Note     Today's date: 1/10/2023  Patient name: Garcia Rater  : 3/12/1928  MRN: [id number]  Referring provider: Michelle Coley MD  Dx:   Encounter Diagnosis     ICD-10-CM    1  Closed fracture of proximal end of right humerus with routine healing, unspecified fracture morphology, subsequent encounter  S42 201D                      Subjective: Pt reports she is feeling better  Notes she is using her R arm to brush her teeth, and feels her handwriting has improved  Objective: See treatment diary below    Assessment: Performed exercise program w/o complaint  Able to jennifer gentle PROM to R shoulder, mild discomfort at times  Will monitor  Cont therapy to decrease discomfort and increase ROM  Plan: Cont per POC  Precautions: None      Manuals 12/29 1/3 1/10          Shoulder PROM DL DL MO                                                 Neuro Re-Ed                                                                                                        Ther Ex             Table slides Flex/abd 6x30" ea   HEP 10x10" ea 10x10" ea          Scapular retraction HEP            Pulley  10x10" 10x10"          Supine AAROM shoulder  Flex/ER with cane Flex w/ cane 10x Flex  w/cane 10x          Supine chest press  White cane 10x AROM yllw cane  10x                                                 Ther Activity                                       Gait Training                                       Modalities

## 2023-01-12 ENCOUNTER — OFFICE VISIT (OUTPATIENT)
Dept: PHYSICAL THERAPY | Facility: CLINIC | Age: 88
End: 2023-01-12

## 2023-01-12 DIAGNOSIS — S42.201D CLOSED FRACTURE OF PROXIMAL END OF RIGHT HUMERUS WITH ROUTINE HEALING, UNSPECIFIED FRACTURE MORPHOLOGY, SUBSEQUENT ENCOUNTER: Primary | ICD-10-CM

## 2023-01-12 NOTE — PROGRESS NOTES
Daily Note     Today's date: 1/10/2023  Patient name: Ernst Morales  : 3/12/1928  MRN: [id number]  Referring provider: Nigel Salazar MD  Dx:   Encounter Diagnosis     ICD-10-CM    1  Closed fracture of proximal end of right humerus with routine healing, unspecified fracture morphology, subsequent encounter  S42 201D                      Subjective: Pt cont to report she is feeling better  Notes she is using her R arm to pull her bed covers up and her jeans  Pt has been using her R hand more often to eat  Objective: See treatment diary below    Assessment: Performed exercise program w/mild discomfort at times  Able to jennifer gentle PROM to R shoulder  Will monitor  Cont therapy to decrease discomfort and increase ROM strength  Plan: Cont per POC  Precautions: None      Manuals 12/29 1/3 1/10 1/12         Shoulder PROM DL DL MO MO                                                Neuro Re-Ed                                                                                                        Ther Ex             Table slides Flex/abd 6x30" ea   HEP 10x10" ea 10x10" ea 10x10" ea         Scapular retraction HEP            Pulley  10x10" 10x10" 10x10"         Supine AAROM shoulder  Flex/ER with cane Flex w/ cane 10x Flex  w/cane 10x Flex w/cane 10x         Supine chest press  White cane 10x AROM yllw cane  10x yllw cane  10x                                                Ther Activity                                       Gait Training                                       Modalities

## 2023-01-17 ENCOUNTER — OFFICE VISIT (OUTPATIENT)
Dept: PHYSICAL THERAPY | Facility: CLINIC | Age: 88
End: 2023-01-17

## 2023-01-17 DIAGNOSIS — S42.201D CLOSED FRACTURE OF PROXIMAL END OF RIGHT HUMERUS WITH ROUTINE HEALING, UNSPECIFIED FRACTURE MORPHOLOGY, SUBSEQUENT ENCOUNTER: Primary | ICD-10-CM

## 2023-01-17 NOTE — PROGRESS NOTES
Daily Note     Today's date: 1/10/2023  Patient name: Kranthi Langston  : 3/12/1928  MRN: [id number]  Referring provider: Nany Bland MD  Dx:   Encounter Diagnosis     ICD-10-CM    1  Closed fracture of proximal end of right humerus with routine healing, unspecified fracture morphology, subsequent encounter  S42 201D                      Subjective: Pt cont to note improvement R shoulder/upper arm w/ADL's  Objective: See treatment diary below    Assessment: Performed exercise program w/o complaint  Able to jennifer gentle PROM to R shoulder, flex and ABD improved w/repetitions    Will monitor  Cont therapy to decrease discomfort and increase ROM strength, and LOF  Frank Gabriella Plan: Cont per POC  Precautions: None      Manuals 12/29 1/3 1/10 1/12 1/17        Shoulder PROM DL DL MO MO MO                                               Neuro Re-Ed                                                                                                        Ther Ex             Table slides Flex/abd 6x30" ea   HEP 10x10" ea 10x10" ea 10x10" ea 10x10" ea        Scapular retraction HEP            Pulley  10x10" 10x10" 10x10" 10x10"        Supine AAROM shoulder  Flex/ER with cane Flex w/ cane 10x Flex  w/cane 10x Flex w/cane 10x Flex w/cane 5"x10        Supine chest press  White cane 10x AROM yllw cane  10x yllw cane  10x yllw  Cane  5"x10                                               Ther Activity                                       Gait Training                                       Modalities

## 2023-01-19 ENCOUNTER — APPOINTMENT (OUTPATIENT)
Dept: PHYSICAL THERAPY | Facility: CLINIC | Age: 88
End: 2023-01-19

## 2023-01-23 NOTE — PROGRESS NOTES
Pt called to cancel rest of scheduled appointments and requests to be discharged  Pt to be discharged from skilled PT

## 2023-01-24 ENCOUNTER — APPOINTMENT (OUTPATIENT)
Dept: PHYSICAL THERAPY | Facility: CLINIC | Age: 88
End: 2023-01-24

## 2023-01-26 ENCOUNTER — APPOINTMENT (OUTPATIENT)
Dept: PHYSICAL THERAPY | Facility: CLINIC | Age: 88
End: 2023-01-26

## 2023-02-02 ENCOUNTER — APPOINTMENT (OUTPATIENT)
Dept: RADIOLOGY | Facility: CLINIC | Age: 88
End: 2023-02-02

## 2023-02-02 ENCOUNTER — OFFICE VISIT (OUTPATIENT)
Dept: OBGYN CLINIC | Facility: CLINIC | Age: 88
End: 2023-02-02

## 2023-02-02 VITALS
WEIGHT: 125 LBS | HEIGHT: 60 IN | DIASTOLIC BLOOD PRESSURE: 72 MMHG | SYSTOLIC BLOOD PRESSURE: 128 MMHG | BODY MASS INDEX: 24.54 KG/M2

## 2023-02-02 DIAGNOSIS — S42.201D CLOSED FRACTURE OF PROXIMAL END OF RIGHT HUMERUS WITH ROUTINE HEALING, UNSPECIFIED FRACTURE MORPHOLOGY, SUBSEQUENT ENCOUNTER: Primary | ICD-10-CM

## 2023-02-02 DIAGNOSIS — S42.201D CLOSED FRACTURE OF PROXIMAL END OF RIGHT HUMERUS WITH ROUTINE HEALING, UNSPECIFIED FRACTURE MORPHOLOGY, SUBSEQUENT ENCOUNTER: ICD-10-CM

## 2023-02-02 NOTE — PROGRESS NOTES
Assessment:     1  Closed fracture of proximal end of right humerus with routine healing, unspecified fracture morphology, subsequent encounter        Plan:     Problem List Items Addressed This Visit        Musculoskeletal and Integument    Closed fracture of right proximal humerus - Primary     Findings consistent with continued healing comminuted proximal humerus fracture 11/7/22  Imaging reviewed with patient  Patient can continue with physical therapy and transition to [facility] when ready  Continue with daily stretching exercises  She can use right arm to tolerance  See patient back on as needed basis  All patient's questions were answered to her satisfaction  This note is created using dictation transcription  It may contain typographical errors, grammatical errors, improperly dictated words, background noise and other errors  Relevant Orders    XR shoulder 2+ vw right       Subjective:     Patient ID: Frank Engle is a 80 y o  female  Chief Complaint:  80year old female presents today follow up for  right proximal humerus fracture, DOI 11/7/22, right wrist arthritis  She has been attending physical therapy with benefit  She d/c sling  She states she has no pain  She is able to wash her hair  She is using arm to tolerance  No complaints at this time  Allergy:  No Known Allergies  Medications:  all current active meds have been reviewed  Past Medical History:  Past Medical History:   Diagnosis Date   • Cancer ([facility] [address] )     Cervical, Kidney, melanoma Per MRI Screening form 2/5/19     Past Surgical History:  History reviewed  No pertinent surgical history  Family History:  History reviewed  No pertinent family history    Social History:  Social History     Substance and Sexual Activity   Alcohol Use None     Social History     Substance and Sexual Activity   Drug Use Not Currently     Social History     Tobacco Use   Smoking Status Never   Smokeless Tobacco Never     Review of Systems Constitutional: Negative for chills and fever  HENT: Negative for ear pain and sore throat  Eyes: Negative for pain and visual disturbance  Respiratory: Negative for cough and shortness of breath  Cardiovascular: Negative for chest pain and palpitations  Gastrointestinal: Negative for abdominal pain and vomiting  Genitourinary: Negative for dysuria and hematuria  Musculoskeletal: Negative for arthralgias and back pain  Skin: Negative for color change and rash  Neurological: Negative for seizures and syncope  All other systems reviewed and are negative  Objective:  BP Readings from Last 1 Encounters:   02/02/23 128/72      Wt Readings from Last 1 Encounters:   02/02/23 56 7 kg (125 lb)      BMI:   Estimated body mass index is 24 41 kg/m² as calculated from the following:    Height as of this encounter: 5' (1 524 m)  Weight as of this encounter: 56 7 kg (125 lb)  BSA:   Estimated body surface area is 1 53 meters squared as calculated from the following:    Height as of this encounter: 5' (1 524 m)  Weight as of this encounter: 56 7 kg (125 lb)  Physical Exam  Constitutional:       Appearance: She is well-developed  Eyes:      Pupils: Pupils are equal, round, and reactive to light  Pulmonary:      Effort: Pulmonary effort is normal       Breath sounds: Normal breath sounds  Musculoskeletal:         General: Tenderness (right shoulder arthralgia ) present  Skin:     General: Skin is warm and dry  Neurological:      Mental Status: She is alert and oriented to person, place, and time  Deep Tendon Reflexes: Reflexes are normal and symmetric  Psychiatric:         Behavior: Behavior normal          Thought Content: Thought content normal          Judgment: Judgment normal        Right Shoulder Exam     Tenderness   The patient is experiencing no tenderness      Range of Motion   Active abduction: abnormal   Passive abduction: abnormal   Forward flexion: 100     Muscle Strength   Abduction: 4/5   Internal rotation: 4/5   External rotation: 4/5     Tests   Cross arm: negative  Drop arm: negative    Other   Erythema: absent  Scars: absent  Sensation: normal  Pulse: present            I have personally reviewed pertinent films in PACS and my interpretation is xr right shoulder demonstrates continued healing of comminuted proximal humerus fracture which maintains acceptable alignment and position with healing callus        Scribe Attestation    I,:  Leonardo Clark am acting as a scribe while in the presence of the attending physician :       I,:  Filomena Arndt MD personally performed the services described in this documentation    as scribed in my presence :

## 2023-02-02 NOTE — ASSESSMENT & PLAN NOTE
Findings consistent with continued healing comminuted proximal humerus fracture 11/7/22  Imaging reviewed with patient  Patient can continue with physical therapy and transition to [facility] when ready  Continue with daily stretching exercises  She can use right arm to tolerance  See patient back on as needed basis  All patient's questions were answered to her satisfaction  This note is created using dictation transcription  It may contain typographical errors, grammatical errors, improperly dictated words, background noise and other errors

## 2023-06-28 ENCOUNTER — APPOINTMENT (OUTPATIENT)
Dept: RADIOLOGY | Facility: CLINIC | Age: 88
End: 2023-06-28
Payer: COMMERCIAL

## 2023-06-28 ENCOUNTER — OFFICE VISIT (OUTPATIENT)
Dept: URGENT CARE | Facility: CLINIC | Age: 88
End: 2023-06-28
Payer: COMMERCIAL

## 2023-06-28 VITALS
WEIGHT: 122 LBS | HEIGHT: 61 IN | BODY MASS INDEX: 23.03 KG/M2 | TEMPERATURE: 97.2 F | OXYGEN SATURATION: 97 % | RESPIRATION RATE: 18 BRPM | HEART RATE: 95 BPM

## 2023-06-28 DIAGNOSIS — M25.551 RIGHT HIP PAIN: ICD-10-CM

## 2023-06-28 DIAGNOSIS — M25.552 LEFT HIP PAIN: ICD-10-CM

## 2023-06-28 DIAGNOSIS — S70.02XA CONTUSION OF LEFT HIP, INITIAL ENCOUNTER: Primary | ICD-10-CM

## 2023-06-28 PROCEDURE — 73502 X-RAY EXAM HIP UNI 2-3 VIEWS: CPT

## 2023-06-28 PROCEDURE — 99213 OFFICE O/P EST LOW 20 MIN: CPT

## 2023-06-28 RX ORDER — PREDNISONE 10 MG/1
TABLET ORAL
COMMUNITY
Start: 2023-06-21

## 2023-06-28 NOTE — PROGRESS NOTES
[facility]        NAME: Tata Thomas is a 80 y o  female  : 3/12/1928    MRN: [id number]  DATE: 2023  TIME: 11:25 AM    Assessment and Plan   Contusion of left hip, initial encounter [S70 02XA]  1  Contusion of left hip, initial encounter        2  Left hip pain  XR hip/pelv 2-3 vws left if performed        - Xrays show potential FXR of ischium of L hip    - Official report reveals no acute osseus abnormality  Patient Instructions   The final xray result will appear in your mychart  Ice as needed  Acetaminophen and/or ibuprofen for pain and inflammation  Follow-up with orthopedics  PCP follow-up in 3-5 days  Proceed to the ER if symptoms worsen  Chief Complaint     Chief Complaint   Patient presents with   • Fall     Pt presents with hip pain and elbow pain after falling at home x 3 days ago  History of Present Illness       81 y/o F presents for L hip pain x 3 days  Pt admits to falling, landing on the L lateral aspect/glute of legs  She crawled around the floor, called 911, and EMS helped her up  Did no go to hospital  Pt also admits to hurting her R elbow with that fall  Denies any pain in that elbow  Denies hitting head, LOC, or being on blood thinners  Admits to non worsening pain  Pain with upon standing  Review of Systems   Review of Systems   Musculoskeletal: Positive for gait problem          Hip pain         Current Medications       Current Outpatient Medications:   •  losartan (COZAAR) 100 MG tablet, Take 100 mg by mouth daily, Disp: , Rfl:   •  omeprazole (PriLOSEC) 20 mg delayed release capsule, Take 20 mg by mouth daily Take before a meal, Disp: , Rfl:   •  predniSONE 10 mg tablet, TAKE 4 TABLETS FOR 2 DAYS, 3 TABLETS FOR 2 DAYS, 2 TABLETS FOR 2 DAYS, 1 TABLET FOR 2 DAYS THEN OFF, Disp: , Rfl:     Current Allergies     Allergies as of 2023   • (No Known Allergies)            The following portions of the patient's history were reviewed and "updated as appropriate: allergies, current medications, past family history, past medical history, past social history, past surgical history and problem list      Past Medical History:   Diagnosis Date   • Cancer ([facility] [place] [address] )     Cervical, Kidney, melanoma Per MRI Screening form 2/5/19       History reviewed  No pertinent surgical history  No family history on file  Medications have been verified  Objective   Pulse 95   Temp (!) 97 2 °F (36 2 °C)   Resp 18   Ht 5' 1\" (1 549 m)   Wt 55 3 kg (122 lb)   SpO2 97%   BMI 23 05 kg/m²   No LMP recorded  Patient is postmenopausal        Physical Exam     Physical Exam  Vitals and nursing note reviewed  Constitutional:       General: She is not in acute distress  Appearance: She is not toxic-appearing  HENT:      Head: Normocephalic and atraumatic  Eyes:      Conjunctiva/sclera: Conjunctivae normal    Pulmonary:      Effort: Pulmonary effort is normal    Musculoskeletal:      Comments: PT pulse strong B/L  ROM age appropriate and equal B/L  Pain with ROM in all directions  \"Worst\" with flexion and standing  No TTP  Skin:     Comments: Pin point scab on L elbow  No signs of infection   Neurological:      Mental Status: She is alert     Psychiatric:         Mood and Affect: Mood normal          Behavior: Behavior normal                    "

## 2023-07-11 PROBLEM — E87.1 HYPONATREMIA: Status: ACTIVE | Noted: 2023-01-01

## 2023-07-11 PROBLEM — A41.50 SEPSIS DUE TO GRAM-NEGATIVE UTI (HCC): Status: ACTIVE | Noted: 2023-07-11

## 2023-07-11 PROBLEM — N39.0 SEPSIS DUE TO GRAM-NEGATIVE UTI (HCC): Status: ACTIVE | Noted: 2023-01-01

## 2023-07-11 PROBLEM — S32.120A CLOSED NONDISPLACED ZONE II FRACTURE OF SACRUM (HCC): Status: ACTIVE | Noted: 2023-01-01

## 2023-07-11 PROBLEM — Z90.5 H/O LEFT NEPHRECTOMY: Status: ACTIVE | Noted: 2023-01-01

## 2023-07-11 PROBLEM — I73.00 RAYNAUD PHENOMENON: Chronic | Status: ACTIVE | Noted: 2023-01-01

## 2023-07-11 PROBLEM — A41.9 SEPSIS WITHOUT ACUTE ORGAN DYSFUNCTION (HCC): Status: ACTIVE | Noted: 2023-07-11

## 2023-07-11 PROBLEM — D64.9 ANEMIA: Status: ACTIVE | Noted: 2023-01-01

## 2023-07-11 PROBLEM — R53.1 GENERALIZED WEAKNESS: Status: ACTIVE | Noted: 2023-07-11

## 2023-07-11 PROBLEM — N30.00 ACUTE CYSTITIS WITHOUT HEMATURIA: Status: ACTIVE | Noted: 2023-07-11

## 2023-07-11 NOTE — DISCHARGE INSTR - AVS FIRST PAGE
Discharge Instructions - Orthopedics  Edouard Preston 80 y.o. female MRN: [id number]  Unit/Bed#: [facility] 607-01    Weight Bearing Status:                                           Weightbearing as tolerated    DVT prophylaxis  Aspirin 81 mg twice per day for 21 days    Pain:  Continue analgesics as directed    Dressing Instructions:   Please keep clean, dry and intact until follow up     Appt Instructions: If you do not have your appointment, please call the clinic at [phone] t  Otherwise followup as scheduled     Contact the office sooner if you experience any increased numbness/tingling in the extremities. Miscellaneous:   Follow-up in 4 weeks with Dr. Vanesa Tong

## 2023-07-11 NOTE — ED ATTENDING ATTESTATION
7/10/2023  ILuz Maria MD, saw and evaluated the patient. I have discussed the patient with the resident/non-physician practitioner and agree with the resident's/non-physician practitioner's findings, Plan of Care, and MDM as documented in the resident's/non-physician practitioner's note, except where noted. All available labs and Radiology studies were reviewed. I was present for key portions of any procedure(s) performed by the resident/non-physician practitioner and I was immediately available to provide assistance. At this point I agree with the current assessment done in the Emergency Department. I have conducted an independent evaluation of this patient a history and physical is as follows:   The patient presents for evaluation of a fall onto her buttocks mechanical fall the patient was seen only for a fall she had negative x-rays of her hip and pelvis  Been having a difficult time getting around at home she has a family member that checks on her  She did not do her head is not on anticoagulation  Only complains of pain in her left groin area and difficulty with her gait  Patient has no complaints of vomiting or diarrhea no headache no abdominal pain no chest pain no shortness of breath  No history of fever chills  Exam the patient in no acute distress dressed awake alert and orient x3  Temp is 100.4 pulse ox 95%  Lungs clear heart regular abdomen soft nontender is noted over the left groin area there is no rotation or shortening of the lower extremity  Impression: Ambulatory dysfunction fall  ED Course   Will x-ray pelvis and hip concern for big rami fracture  Will obtain labs we will obtain urine  As the patient has a low-grade temperature  Critical Care Time  Procedures    Patient noted to have white count of 21,000 she has a urine that is positive for large leukocytes positive nitrites consistent with urinary tract infection    Patient will be treated with IV Rocephin and admitted to the hospital for  UTI  Fall  Pubic rami fracture  Ambulatory dysfunction

## 2023-07-11 NOTE — PROGRESS NOTES
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  - Status post fall  on 7/10. - Appreciate Orthopedic surgery evaluation, recommendations and interventions as noted. - Non-operative management  - Maintain WBAT B/L LE  - Monitor neurovascular exam.  - Continue multimodal analgesic regimen.  - Continue DVT prophylaxis. - PT and OT evaluation and treatment as indicated. - Outpatient follow up with Orthopedic surgery for re-evaluation. Acute cystitis without hematuria  Assessment & Plan  - UA positive for acute urinary infection  -Lab work indicating leukocytosis  -Afebrile  -Continue Rocephin for 3 doses  -Continue to monitor fever and white count curve  - GCS 15, nonfocal at this time    * Generalized weakness  Assessment & Plan  - History of generalized weakness  - PT and OT as indicated             TRAUMA TERTIARY SURVEY NOTE    VTE Prophylaxis:Heparin     Disposition: Pending pain control, PT and OT evaluations    Code status:  Level 1 - Full Code    Consultants: IP CONSULT TO ORTHOPEDIC SURGERY  IP CONSULT TO GERONTOLOGY    Subjective   Transfer from: none    Mechanism of Injury: Fall     Chief Complaint: "Can you put my legs down?"    HPI/Last 24 hour events: Pt reports she is uncomfortable in the chair at this time because her legs are up. When I help put her legs down, she reports relief.  She reports neuropathy in bilateral feet, otherwise denies any numbness or tingling in the LE. She reports she is urinating and had a BM today. She feels well, reports buttocks pain that is controlled. Objective   Vitals:   Temp:  [97.9 °F (36.6 °C)-100.4 °F (38 °C)] 97.9 °F (36.6 °C)  HR:  [] 69  Resp:  [16-20] 16  BP: (102-124)/(53-60) 104/53    I/O       07/09 0701  07/10 0700 07/10 0701 07/11 0700 07/11 0701 07/12 0700    I. V.  500     IV Piggyback  1050     Total Intake  1550     Net  +1550                   Physical Exam:   GENERAL APPEARANCE: Patient in no acute distress. HEENT: NCAT; PERRL, EOMs intact; Mucous membranes moist  NECK / BACK: Lumbar/ sacral tenderness  CV: Regular rate and rhythm; murmur appreciated. CHEST / LUNGS: Clear to auscultation; no wheezes/rales/rhonci. ABD: NABS; soft; non-distended; non-tender. : Voiding  EXT: +2 pulses bilaterally upper & lower extremities; no edema. NEURO: GCS 15; no focal neurologic deficits; neurovascularly intact. SKIN: Warm, dry and well perfused; no rash; no jaundice. Invasive Devices     Peripheral Intravenous Line  Duration           Peripheral IV 07/10/23 Distal;Left;Upper;Ventral (anterior) Arm <1 day          Drain  Duration           External Urinary Catheter <1 day                   1. Before the illness or injury that brought you to the Emergency, did you need someone to help you on a regular basis? 0=No   2. Since the illness or injury that brought you to the Emergency, have you needed more help than usual to take care of yourself? 1=Yes   3. Have you been hospitalized for one or more nights during the past 6 months (excluding a stay in the Emergency Department)? 1=Yes   4. In general, do you see well? 1=No   5. In general, do you have serious problems with your memory? 0=No   6. Do you take more than three different medications everyday?  1=Yes   TOTAL   4     Did you order a geriatric consult if the score was 2 or greater?: yes         Lab Results: Results: I have personally reviewed all pertinent laboratory/tests results    Imaging Results: I have personally reviewed pertinent reports. Chest Xray(s): N/A   FAST exam(s): N/A   CT Scan(s): positive for acute findings: see below   Additional Xray(s): negative for acute findings     Other Studies:   CT abdomen pelvis wo contrast   Final Result by Deena Mendez MD (07/11 8057)         1. S2 fracture, possibly acute. 2. Chronic T12, L2 and L4 fractures. 3. No subacute intra-abdominal or pelvic trauma. 4. Constipation. 5. Stone filled gallbladder. Mild wall thickening in the fundus. If there is concern for acute cholecystitis, recommend right upper quadrant ultrasound.             Workstation performed: [id number]         XR hips bilateral 2 vw w pelvis if performed    (Results Pending)

## 2023-07-11 NOTE — PLAN OF CARE
Problem: OCCUPATIONAL THERAPY ADULT  Goal: Performs self-care activities at highest level of function for planned discharge setting. See evaluation for individualized goals. Description: Treatment Interventions: ADL retraining, Functional transfer training, Endurance training, Patient/family training, Equipment evaluation/education, Compensatory technique education, Energy conservation, Activityengagement          See flowsheet documentation for full assessment, interventions and recommendations. Note: Limitation: Decreased ADL status, Decreased endurance, Decreased self-care trans, Decreased high-level ADLs  Prognosis: Good  Assessment: Pt admitted to [address] [place] [address] on 7/10/23 due to a fall resulting in a closed nondisplaced zone II fracture of sacrum. Per ortho, pt to follow conservative management and is WBAT in b/l LE. Pt has a past medical history of Cancer ([address]), hyponatremia, and acute cystitis without hematuria. Pt A&O x4. Prior to admission, pt from home alone and was independent with ADLs and functional mobility. Pt receives assistance with IADLs such as driving. Pt is currently min A x1 for bed mobility, transfers, and functional mobility with RW. Pt is min A for UB ADLs and mod A for LB ADLs. Limitations include decreased dynamic sitting balance, decreased dynamic standing balance, decreased functional mobility, decreased endurance, incontinence, fall risk, and decreased independence with ADLs. Pt educated on importance of OOB activities, slow controlled movements with functional mobility, and DME. Pt noted to have increased incontinence during OT session and bedside commode was utilized. The patient's raw score on the AM-PAC Daily Activity Inpatient Short Form is 17. A raw score of less than 19 suggests the patient may benefit from discharge to post-acute rehabilitation services. Please refer to the recommendation of the Occupational Therapist for safe discharge planning.  Pt would continue to benefit from additional skilled occupational therapy services. OT recommends pt to be d/c to inpatient rehab.      OT Discharge Recommendation: Post acute rehabilitation services

## 2023-07-11 NOTE — PLAN OF CARE

## 2023-07-11 NOTE — UTILIZATION REVIEW
Initial Clinical Review    OBSERVATION WRITTEN 7/11/23 @ 0307 CONVERTED TO INPATIENT ADMISSION 7/11/23 @ 1441 DUE TO FURTHER DIAGNOSTIC WORKUP REQUIRED FOR S2 FX, AMBULATORY DYSFUNCTION AND UTI, REQUIRING AT LEAST A 2 MIDNIGHT STAY. Admission: Date/Time/Statement:   Admission Orders (From admission, onward)     Ordered        07/11/23 1441  Inpatient Admission  Once            07/11/23 0307  Place in Observation  Once                      Orders Placed This Encounter   Procedures   • Inpatient Admission     Standing Status:   Standing     Number of Occurrences:   1     Order Specific Question:   Level of Care     Answer:   Med Surg [16]     Order Specific Question:   Estimated length of stay     Answer:   More than 2 Midnights     Order Specific Question:   Certification     Answer:   I certify that inpatient services are medically necessary for this patient for a duration of greater than two midnights. See H&P and MD Progress Notes for additional information about the patient's course of treatment. ED Arrival Information     Expected   -    Arrival   7/10/2023 21:48    Acuity   Urgent            Means of arrival   Ambulance    Escorted by   [facility]    Service   Trauma    Admission type   Emergency            Arrival complaint   Groin pain           Chief Complaint   Patient presents with   • Michaeleen Grout off toilet and landed on buttocks   • Fatigue       Initial Presentation: 80 y.o. female who presents on 7/10/23 S/P fall. Climmie Drain off toilet onto her buttock, no head strike, no LOC, no thinners. Difficulty ambulating x2 weeks after a mechanical fall 2 weeks ago. Was seen at urgent care with negative pelvis XR and discharged. Pain worse today after fall and unable to ambulate. Also had fever and chills about a week ago, on arrival temp 100.4. Pt reports dysuria and urinary frequency for past few days. In ED, pt had positive UA and started on rocephin.  Initially admitted under Observation status then converted to Inpatient dt S2 fx, Ambulatory dysfunction and UTI. Plan: med surg, ortho consult, PT OT, IV ceftriaxone x3 days. 7/11 Inpatient Admission: Per Ortho: Stable pelvic ring injury, progress with ambulation, will see outpt in clinic. PT OT recommending post acute rehab, CM following for dispo planning. Continue above tx plan including abx. Date:  7/12     Day 2:   C/o minimal pain from sacral injury, c/o pain left groin and weakness in left leg, discomfort on buttocks from wound. Not appropriate for discharge yet. Continue PT OT, CM following, continue pain control, neurovascular exam, dvt ppx, continue IV abx, monitor electrolytes and replete prn. Date:  7/12   Day 3: Has surpassed a 2nd midnight with active treatments and services, which include monitor electrolytes and replete prn, pain control, PT OT, CM following for rehab placement.       ED Triage Vitals   Temperature Pulse Respirations Blood Pressure SpO2   07/10/23 2207 07/10/23 2203 07/10/23 2203 07/10/23 2203 07/10/23 2203   100.4 °F (38 °C) 101 20 124/60 95 %      Temp Source Heart Rate Source Patient Position - Orthostatic VS BP Location FiO2 (%)   07/10/23 2207 07/10/23 2203 07/10/23 2203 07/10/23 2203 --   Oral Monitor Sitting Right arm       Pain Score       07/11/23 0739       5          Wt Readings from Last 1 Encounters:   07/11/23 54.4 kg (120 lb)     Additional Vital Signs:   Date/Time Temp Pulse Resp BP MAP (mmHg) SpO2 O2 Device Patient Position - Orthostatic VS   07/12/23 1042 98.5 °F (36.9 °C) 100 16 118/60 -- 96 % -- --   07/12/23 08:16:26 -- 91 -- 121/55 77 96 % None (Room air) --   07/12/23 07:21:52 98.6 °F (37 °C) 97 16 114/63 80 94 % -- --   07/12/23 04:31:44 99 °F (37.2 °C) 98 -- 127/91 103 95 % -- --   07/11/23 22:31:12 100.2 °F (37.9 °C) 97 -- 96/55 69 91 % -- --   07/11/23 19:54:10 100.3 °F (37.9 °C) 98 -- -- -- 95 % -- --   07/11/23 19:15:17 100.8 °F (38.2 °C) Abnormal  102 -- 95/54 68 97 % -- --   07/11/23 14:22:13 98.4 °F (36.9 °C) 90 16 98/52 67 97 % -- --   07/11/23 10:47:33 -- 69 16 104/53 70 94 % -- --   07/11/23 0800 -- -- -- -- -- -- None (Room air) --   07/11/23 07:26:14 97.9 °F (36.6 °C) 83 16 102/54 70 97 % -- --   07/11/23 06:39:40 98 °F (36.7 °C) 90 18 104/54 71 96 % -- --   07/11/23 0200 -- 96 -- -- -- 97 % -- --   07/10/23 2207 100.4 °F (38 °C) -- -- -- -- -- -- --   07/10/23 2203 -- 101 20 124/60 87 95 % None (Room air) Sitting     Pertinent Labs/Diagnostic Test Results:   CT abdomen pelvis wo contrast   Final Result by Cathie Clark MD (07/11 9820)         1. S2 fracture, possibly acute. 2. Chronic T12, L2 and L4 fractures. 3. No subacute intra-abdominal or pelvic trauma. 4. Constipation. 5. Stone filled gallbladder. Mild wall thickening in the fundus. If there is concern for acute cholecystitis, recommend right upper quadrant ultrasound. Workstation performed: [id number]         XR hips bilateral 2 vw w pelvis if performed   Final Result by Marian Mendoza MD (07/11 1631)      Findings are suspicious for pubic rami fractures more likely acute on the left than the right. CT suggested inferior fracture may be chronic; however there appears to be some discontinuity on the frontal pelvis projection. It is possible these were    sustained 2 weeks ago other although difficult to appreciate at that time. Correlation with any point tenderness is advised. Degenerative changes      Findings were discussed with Terie Cooks of trauma surgery on 7/11/2023 at 4:20 p.m.       Workstation performed: [id number]               Results from last 7 days   Lab Units 07/12/23  0432 07/11/23  0832 07/10/23  2239   WBC Thousand/uL 18.19* 18.79* 21.58*   HEMOGLOBIN g/dL 9.4* 9.7* 9.8*   HEMATOCRIT % 27.8* 29.4* 29.6*   PLATELETS Thousands/uL 169 148* 150   NEUTROS ABS Thousands/µL 15.50* 15.45* 18.53*         Results from last 7 days   Lab Units 07/12/23  0432 07/11/23  0833 07/10/23  2457 SODIUM mmol/L 126* 129* 129*   POTASSIUM mmol/L 4.0 3.8 4.4   CHLORIDE mmol/L 101 98 99   CO2 mmol/L 20* 22 22   ANION GAP mmol/L 5 9 8   BUN mg/dL 16 20 21   CREATININE mg/dL 0.92 1.05 1.12   EGFR ml/min/1.73sq m 53 45 41   CALCIUM mg/dL 8.2* 8.3 8.7             Results from last 7 days   Lab Units 07/12/23  0432 07/11/23  0833 07/10/23  2239   GLUCOSE RANDOM mg/dL 93 90 114     Results from last 7 days   Lab Units 07/11/23  1201   OSMOLALITY, SERUM mmol/*     Results from last 7 days   Lab Units 07/11/23  1222 07/11/23  1201   OSMOLALITY, SERUM mmol/KG  --  265*   OSMO UR mmol/  --      Results from last 7 days   Lab Units 07/11/23  1222 07/10/23  2251   CLARITY UA   --  Turbid   COLOR UA   --  Yellow   SPEC GRAV UA   --  1.010   PH UA   --  5.5   GLUCOSE UA mg/dl  --  Negative   KETONES UA mg/dl  --  Negative   BLOOD UA   --  Moderate*   PROTEIN UA mg/dl  --  Trace*   NITRITE UA   --  Positive*   BILIRUBIN UA   --  Negative   UROBILINOGEN UA (BE) mg/dl  --  <2.0   LEUKOCYTES UA   --  Large*   WBC UA /hpf  --  Innumerable*   RBC UA /hpf  --  30-50*   BACTERIA UA /hpf  --  Innumerable*   EPITHELIAL CELLS WET PREP /hpf  --  Occasional   SODIUM UR  16  --      Results from last 7 days   Lab Units 07/10/23  2251   URINE CULTURE  >100,000 cfu/ml Gram Negative [place] Enteric Like*     ED Treatment:   Medication Administration from 07/10/2023 2148 to 07/11/2023 2619       Date/Time Order Dose Route Action     07/11/2023 0056 EDT cefTRIAXone (ROCEPHIN) 2,000 mg in dextrose 5 % 50 mL IVPB 2,000 mg Intravenous New Bag     07/11/2023 0106 EDT sodium chloride 0.9 % bolus 1,000 mL 1,000 mL Intravenous New Bag     07/11/2023 0056 EDT acetaminophen (TYLENOL) tablet 975 mg 975 mg Oral Given        Past Medical History:   Diagnosis Date   • Cancer (HCC)     Cervical, Kidney, melanoma Per MRI Screening form 2/5/19     Present on Admission:  • Generalized weakness  • Sepsis due to gram-negative UTI (HCC)  • Acute cystitis without hematuria  • Closed nondisplaced zone II fracture of sacrum (HCC)  • Hyponatremia  • Anemia  • Primary hypertension  • Raynaud phenomenon  • Fall      Admitting Diagnosis: UTI (urinary tract infection) [N39.0]  Fatigue [R53.83]  Sacral fracture ([address]) [S32.10XA]  Fall, initial encounter [W19. XXXA]  Unspecified multiple injuries, initial encounter [T07. XXXA]  Age/Sex: 80 y.o. female  Admission Orders:  Scheduled Medications:  acetaminophen, 650 mg, Oral, Q6H [address]  cefTRIAXone, 1,000 mg, Intravenous, Q24H  heparin (porcine), 5,000 Units, Subcutaneous, Q8H NELSY  losartan, 100 mg, Oral, Daily  pantoprazole, 40 mg, Oral, Early Morning  polyethylene glycol, 17 g, Oral, Daily  senna-docusate sodium, 1 tablet, Oral, HS  sodium chloride, 1 g, Oral, TID With Meals      Continuous IV Infusions: none     PRN Meds:  HYDROmorphone, 0.2 mg, Intravenous, Q4H PRN  oxyCODONE, 5 mg, Oral, Q6H PRN x1 thus far  oxyCODONE, 2.5 mg, Oral, Q6H PRN    scd  Inc spirometry    IP CONSULT TO ORTHOPEDIC SURGERY  IP CONSULT TO GERONTOLOGY    Network Utilization Review Department  ATTENTION: Please call with any questions or concerns to [phone] and carefully listen to the prompts so that you are directed to the right person. All voicemails are confidential.  Barnie Smoke all requests for admission clinical reviews, approved or denied determinations and any other requests to dedicated fax number below belonging to the campus where the patient is receiving treatment.  List of dedicated fax numbers for the Facilities:  [place] DENIALS (Administrative/Medical Necessity) [phone] [facility] 367 [facility] 1000 [facility] [phone] [address] [phone]   [facility] [address] [address] [facility] [address] 1300 [facility]  [address]  [phone]

## 2023-07-11 NOTE — ASSESSMENT & PLAN NOTE
- Patient with sepsis secondary to UTI, present on presentation.   - Sepsis now resolving with resuscitation and initiation of IV antibiotics to treat infectious etiology. - Continue to monitor hemodynamic status and for appropriate response to antibiotic therapy. - Outpatient follow-up with PCP.

## 2023-07-11 NOTE — ED NOTES
Patient transported to [address] [place] [address]  07/11/23 0126 Quality 110: Preventive Care And Screening: Influenza Immunization: Influenza Immunization not Administered because Patient Refused. Detail Level: Detailed

## 2023-07-11 NOTE — ASSESSMENT & PLAN NOTE
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

## 2023-07-11 NOTE — OCCUPATIONAL THERAPY NOTE
Occupational Therapy Evaluation     Patient Name: Andre Montano  CCQVG'Y Date: 7/11/2023  Problem List  Principal Problem:    Generalized weakness  Active Problems:    Sepsis without acute organ dysfunction ([address])    Acute cystitis without hematuria    Closed nondisplaced zone II fracture of sacrum (HCC)    Hyponatremia    Anemia    Primary hypertension    H/O left nephrectomy    Raynaud phenomenon    Past Medical History  Past Medical History:   Diagnosis Date    Cancer ([address])     Cervical, Kidney, melanoma Per MRI Screening form 2/5/19     Past Surgical History  Past Surgical History:   Procedure Laterality Date    NEPHRECTOMY Left         07/11/23 0916   OT Last Visit   OT Visit Date 07/11/23   Note Type   Note type Evaluation   Pain Assessment   Pain Assessment Tool 0-10   Pain Score No Pain   Hospital Pain Intervention(s) Repositioned; Ambulation/increased activity   Restrictions/Precautions   Weight Bearing Precautions Per Order Yes   RLE Weight Bearing Per Order WBAT   LLE Weight Bearing Per Order WBAT   Other Precautions Telemetry; Fall Risk   Home Living   Type of [address] [place] [address] Avenue One level;Performs ADLs on one level;Elevator  (0 DUNIA; 2nd floor apartment with elevator access)   Bathroom Shower/Tub Tub/shower unit   Bathroom Toilet Standard   Bathroom Equipment Grab bars in [address]   Additional Comments Pt reports using a walker prior to admission. Prior Function   [place] of [place] Independent with ADLs; Independent with functional mobility; Needs assistance with IADLS   Lives With (S)  Alone   Receives Help From Family; Neighbor  (Pt reports that her daughter lives locally and can provide assistance when needed.)   IADLs Independent with meal prep; Independent with medication management; Family/Friend/Other provides transportation   Falls in the last 6 months 1 to 4   Vocational Retired   Lifestyle   Autonomy Pt I with ADLs and functional mobility prior to admission. Pt recieves assistance with IADLs such as dricing. Reciprocal Relationships Pt receives assistance from supportive daughter. Service to Others Pt is retired. Intrinsic Gratification Pt enjoys watching TV. ADL   Where Assessed Edge of bed   Eating Assistance 7  Independent   Grooming Assistance 5  Supervision/Setup   UB Bathing Assistance 4  Minimal Assistance   LB Bathing Assistance 3  Moderate Assistance   UB Dressing Assistance 4  Minimal [place] 3  Moderate [address]  4  Minimal Assistance   Functional Assistance 4  Minimal Assistance   Bed Mobility   Supine to Sit 4  Minimal assistance   Additional items Assist x 1; Increased time required;Verbal cues   Transfers   Sit to Stand 4  Minimal assistance   Additional items Assist x 1; Increased time required;Verbal cues   Stand to Sit 4  Minimal assistance   Additional items Assist x 1; Increased time required;Verbal cues   Toilet transfer 4  Minimal assistance   Additional items Assist x 1; Increased time required;Verbal cues   Additional Comments Pt left in chair with all needs in reach. Functional Mobility   Functional Mobility 4  Minimal assistance   Additional Comments Assist x1; Increased time required; Verbal cues   Additional items Rolling walker   Balance   Static Sitting Fair +   Dynamic Sitting Fair   Static Standing Fair -   Dynamic Standing Poor +   Ambulatory Poor   Activity Tolerance   Activity Tolerance Patient tolerated treatment well   Medical Staff Made Aware Kailey Hastings, OT; Cody Johnson, PT; Juliana Sifuentes, SPT; seen with PT due to pt's current medical condition. Nurse Made Aware Pt appropriate to see per nsg. RUE Assessment   RUE Assessment WFL   LUE Assessment   LUE Assessment WFL   Cognition   Overall Cognitive Status WFL   Arousal/Participation Alert; Responsive; Cooperative   Attention Within functional limits   Orientation Level Oriented X4   Memory Within functional limits   Following Commands Follows multistep commands with increased time or repetition   Comments Pt pleasant and cooperative. Pt displayed G safety awareness and insight to self/condition. Assessment   Limitation Decreased ADL status; Decreased endurance;Decreased self-care trans;Decreased high-level ADLs   Prognosis Good   Assessment Pt admitted to [facility] on [facility]10[facility]23 due to a fall resulting in a closed nondisplaced zone II fracture of sacrum. Per ortho, pt to follow conservative management and is WBAT in b/l LE. Pt has a past medical history of Cancer ([address]), hyponatremia, and acute cystitis without hematuria. Pt A&O x4. Prior to admission, pt from home alone and was independent with ADLs and functional mobility. Pt receives assistance with IADLs such as driving. Pt is currently min A x1 for bed mobility, transfers, and functional mobility with RW. Pt is min A for UB ADLs and mod A for LB ADLs. Limitations include decreased dynamic sitting balance, decreased dynamic standing balance, decreased functional mobility, decreased endurance, incontinence, fall risk, and decreased independence with ADLs. Pt educated on importance of OOB activities, slow controlled movements with functional mobility, and DME. Pt noted to have increased incontinence during OT session and bedside commode was utilized. The patient's raw score on the AM-PAC Daily Activity Inpatient Short Form is 17. A raw score of less than 19 suggests the patient may benefit from discharge to post-acute rehabilitation services. Please refer to the recommendation of the Occupational Therapist for safe discharge planning. Pt would continue to benefit from additional skilled occupational therapy services. OT recommends pt to be d/c to inpatient rehab. Goals   Patient Goals "to be more independent"   LT Time Frame 10-14   Long Term Goal #1 See goals listed below   Plan   Treatment Interventions ADL retraining;Functional transfer training; Endurance training;Patient/family training;Equipment evaluation/education; Compensatory technique education; Energy conservation; Activityengagement   Goal Expiration Date 07/25/23   OT Frequency 3-5x/wk   Recommendation   OT Discharge Recommendation Post acute rehabilitation services   AM-PAC Daily Activity Inpatient   Lower Body Dressing 2   Bathing 2   Toileting 3   Upper Body Dressing 3   Grooming 3   Eating 4   Daily Activity Raw Score 17   Daily Activity Standardized Score (Calc for Raw Score >=11) 37.26   AM-PAC Applied Cognition Inpatient   Following a Speech/Presentation 4   Understanding Ordinary Conversation 4   Taking Medications 4   Remembering Where Things Are Placed or Put Away 3   Remembering List of 4-5 Errands 3   Taking Care of Complicated Tasks 2   Applied Cognition Raw Score 20   Applied Cognition Standardized Score 41.76        Goals to be achieved within 14 days:  1) Pt will perform bed mobility tasks with MOD I to increase activity engagement. 2) Pt will complete functional transfers/mobility MOD I for completion of ADLs. 3) Pt will perform grooming tasks MOD I with G tolerance. 4) Pt will complete UB/LB dressing MOD I utilizing compensatory techniques. 5) Pt will increase activity tolerance by participating in functional activities for 30 minutes. 6) Pt will complete toileting with MOD I utilizing DME.     CHILANGO Ramon

## 2023-07-11 NOTE — ASSESSMENT & PLAN NOTE
- UA positive for acute urinary infection  -Lab work indicating leukocytosis  -Afebrile  -Continue Rocephin for 3 doses  -Continue to monitor fever and white count curve  - GCS 15, nonfocal at this time

## 2023-07-11 NOTE — ED PROVIDER NOTES
History  Chief Complaint   Patient presents with   • Cherylynn Divers off toilet and landed on buttocks   • Fatigue     HPI  Patient is 66-year-old female presenting for mechanical fall. Past medical history significant for fall approximately 2 weeks ago however work-up at that time was negative. Past medical history also significant for hyponatremia, hypertension. Patient states she was attempting to get off of the toilet when her legs gave out and she landed on her rear. Patient currently endorsing bilateral hip pain. Patient denies any head strike, loss consciousness, is not any blood thinners. Patient normally ambulates with a walker at home. Has been having difficulty due to leg pain from previous fall. Patient lives alone. Patient currently denies any chest pain, lightheadedness, dizziness, abdominal pain, shortness of breath, numbness or lateralizing weakness. Patient does also endorse dysuria, increased urinary frequency and remote history of fever/chills (patient is febrile on presentation) that began last 24 to 48 hours. She denies any hematuria, flank pain. Prior to Admission Medications   Prescriptions Last Dose Informant Patient Reported? Taking?   losartan (COZAAR) 100 MG tablet   Yes No   Sig: Take 100 mg by mouth daily   omeprazole (PriLOSEC) 20 mg delayed release capsule   Yes No   Sig: Take 20 mg by mouth daily Take before a meal   predniSONE 10 mg tablet   Yes No   Sig: TAKE 4 TABLETS FOR 2 DAYS, 3 TABLETS FOR 2 DAYS, 2 TABLETS FOR 2 DAYS, 1 TABLET FOR 2 DAYS THEN OFF      Facility-Administered Medications: None       Past Medical History:   Diagnosis Date   • Cancer (HCC)     Cervical, Kidney, melanoma Per MRI Screening form 2/5/19       History reviewed. No pertinent surgical history. History reviewed. No pertinent family history. I have reviewed and agree with the history as documented.     E-Cigarette/Vaping     E-Cigarette/Vaping Substances   • Nicotine No    • THC No    • CBD No • Flavoring No    • Other No    • Unknown No      Social History     Tobacco Use   • Smoking status: Never   • Smokeless tobacco: Never   Substance Use Topics   • Drug use: Not Currently        Review of Systems   Constitutional: Negative. HENT: Negative. Eyes: Negative. Respiratory: Negative. Cardiovascular: Negative. Gastrointestinal: Negative. Endocrine: Negative. Genitourinary: Positive for dysuria and frequency. Musculoskeletal:        Hip pain from mechanical fall. Skin: Negative. Allergic/Immunologic: Negative. Neurological: Negative. Hematological: Negative. Psychiatric/Behavioral: Negative. Physical Exam  ED Triage Vitals   Temperature Pulse Respirations Blood Pressure SpO2   07/10/23 2207 07/10/23 2203 07/10/23 2203 07/10/23 2203 07/10/23 2203   100.4 °F (38 °C) 101 20 124/60 95 %      Temp Source Heart Rate Source Patient Position - Orthostatic VS BP Location FiO2 (%)   07/10/23 2207 07/10/23 2203 07/10/23 2203 07/10/23 2203 --   Oral Monitor Sitting Right arm       Pain Score       --                    Orthostatic Vital Signs  Vitals:    07/10/23 2203 07/11/23 0200   BP: 124/60    Pulse: 101 96   Patient Position - Orthostatic VS: Sitting        Physical Exam  Vitals and nursing note reviewed. Constitutional:       Appearance: Normal appearance. She is normal weight. HENT:      Head: Normocephalic and atraumatic. Right Ear: Tympanic membrane, ear canal and external ear normal.      Left Ear: Tympanic membrane, ear canal and external ear normal.      Nose: Nose normal.      Mouth/Throat:      Mouth: Mucous membranes are moist.      Pharynx: Oropharynx is clear. Eyes:      Extraocular Movements: Extraocular movements intact. Conjunctiva/sclera: Conjunctivae normal.      Pupils: Pupils are equal, round, and reactive to light. Cardiovascular:      Rate and Rhythm: Normal rate and regular rhythm. Pulses: Normal pulses.       Heart sounds: Normal heart sounds. Pulmonary:      Effort: Pulmonary effort is normal.      Breath sounds: Normal breath sounds. Abdominal:      General: Abdomen is flat. Bowel sounds are normal.      Palpations: Abdomen is soft. Musculoskeletal:      Cervical back: Normal range of motion and neck supple. No rigidity or tenderness. Comments: Patient has decreased range of motion at the hip due to pain. No appreciable numbness or tingling, no apparent sensory deficits. No palpable deformities or bony tenderness to palpation. Patient has no midline spinal tenderness palpation, step-offs, deformities. Skin:     General: Skin is warm and dry. Capillary Refill: Capillary refill takes less than 2 seconds. Neurological:      General: No focal deficit present. Mental Status: She is alert and oriented to person, place, and time. Psychiatric:         Mood and Affect: Mood normal.         Behavior: Behavior normal.         Thought Content:  Thought content normal.         Judgment: Judgment normal.         ED Medications  Medications   losartan (COZAAR) tablet 100 mg (has no administration in time range)   pantoprazole (PROTONIX) EC tablet 40 mg (has no administration in time range)   heparin (porcine) subcutaneous injection 5,000 Units (has no administration in time range)   senna (SENOKOT) tablet 17.2 mg (has no administration in time range)   cefTRIAXone (ROCEPHIN) 1,000 mg in dextrose 5 % 50 mL IVPB (has no administration in time range)   acetaminophen (TYLENOL) tablet 650 mg (has no administration in time range)   oxyCODONE (ROXICODONE) split tablet 2.5 mg (has no administration in time range)   oxyCODONE (ROXICODONE) IR tablet 5 mg (has no administration in time range)   HYDROmorphone HCl (DILAUDID) injection 0.2 mg (has no administration in time range)   cefTRIAXone (ROCEPHIN) 2,000 mg in dextrose 5 % 50 mL IVPB (0 mg Intravenous Stopped 7/11/23 0223)   sodium chloride 0.9 % bolus 1,000 mL (0 mL Intravenous Stopped 7/11/23 0409)   acetaminophen (TYLENOL) tablet 975 mg (975 mg Oral Given 7/11/23 0056)       Diagnostic Studies  Results Reviewed     Procedure Component Value Units Date/Time    Basic metabolic panel [[id number]]     Lab Status: No result Specimen: Blood     CBC and differential [[id number]]     Lab Status: No result Specimen: Blood     Platelet count [[id number]]     Lab Status: No result Specimen: Blood     Basic metabolic panel [[id number]]  (Abnormal) Collected: 07/10/23 2239    Lab Status: Final result Specimen: Blood from Arm, Left Updated: 07/10/23 2320     Sodium 129 mmol/L      Potassium 4.4 mmol/L      Chloride 99 mmol/L      CO2 22 mmol/L      ANION GAP 8 mmol/L      BUN 21 mg/dL      Creatinine 1.12 mg/dL      Glucose 114 mg/dL      Calcium 8.7 mg/dL      eGFR 41 ml/min/1.73sq m     Narrative:      [facility] guidelines for Chronic Kidney Disease (CKD):   •  Stage 1 with normal or high GFR (GFR > 90 mL/min/1.73 square meters)  •  Stage 2 Mild CKD (GFR = 60-89 mL/min/1.73 square meters)  •  Stage 3A Moderate CKD (GFR = 45-59 mL/min/1.73 square meters)  •  Stage 3B Moderate CKD (GFR = 30-44 mL/min/1.73 square meters)  •  Stage 4 Severe CKD (GFR = 15-29 mL/min/1.73 square meters)  •  Stage 5 End Stage CKD (GFR <15 mL/min/1.73 square meters)  Note: GFR calculation is accurate only with a steady state creatinine    Urine Microscopic [[id number]]  (Abnormal) Collected: 07/10/23 2251    Lab Status: Final result Specimen: Urine, Clean Catch Updated: 07/10/23 2313     RBC, UA 30-50 /hpf      WBC, UA Innumerable /hpf      Epithelial Cells Occasional /hpf      Bacteria, UA Innumerable /hpf      WBC Clumps Present    Urine culture [[id number]] Collected: 07/10/23 2251    Lab Status:  In process Specimen: Urine, Clean Catch Updated: 07/10/23 2313    UA w Reflex to Microscopic w Reflex to Culture [[id number]]  (Abnormal) Collected: 07/10/23 2251    Lab Status: Final result Specimen: Urine, Clean Catch Updated: 07/10/23 2300     Color, UA Yellow     Clarity, UA Turbid     Specific Gravity, UA 1.010     pH, UA 5.5     Leukocytes, UA Large     Nitrite, UA Positive     Protein, UA Trace mg/dl      Glucose, UA Negative mg/dl      Ketones, UA Negative mg/dl      Urobilinogen, UA <2.0 mg/dl      Bilirubin, UA Negative     Occult Blood, UA Moderate    CBC and differential [[id number]]  (Abnormal) Collected: 07/10/23 2239    Lab Status: Final result Specimen: Blood from Arm, Left Updated: 07/10/23 2256     WBC 21.58 Thousand/uL      RBC 3.33 Million/uL      Hemoglobin 9.8 g/dL      Hematocrit 29.6 %      MCV 89 fL      MCH 29.4 pg      MCHC 33.1 g/dL      RDW 13.7 %      MPV 9.7 fL      Platelets 657 Thousands/uL      nRBC 0 /100 WBCs      Neutrophils Relative 86 %      Immat GRANS % 1 %      Lymphocytes Relative 8 %      Monocytes Relative 5 %      Eosinophils Relative 0 %      Basophils Relative 0 %      Neutrophils Absolute 18.53 Thousands/µL      Immature Grans Absolute 0.26 Thousand/uL      Lymphocytes Absolute 1.77 Thousands/µL      Monocytes Absolute 0.99 Thousand/µL      Eosinophils Absolute 0.01 Thousand/µL      Basophils Absolute 0.02 Thousands/µL                  CT abdomen pelvis wo contrast   Final Result by Neli Momin MD (07/11 1234)         1. S2 fracture, possibly acute. 2. Chronic T12, L2 and L4 fractures. 3. No subacute intra-abdominal or pelvic trauma. 4. Constipation. 5. Stone filled gallbladder. Mild wall thickening in the fundus. If there is concern for acute cholecystitis, recommend right upper quadrant ultrasound. Workstation performed: [id number]         XR hips bilateral 2 vw w pelvis if performed    (Results Pending)         Procedures  Procedures      ED Course                             SBIRT 20yo+    Flowsheet Row Most Recent Value   Initial Alcohol Screen: US AUDIT-C     1.  How often do you have a drink containing alcohol? 0 Filed at: 07/10/2023 2314   2. How many drinks containing alcohol do you have on a typical day you are drinking? 0 Filed at: 07/10/2023 2314   3b. FEMALE Any Age, or MALE 65+: How often do you have 4 or more drinks on one occassion? 0 Filed at: 07/10/2023 2314   Audit-C Score 0 Filed at: 07/10/2023 2314   LUIS MANUEL: How many times in the past year have you. .. Used an illegal drug or used a prescription medication for non-medical reasons? Never Filed at: 07/10/2023 2314                Medical Decision Making  Patient is 17-year-old female presenting for mechanical fall and possible urinary symptoms. DDx: UTI, hip fracture versus contusion  Based on patient and physical exam findings, will evaluate for UTI given urinary symptoms with possible fever/chills. Will obtain x-ray of right and left hip given bilateral hip pain and history of mechanical fall. If any concerns of fracture found on x-ray we will follow-up with CT scan abdomen pelvis. Baseline blood work will also be ordered. -X-ray was concerning for possible fracture, opted for CT for definitive evaluation. CT was significant for S2 fracture. Discussed with trauma and medicine team.  Plan for admission to trauma service at this time. -UA was positive for UTI, Rocephin ordered, IV fluids ordered, Toradol ordered. Rest of care per patient team.    Fall, initial encounter: acute illness or injury  Sacral fracture [facility]): acute illness or injury  UTI (urinary tract infection): acute illness or injury  Amount and/or Complexity of Data Reviewed  Labs: ordered. Radiology: ordered. Risk  OTC drugs. Decision regarding hospitalization. Disposition  Final diagnoses:   Fall, initial encounter   Sacral fracture [facility])   UTI (urinary tract infection)     Time reflects when diagnosis was documented in both MDM as applicable and the Disposition within this note     Time User Action Codes Description Comment    7/11/2023  2:45 AM Rory Mendoza.[place]. TFTW] Fall, initial encounter     7/11/2023  2:54 AM Arcelia Neal Add [S32.10XA] Sacral fracture ([address])     7/11/2023  4:56 AM Arcelia Neal Add [N39.0] UTI (urinary tract infection)       ED Disposition     ED Disposition   Admit    Condition   Stable    Date/Time   Tue Jul 11, 2023  2:45 AM    Comment   Case was discussed with Dr. Alfredo Molina and the patient's admission status was agreed to be Admission Status: inpatient status to the service of Dr. Alfredo Molina . Follow-up Information    None         Patient's Medications   Discharge Prescriptions    No medications on file     No discharge procedures on file. PDMP Review     None           ED Provider  Attending physically available and evaluated Kaia Salazar. I managed the patient along with the ED Attending.     Electronically Signed by         Arcelia Neal MD  07/11/23 9202

## 2023-07-11 NOTE — CASE MANAGEMENT
Case Management Assessment & Discharge Planning Note    Patient name Elmer Celeste  Location [address] [place] [address] [address] [id number] MRN [id number]  : 3/12/1928 Date 2023       Current Admission Date: 7/10/2023  Current Admission Diagnosis:Generalized weakness   Patient Active Problem List    Diagnosis Date Noted   • Generalized weakness 2023   • Sepsis without acute organ dysfunction ([address]) 2023   • Acute cystitis without hematuria 2023   • Closed nondisplaced zone II fracture of sacrum ([address]) 2023   • Hyponatremia 2023   • Anemia 2023   • H/O left nephrectomy 2023   • Raynaud phenomenon 2023   • Arthritis of carpometacarpal (CMC) joint of right thumb 2022   • Primary osteoarthritis of right wrist 2022   • Closed fracture of right proximal humerus 11/10/2022   • Primary hypertension 2014      LOS (days): 1  Geometric Mean LOS (GMLOS) (days):   Days to GMLOS:     OBJECTIVE:              Current admission status: Observation       Preferred Pharmacy:   [facility] [address] [facility],[address], [address] [place] [address],[address]  [address] [place] [id number]  Phone: [phone] Fax: [phone]    Primary Care Provider: Ana María Lauren MD    Primary Insurance: [facility]  Secondary Insurance:     ASSESSMENT:  [facility] Proxies    There are no active Health Care Proxies on file.          Readmission Root Cause  30 Day Readmission: No    Patient Information  Admitted from[de-identified] Home  Mental Status: Alert  During Assessment patient was accompanied by: Not accompanied during assessment  Assessment information provided by[de-identified] Patient  Primary Caregiver: Self  Support Systems: Daughter, Friends/neighbors, Self  [facility]: [address] [place] [address] do you live in?: [place]  In the last 12 months, was there a time when you were not able to pay the mortgage or rent on time?: No  In the last 12 months, how many places have you lived?: 1  In the last 12 months, was there a time when you did not have a steady place to sleep or slept in a shelter (including now)?: No  Homeless/housing insecurity resource given?: N/A  Living Arrangements: Lives Alone  Is patient a ?: No    Activities of Daily Living Prior to Admission  Functional Status: Independent  Completes ADLs independently?: Yes  Ambulates independently?: Yes  Does patient use assisted devices?: No  Does patient currently own DME?: No  Does patient have a history of Outpatient Therapy (PT/OT)?: No  Does the patient have a history of Short-Term Rehab?: No  Does patient have a history of HHC?: No  Does patient currently have [facility]?: No         Patient Information Continued  Income Source: Employed  Does patient have prescription coverage?: Yes  Within the past 12 months, you worried that your food would run out before you got the money to buy more.: Never true  Within the past 12 months, the food you bought just didn't last and you didn't have money to get more.: Never true  Food insecurity resource given?: N/A  Does patient receive dialysis treatments?: No  Does patient have a history of substance abuse?: No         Means of Transportation  Means of Transport to Appts[de-identified] Family transport  In the past 12 months, has lack of transportation kept you from medical appointments or from getting medications?: No  In the past 12 months, has lack of transportation kept you from meetings, work, or from getting things needed for daily living?: No  Was application for public transport provided?: N/A        DISCHARGE DETAILS:    Pt was seen by OT/PT and recommended for IP rehab. CM discussed with pt and her dtr.  Both would like referrals to [facility] and [address] [place] [address].   Pt can likely d/c in 24-48 hours    CM reviewed d/c planning process including the following: identifying help at home, patient preference for d/c planning needs, Discharge Lounge, [facility] Meds to Bed program, availability of treatment team to discuss questions or concerns patient and/or family may have regarding understanding medications and recognizing signs and symptoms once discharged. CM also encouraged patient to follow up with all recommended appointments after discharge. Patient advised of importance for patient and family to participate in managing patient’s medical well being.

## 2023-07-11 NOTE — ASSESSMENT & PLAN NOTE
- Patient with UTI/acute cystitis without hematuria, present on presentation.   - UA from 7/11/2023 consistent with acute urinary infection; urine culture results pending.   - Additional/contributing data to support UTI includes fever on admission and leukocytosis. - Continue Rocephin for 3 doses through 7/13/2023.  - Continue to monitor temperature trend and CBC/white blood cell count trend. - Outpatient follow-up with PCP.

## 2023-07-11 NOTE — H&P
H&P - Trauma   Elmer Celeste 80 y.o. female MRN: [id number]  Unit/Bed#: ED 19 Encounter: [id number]    Trauma Alert: Evaluation; trauma team notified at [phone] via in person   Model of Arrival: Ambulance    Trauma Team: Attending Dr. Mya Diana and Residents Dr. Delia Cooper  Consultants:     Orthopedics: routine consult; Epic consult order placed; Assessment/Plan   Active Problems / Assessment:   S2 fracture  Ambulatory dysfunction  UTI     Plan:   Ortho consult  PT/OT  Ceftriaxone x3 days     History of Present Illness     Chief Complaint: pain with walking  Mechanism:Fall     HPI:    Elmer Celeste is a 80 y.o. female who presents with fall. Cade Peed off toilet onto her buttock, no head strike, no LOC, no thinners. Difficulty ambulating x2 weeks after a mechanical fall 2 weeks ago. Was seen at urgent care with negative pelvis XR and discharged. Pain worse today after fall and unable to ambulate. At rest, patient has no complaints. Also had fever and chills about a week ago, on arrival temp 100.4. Pt reports dysuria and urinary frequency for past few days. In ED, pt had positive UA and started on rocephin. Review of Systems   Constitutional: Positive for chills and fever. Negative for fatigue. HENT: Negative for trouble swallowing and voice change. Respiratory: Negative for shortness of breath and wheezing. Cardiovascular: Positive for leg swelling. Negative for chest pain. Gastrointestinal: Negative for nausea and vomiting. Genitourinary: Positive for dysuria and frequency. Musculoskeletal: Positive for gait problem. Negative for neck pain. Skin: Negative for rash and wound. Neurological: Negative for syncope and weakness. 12-point, complete review of systems was reviewed and negative except as stated above. Historical Information     Past Medical History:   Diagnosis Date   • Cancer ([address])     Cervical, Kidney, melanoma Per MRI Screening form 2/5/19     History reviewed.  No pertinent surgical history. Social History     Tobacco Use   • Smoking status: Never   • Smokeless tobacco: Never   Substance Use Topics   • Drug use: Not Currently     Immunization History   Administered Date(s) Administered   • COVID-19 MODERNA VACC 0.5 ML IM 01/26/2021, 03/05/2021     Last Tetanus: unknown  Family History: Non-contributory    1. Before the illness or injury that brought you to the Emergency, did you need someone to help you on a regular basis? 0=No   2. Since the illness or injury that brought you to the Emergency, have you needed more help than usual to take care of yourself? 1=Yes   3. Have you been hospitalized for one or more nights during the past 6 months (excluding a stay in the Emergency Department)? 0=No   4. In general, do you see well? 0=Yes   5. In general, do you have serious problems with your memory? 0=No   6. Do you take more than three different medications everyday? 0=No   TOTAL   1     Did you order a geriatric consult if the score was 2 or greater?: n/a     Meds/Allergies   all current active meds have been reviewed, current meds:   Current Facility-Administered Medications   Medication Dose Route Frequency   • acetaminophen (TYLENOL) tablet 650 mg  650 mg Oral Q6H [facility]   • [START ON 7/12/2023] cefTRIAXone (ROCEPHIN) 1,000 mg in dextrose 5 % 50 mL IVPB  1,000 mg Intravenous Q24H   • heparin (porcine) subcutaneous injection 5,000 Units  5,000 Units Subcutaneous Q8H [facility]   • HYDROmorphone HCl (DILAUDID) injection 0.2 mg  0.2 mg Intravenous Q4H PRN   • losartan (COZAAR) tablet 100 mg  100 mg Oral Daily   • oxyCODONE (ROXICODONE) IR tablet 5 mg  5 mg Oral Q6H PRN   • oxyCODONE (ROXICODONE) split tablet 2.5 mg  2.5 mg Oral Q6H PRN   • pantoprazole (PROTONIX) EC tablet 40 mg  40 mg Oral Early Morning   • senna (SENOKOT) tablet 17.2 mg  2 tablet Oral Daily    and PTA meds:   Prior to Admission Medications   Prescriptions Last Dose Informant Patient Reported?  Taking?   losartan (COZAAR) 100 MG tablet   Yes No   Sig: Take 100 mg by mouth daily   omeprazole (PriLOSEC) 20 mg delayed release capsule   Yes No   Sig: Take 20 mg by mouth daily Take before a meal   predniSONE 10 mg tablet   Yes No   Sig: TAKE 4 TABLETS FOR 2 DAYS, 3 TABLETS FOR 2 DAYS, 2 TABLETS FOR 2 DAYS, 1 TABLET FOR 2 DAYS THEN OFF      Facility-Administered Medications: None      No Known Allergies    Objective   Initial Vitals:   Temperature: 100.4 °F (38 °C) (07/10/23 2207)  Pulse: 101 (07/10/23 2203)  Respirations: 20 (07/10/23 2203)  Blood Pressure: 124/60 (07/10/23 2203)    Primary Survey:   Airway:        Status: patent;        Pre-hospital Interventions: none        Hospital Interventions: none  Breathing:        Pre-hospital Interventions: none       Effort: normal       Right breath sounds: normal       Left breath sounds: normal  Circulation:        Rhythm: regular       Rate: regular   Right Pulses Left Pulses    R radial: 2+    R pedal: 1+     L radial: 2+    L pedal: 1+       Disability:        GCS: Eye: 4; Verbal: 5 Motor: 6 Total: 15       Right Pupil: 3 mm;  round;  reactive         Left Pupil:  3 mm;  round;  reactive      R Motor Strength L Motor Strength    R : 5/5  R dorsiflex: 5/5  R plantarflex: 5/5 L : 5/5  L dorsiflex: 5/5  L plantarflex: 5/5        Sensory:  No sensory deficit  Exposure:       Completed: Yes      Secondary Survey:  Physical Exam  Constitutional:       General: She is not in acute distress. Appearance: Normal appearance. She is not ill-appearing. HENT:      Head: Normocephalic and atraumatic. Right Ear: External ear normal.      Left Ear: External ear normal.      Nose: Nose normal.      Mouth/Throat:      Pharynx: Oropharynx is clear. Eyes:      Extraocular Movements: Extraocular movements intact. Pupils: Pupils are equal, round, and reactive to light. Cardiovascular:      Rate and Rhythm: Normal rate and regular rhythm. Pulses: Normal pulses.       Heart sounds: Murmur heard. No friction rub. No gallop. Comments: Loud systolic ejection murmur 5/6   Pulmonary:      Effort: Pulmonary effort is normal. No respiratory distress. Breath sounds: Normal breath sounds. No wheezing, rhonchi or rales. Abdominal:      General: Abdomen is flat. There is no distension. Palpations: Abdomen is soft. Tenderness: There is no abdominal tenderness. There is no guarding or rebound. Musculoskeletal:         General: No swelling. Normal range of motion. Cervical back: Normal range of motion. No rigidity or tenderness. Right lower leg: Edema present. Left lower leg: Edema present. Skin:     General: Skin is warm and dry. Capillary Refill: Capillary refill takes less than 2 seconds. Neurological:      General: No focal deficit present. Mental Status: She is alert. Cranial Nerves: No cranial nerve deficit. Sensory: No sensory deficit. Motor: Weakness present.       Comments: RLE 5/5, LLE 4/5 due to pain, NVI   Psychiatric:         Mood and Affect: Mood normal.         Invasive Devices     Peripheral Intravenous Line  Duration           Peripheral IV 07/10/23 Distal;Left;Upper;Ventral (anterior) Arm <1 day              Lab Results:   Results: I have personally reviewed all pertinent laboratory/tests results, BMP/CMP:   Lab Results   Component Value Date    SODIUM 129 (L) 07/10/2023    K 4.4 07/10/2023    CL 99 07/10/2023    CO2 22 07/10/2023    BUN 21 07/10/2023    CREATININE 1.12 07/10/2023    CALCIUM 8.7 07/10/2023    EGFR 41 07/10/2023    and CBC:   Lab Results   Component Value Date    WBC 21.58 (H) 07/10/2023    HGB 9.8 (L) 07/10/2023    HCT 29.6 (L) 07/10/2023    MCV 89 07/10/2023     07/10/2023    RBC 3.33 (L) 07/10/2023    MCH 29.4 07/10/2023    MCHC 33.1 07/10/2023    RDW 13.7 07/10/2023    MPV 9.7 07/10/2023    NRBC 0 07/10/2023       Imaging Results: I have personally reviewed pertinent films in PACS  Chest Xray(s): N/A   FAST exam(s): N/A   CT Scan(s): positive for acute findings: S2 fracture   Additional Xray(s): negative for acute findings     Other Studies: n/a    Code Status: No Order  Advance Directive and Living Will:      Power of :    POLST:    I have spent 25 minutes with Patient and family today in which greater than 50% of this time was spent in counseling/coordination of care regarding Diagnostic results, Impressions, Documenting in the medical record, Reviewing / ordering tests, medicine, procedures  , Obtaining or reviewing history   and Communicating with other healthcare professionals .

## 2023-07-11 NOTE — CONSULTS
Consultation - [address] Gamal Heller. y.o. female MRN: [id number]  Unit/Bed#: [facility] 607-01 Encounter: [id number]      Assessment/Plan     Ambulatory dysfunction with fall  -reportedly mechanical fall about two weeks ago and again yesterday   -(-) head strike (-) loss of consciousness with either fall   -injuries as outlined below  -Does not require use of assist device for ambulation at baseline but has been using walker since initial fall two weeks ago  -remote hx of mechanical fall none in past six months   -remains high risk future falls due to age, hx fall, impaired vision, deconditioning/debility and unfamiliar environment   -encourage good body mechanics and assist with all transfers  -keep personal items and call bell close to prevent reaching  -maintain environment free of fall hazards  -encourage appropriate footwear and adequate lighting at all times when out of bed  -consider home fall risk assessment and personal fall alert system if returning home  -PT and OT pending    Closed fracture of sacrum  -s/p fall as outlined above  -CT abdomen pelvis without contrast 7/11 reports S2 fracture possibly acute  -Conservative management recommended by Orthopedics - WBAT with ASA for DVT PPx  -Continue acute pain control  -recommend checking vitamin D to ensure adequate stores and supplement for any needs unable to be met by diet alone   -PT/OT following     Acute pain due to trauma  -recommend pain control per Geriatric pain protocol:  Tylenol 975mg Q8H scheduled  Roxicodone 2.5mg Q4H PRN moderate pain  Roxicodone 5mg Q4H PRN severe pain  Dilaudid 0.2mg Q4H PRN  -consider adjuncts such as lidocaine patch topically  -encourage addition of non-pharmacologic pain treatment including ice and frequent repositioning  -recommend bowel regimen to prevent and treat constipation due to increased risk with acute pain and opiate pain medications    UTI  -UA on admit positive for bacteria, leukocytes and nitrites, pt with leukocytosis  -currently on Rocephin, regimen adjustment pending final culture results     Hyponatremia   -[Na] low at 129 on presentation and persists today  -salt tabs started on admit, monitor levels closely, avoid rapid and drastic fluctuations to avoid overcorrection jayce given unknown chronicity    Aortic stenosis  -asymptomatic, monitored with annual Echo which is currently due as o/p per patient   -continue close o/p f/u with PCP and Cardiology     Hx left nephrectomy  -s/p remote hx nephrectomy for malignancy  -monitor renal function closely, avoid nephrotoxins and continue to optimize hemodynamics    Impaired Vision  -recommend use of corrective lenses at all appropriate times  -encourage adequate lighting and encourage use of assistance with ambulation  -keep personal belongings close to person to avoid reaching  -encourage appropriate footwear at all times  -Consider large font for printed material provided to patient    Impaired mastication  -Requires use of dentures -encourage use at all appropriate times  -ensure meal consistency appropriate for abilities  -continue aspiration precautions    Impaired Hearing  -appears to be mildly impaired of hearing, denies use of hearing aid or assist device at baseline   -encourage providers and caregivers to speak slowly and clearly directly to patient  -minimize background noise to encourage patient engagement  -consider use of hearing amplifier to reduce risk of straining to hear   -consider o/p audiology referral if pt amenable     Cognitive screening  -Alert and fully oriented, denies memory or cognitive concerns  -Reportedly full independent with ADLs and IADLs at baseline  -no prior cognitive testing on record for review  -[facility] 11/7/22 personally viewed, reveals at least moderate chronic microangiopathic changes most notable bilateral temporal areas  -no recent TSH or B12, consider checking with routine labs  -encourage use of sensory assist devices such as glasses at all appropriate times to reduce risk sensory impairment contributing to isolation confusion and more precipitous cognitive decline   -encourage pt remain physically, socially and cognitively active and engaged to maintain cognitive acuity     Deconditioning/debility/frailty  -clinical frailty scale stage IV, vulnerable   -Multifactorial including age, ambulatory dysfunction, aortic stenosis and multiple additional chronic medical co-morbidities in elderly individual with limited physiologic and metabolic reserves  -Encourage well-balanced nutrition, consider supplements between meals if oral intake is poor  -continue optimization of chronic conditions and address acute metabolic derangements as arise  -continue psychosocial supports of pt and family     Delirium precautions  -Patient is high risk of delirium due to age, fall, traumatic injuries, acute pain, hospitalization   -Initiate delirium precautions  -maintain normal sleep/wake cycle  -minimize overnight interruptions, group overnight vitals/labs/nursing checks as possible  -dim lights, close blinds and turn off tv to minimize stimulation and encourage sleep environment in evenings  -ensure that pain is well controlled  -monitor for fecal and urinary retention which may precipitate delirium  -encourage early mobilization and ambulation with assistance as cleared to safely do so  -provide frequent reorientation and redirection as indicated and appropriate   -encourage hydration and nutrition   -redirect unwanted behaviors as first line    Home medication review   [facility] pharmacy [phone]:    Losartan 100mg daily  Omeprazole 20mg daily     Care coordination: rounded with Kary Alba (RN)    History of Present Illness   Physician Requesting Consult:  Mckayla Soliman,*  Reason for Consult / Principal Problem: Fall   Hx and PE limited by: N/A  Additional history obtained from: Chart review and patient evaluation    HPI: Jethro Joyce Samir Delaney is a 80y.o. year old female with aortic stenosis, ambulatory dysfunction, phenomenon, hypertension, impaired vision, impaired mastication, CKD-III and CMC arthritis who is admitted to the trauma service with ambulatory dysfunction and fall found to have S2 fracture on admission imaging. She explains about 2 weeks ago she sustained a mechanical fall at home in the kitchen in which she tripped and fell unable to get up and as she did not want to bother neighbors for help due to time of day she crawled around the floor to get to the phone to call 911 for EMS to assist with a lift. She did not immediately seek medical attention but did eventually present to urgent care for evaluation since which time she states that she has been using a walker which she does not use at baseline. Yesterday when "getting up from the hopper" she was attempting to use the walker when it went out from under her causing her to fall. She denies head strike or loss of consciousness but again was unable to get up without assistance with called EMS for assist this time amenable to coming to the hospital she presented to the ED where admission imaging revealed acute sacral fracture. She reports pain in her sacral region and left hip primarily with mobilization but tolerable at rest.     Mariel Saravia reports that prior to admission she was residing home alone, she reports independence with ADLs and iADLs at baseline and denies memory or cognitive concerns. She denies use of assist device at baseline prior to two weeks ago but does endorse history of prior mechanical falls. She requires use of glasses for reading, is mildly hard of hearing but does not use hearing aid, she does use partial denture which she states did not accompany her to the hospital.     Inpatient consult to Gerontology  Consult performed by: Chavez Arreguin DO  Consult ordered by: Timothy Godfrey PA-C        Review of Systems   Constitutional: Negative.   Negative for appetite change, chills and fever. HENT: Positive for dental problem (plate denture) and hearing loss (mild and does not use hearing aid). Eyes: Positive for visual disturbance (glasses for reading). Respiratory: Negative. Negative for shortness of breath. Cardiovascular: Positive for leg swelling (LLE since falling about one week ago). Gastrointestinal: Negative. Genitourinary: Negative. Negative for difficulty urinating, dysuria and frequency. Musculoskeletal: Positive for arthralgias and gait problem. Skin: Negative. Neurological: Positive for weakness (generalized ). Negative for dizziness, light-headedness, numbness and headaches. Hematological: Negative. Psychiatric/Behavioral: Negative. Negative for confusion and sleep disturbance. All other systems reviewed and are negative. Historical Information   Past Medical History:   Diagnosis Date   • Cancer ([address])     Cervical, Kidney, melanoma Per MRI Screening form 2/5/19     Past Surgical History:   Procedure Laterality Date   • NEPHRECTOMY Left      Social History   Social History     Substance and Sexual Activity   Alcohol Use None     Social History     Substance and Sexual Activity   Drug Use Not Currently     Social History     Tobacco Use   Smoking Status Never   Smokeless Tobacco Never     Family History:   Family History   Problem Relation Age of Onset   • Brain cancer Other      Meds/Allergies   all current active meds have been reviewed    No Known Allergies    Objective     Intake/Output Summary (Last 24 hours) at 7/11/2023 1122  Last data filed at 7/11/2023 0409  Gross per 24 hour   Intake 1550 ml   Output --   Net 1550 ml     Invasive Devices     Peripheral Intravenous Line  Duration           Peripheral IV 07/10/23 Distal;Left;Upper;Ventral (anterior) Arm <1 day          Drain  Duration           External Urinary Catheter <1 day              Physical Exam  Vitals and nursing note reviewed.    Constitutional: General: She is not in acute distress. Appearance: She is not toxic-appearing. Comments: Elderly female appears younger than stated age    HENT:      Head: Normocephalic and atraumatic. Nose: Nose normal.      Mouth/Throat:      Mouth: Mucous membranes are dry. Comments: Missing multiple teeth, no denture in place   Eyes:      General: No scleral icterus. Right eye: No discharge. Left eye: No discharge. Conjunctiva/sclera: Conjunctivae normal.      Comments: Not wearing glasses    Neck:      Comments: Trachea midline, phonation normal  Cardiovascular:      Rate and Rhythm: Normal rate. Pulses: Normal pulses. Heart sounds: Murmur (systolic ) heard. Pulmonary:      Effort: Pulmonary effort is normal. No respiratory distress. Breath sounds: No wheezing. Comments: Sat well on room air   Abdominal:      General: There is no distension. Palpations: Abdomen is soft. Tenderness: There is no abdominal tenderness. Musculoskeletal:      Cervical back: Neck supple. Right lower leg: Edema (lower leg with mild ecchymosis ) present. Left lower leg: No edema. Skin:     General: Skin is warm and dry. Comments: Thin and friable    Neurological:      Mental Status: She is alert. Comments: Awake and alert, oriented, answers questions appropriately, speech clear and fluent    Psychiatric:         Mood and Affect: Mood normal.         Behavior: Behavior normal.       Lab Results:     I have personally reviewed pertinent lab results. I have personally reviewed the pertinent imaging study reports in PACS.     Therapies:   PT: pending   OT: pending     VTE Prophylaxis: Heparin    Code Status: Level 1 - Full Code  Advance Directive and Living Will:      Power of :    POLST:      Family and Social Support: daughter     Goals of Care: warm up and pain control

## 2023-07-11 NOTE — PHYSICAL THERAPY NOTE
PHYSICAL THERAPY EVALUATION          Patient Name: Ashlee Warner  ZLMYH'R Date: 7/11/2023 07/11/23 0915   Note Type   Note type Evaluation   Pain Assessment   Pain Assessment Tool 0-10   Pain Score No Pain   Restrictions/Precautions   Weight Bearing Precautions Per Order Yes   RLE Weight Bearing Per Order WBAT   LLE Weight Bearing Per Order WBAT   Other Precautions Telemetry; Fall Risk   Home Living   Type of [address] One level;Performs ADLs on one level;Elevator  (2nd floor apartment with elevator. 0 DUNIA)   Bathroom Shower/Tub Tub/shower unit   Bathroom Toilet Standard   Bathroom Equipment Grab bars in shower   [address]   Additional Comments Pt reports using a walker at baseline. Prior Function   Level of [place] Independent with ADLs; Independent with functional mobility; Independent with IADLS   Lives With (S)  Alone   Receives Help From Family; Neighbor  (Pt reports her dtr lives nearby and comes in daily to check on her.)   IADLs Independent with meal prep; Independent with medication management   Falls in the last 6 months 1 to 4   Vocational Retired   [employer]   Orientation Level Oriented X4   Memory Within functional limits   Following Commands Follows multistep commands with increased time or repetition   Comments Pt with good safety awareness and insight to current condition. RLE Assessment   RLE Assessment WFL   Strength RLE   RLE Overall Strength 3+/5   LLE Assessment   LLE Assessment WFL   Strength LLE   LLE Overall Strength 3+/5   Bed Mobility   Supine to Sit 4  Minimal assistance   Additional items Assist x 1; Increased time required;Verbal cues   Transfers   Sit to Stand 4  Minimal assistance   Additional items Assist x 1; Increased time required;Verbal cues   Stand to Sit 4  Minimal assistance   Additional items Assist x 1; Increased time required;Verbal cues   Toilet transfer 4  Minimal assistance   Additional items Assist x 1; Increased time required;Verbal cues; Commode   Ambulation/Elevation   Gait pattern Improper Weight shift; Forward Flexion;Decreased foot clearance; Step to;Ataxia; Decreased hip extension;Decreased toe off;Decreased heel strike   Gait Assistance 4  Minimal assist   Additional items Assist x 1;Verbal cues   Assistive Device Rolling walker   Distance 3' bed to commode, 3' commode to chair   Ambulation/Elevation Additional Comments RW used for transfers and ambulation. Balance   Static Sitting Fair +   Dynamic Sitting Fair   Static Standing Fair -   Dynamic Standing Poor +   Ambulatory Poor   Activity Tolerance   Activity Tolerance Patient tolerated treatment well   Medical Staff Made Aware Carlo Moses PT; Pao Chang; Yuliya Hicks. Seen with OT d/t pt's medical status. Nurse Made Aware Okay per RN   Assessment   Prognosis Good   Problem List Decreased strength;Decreased range of motion; Impaired balance;Decreased endurance;Decreased mobility; Decreased coordination   Assessment Zahida Styles is a 80 y.o. female admitted to \[facility]\"" on 7/10/23 with primary Dx of generalized weakness s/p fall. Additional active problems include S2 fracture, ambulatory dysfunction, UTI. Per ortho, pt is WBAT. Pt  has a past medical history of Cancer ([address]). Pt A&O x4 and denies c/o pain. PT consulted for evaluation of pt's functional mobility and D/C needs. Pt has a high medical complexity due to WBS, fall risk, PMH, ongoing telemetry monitoring, trending lab values, increased assistance required, decrease in activity tolerance from baseline, pending diagnostic imaging. PTA, pt was independent with ADLs, IADLs & functional mobility. Pt reports using a walker for ambulation at baseline.  Upon evaluation, pt is Emily x1 for bed mobility, transfers & ambulation with RW. Pt's current functional limitations include decreased LE strength and ROM, WBS, fall risk, decreased endurance, impaired balance, decreased IND. Following PT eval, pt was left OOB in chair with phone, call bell, and personal belongings in reach. Pt educated on the importance of calling for assistance when wanting to get out of the chair d/t increased fall risk. Pt verbalized understanding. The patient's AM-PAC Basic Mobility Inpatient Short Form Raw Score is 16. A Raw score of less than or equal to 16 suggests the patient may benefit from discharge to post-acute rehabilitation services. Please also refer to the recommendation of the Physical Therapist for safe discharge planning. Due to pt's current medical and functional status, skilled acute care PT is needed to address the following goals listed below. PT recommends D/C to rehab once medically cleared. Barriers to Discharge Decreased caregiver support  (Lives alone)   Goals   Patient Goals To feel better   STG Expiration Date 07/21/23   Short Term Goal #1 STG #1) Pt will improve bed mobility to supervision to improve independence with basic mobility. STG #2) Pt will improve transfers to supervision for safe discharge to lesser level of care and to decrease caregiver burden. STG #3) Pt will improve ambulation to 150' at supervision level with LRAD to increase endurance and improve functional mobility. STG #4) Pt will improve static and dynamic balance by half a grade for improved safety and independence with transfers and ambulation. STG #5) Pt will improve LE strength by half a grade to increase independence with functional mobility and decrease fall risk. Plan   Treatment/Interventions ADL retraining;Elevations; Functional transfer training;LE strengthening/ROM; Endurance training; Therapeutic exercise;Equipment eval/education;Patient/family training;Bed mobility;Gait training; Compensatory technique education   PT Frequency 3-5x/wk   Recommendation   PT Discharge Recommendation Post acute rehabilitation services   Additional Comments Pt has a walker. AM-PAC Basic Mobility Inpatient   Turning in Flat Bed Without Bedrails 3   Lying on Back to Sitting on Edge of Flat Bed Without Bedrails 3   Moving Bed to Chair 3   Standing Up From Chair Using Arms 3   Walk in Room 3   Climb 3-5 Stairs With Railing 1   Basic Mobility Inpatient Raw Score 16   Basic Mobility Standardized Score 38.32   Highest Level Of Mobility   -[facility] Goal 5: Stand one or more mins   -[facility] Achieved 4: Move to chair/commode   Modified Adam Scale   Modified Bailey Scale 4   Barthel Index   Feeding 10   Bathing 0   Grooming Score 0   Dressing Score 5   Bladder Score 0   Bowels Score 0   Toilet Use Score 5   Transfers (Bed/Chair) Score 5   Mobility (Level Surface) Score 10   Stairs Score 0   Barthel Index Score 35   Livan Kam, SPT

## 2023-07-11 NOTE — ASSESSMENT & PLAN NOTE
- Sacral fracture, present on admission.  - Status post fall  on 7/10. - Appreciate Orthopedic surgery evaluation, recommendations and interventions as noted. - Non-operative management  - Maintain WBAT B/L LE  - Monitor neurovascular exam.  - Continue multimodal analgesic regimen.  - Continue DVT prophylaxis. - PT and OT evaluation and treatment as indicated. - Outpatient follow up with Orthopedic surgery for re-evaluation.

## 2023-07-11 NOTE — CONSULTS
89 [facility] y.o. female MRN: [id number]  Unit/Bed#: Cat Scan      Chief Complaint:   Back pain following fall    HPI:  80 y.o. female community ambulator at baseline who and presents ambulates with a walker following a fall 2 weeks ago who presents following a fall from her toilet while attempting to use walker. She is not on anticoagulation at this time. She denies any history of diabetes or smoking. .    Review Of Systems:   · Skin: Normal  · Neuro: See HPI  · Musculoskeletal: See HPI  · 14 point review of systems negative except as stated above     Past Medical History:   Past Medical History:   Diagnosis Date   • Cancer ([address])     Cervical, Kidney, melanoma Per MRI Screening form 2/5/19       Past Surgical History:   History reviewed. No pertinent surgical history. Family History:  Family history reviewed and non-contributory  History reviewed. No pertinent family history.     Social History:  Social History     Socioeconomic History   • Marital status: /Civil Union     Spouse name: None   • Number of children: None   • Years of education: None   • Highest education level: None   Occupational History   • None   Tobacco Use   • Smoking status: Never   • Smokeless tobacco: Never   Vaping Use   • Vaping Use: None   Substance and Sexual Activity   • Alcohol use: None   • Drug use: Not Currently   • Sexual activity: Not Currently   Other Topics Concern   • None   Social History Narrative   • None     Social Determinants of Health     Financial Resource Strain: Not on file   Food Insecurity: Not on file   Transportation Needs: Not on file   Physical Activity: Not on file   Stress: Not on file   Social Connections: Not on file   Intimate Partner Violence: Not on file   Housing Stability: Not on file       Allergies:   No Known Allergies        Labs:  0   Lab Value Date/Time    HCT 29.6 (L) 07/10/2023 2239    HCT 42.5 02/04/2020 1023    HCT 40.9 11/28/2018 0831    HGB 9.8 (L) 07/10/2023 2239 HGB 13.2 02/04/2020 1023    HGB 12.9 11/28/2018 0831    WBC 21.58 (H) 07/10/2023 2239    WBC 8.70 02/04/2020 1023    WBC 9.18 11/28/2018 0831       Meds:    Current Facility-Administered Medications:   •  acetaminophen (TYLENOL) tablet 650 mg, 650 mg, Oral, Q6H [address], Marco Antonio Man MD  •  [START ON 7/12/2023] cefTRIAXone (ROCEPHIN) 1,000 mg in dextrose 5 % 50 mL IVPB, 1,000 mg, Intravenous, Q24H, Marco Antonio Man MD  •  heparin (porcine) subcutaneous injection 5,000 Units, 5,000 Units, Subcutaneous, Q8H [address] **AND** Platelet count, , , Once, [employer], DO  •  HYDROmorphone HCl (DILAUDID) injection 0.2 mg, 0.2 mg, Intravenous, Q4H PRN, Marco Antonio Man MD  •  losartan (COZAAR) tablet 100 mg, 100 mg, Oral, Daily, Marco Antonio Man MD  •  oxyCODONE (ROXICODONE) IR tablet 5 mg, 5 mg, Oral, Q6H PRN, Marco Antonio Man MD  •  oxyCODONE (ROXICODONE) split tablet 2.5 mg, 2.5 mg, Oral, Q6H PRN, Marco Antonio Man MD  •  pantoprazole (PROTONIX) EC tablet 40 mg, 40 mg, Oral, Early Morning, Marco Antonio Man MD  •  senna (SENOKOT) tablet 17.2 mg, 2 tablet, Oral, Daily, Marco Antonio Man MD    Current Outpatient Medications:   •  losartan (COZAAR) 100 MG tablet, Take 100 mg by mouth daily, Disp: , Rfl:   •  omeprazole (PriLOSEC) 20 mg delayed release capsule, Take 20 mg by mouth daily Take before a meal, Disp: , Rfl:   •  predniSONE 10 mg tablet, TAKE 4 TABLETS FOR 2 DAYS, 3 TABLETS FOR 2 DAYS, 2 TABLETS FOR 2 DAYS, 1 TABLET FOR 2 DAYS THEN OFF, Disp: , Rfl:     Blood Culture:   No results found for: "BLOODCX"    Wound Culture:   No results found for: "WOUNDCULT"    Ins and Outs:  I/O last 24 hours:   In: 1550 [I.V.:500; IV Piggyback:1050]  Out: -           Physical Exam:   /60 (BP Location: Right arm)   Pulse 96   Temp 100.4 °F (38 °C) (Oral)   Resp 20   SpO2 97%   Gen: No acute distress, resting comfortably in bed  HEENT: Eyes clear, moist mucus membranes, hearing intact  Respiratory: No audible wheezing or stridor  Cardiovascular: Well Perfused peripherally, 2+ distal pulse  Abdomen: nondistended, no peritoneal signs  Musculoskeletal: Sacrum  · Skin: normal  · TTP over posterior sacrum  · SILT s/s/sp/dp/t. · Motor intact 5/5 strength with hip flexion/extension, knee flexion/extension, ankle dorsi/plantar flexion, EHL/FHL  · 2+ DP/PT pulse  · Musculature is soft and compressible, no pain with passive stretch  · Leg lengths equal    Tertiary: no tenderness over all other joints/long bones as except already stated. Radiology:   I personally reviewed the films. A/P pelvis demonstrates S2 fracture. CT of abdomen and [facility]    Assessment:  89 y. o.female status post fall with an S2 fracture. Plan:   · Conservative management  · Weightbearing as tolerated  · Pelvic inlet/outlet views for followup purposes.   · Dispo per primary  · DVT prophylaxis ASA 81 mg twice daily  · Follow-up 4 weeks    Katie Mayfield MD

## 2023-07-11 NOTE — ASSESSMENT & PLAN NOTE
- Patient with acute hyponatremia, present on presentation.   - Hyponatremia worsening as of 7/12/2023 with sodium down to 126 from 129.   - Patient not currently on diuretic therapy, did not receive DDAVP, and appears euvolemic to possibly mildly hypovolemic.  - Urine studies and serum osmol reviewed. - Continue current fluid restriction 1.5 L fluid intake per day as well as salt tabs 3 times daily. - Continue to monitor sodium level with repeat BMP at 2 PM on 7/12/2023.

## 2023-07-11 NOTE — PLAN OF CARE
Problem: PHYSICAL THERAPY ADULT  Goal: Performs mobility at highest level of function for planned discharge setting. See evaluation for individualized goals. Description: Treatment/Interventions: ADL retraining, Elevations, Functional transfer training, LE strengthening/ROM, Endurance training, Therapeutic exercise, Equipment eval/education, Patient/family training, Bed mobility, Gait training, Compensatory technique education          See flowsheet documentation for full assessment, interventions and recommendations. Note: Prognosis: Good  Problem List: Decreased strength, Decreased range of motion, Impaired balance, Decreased endurance, Decreased mobility, Decreased coordination  Assessment: Kedar Pitt is a 80 y.o. female admitted to [facility] on 7/10/23 with primary Dx of generalized weakness s/p fall. Additional active problems include S2 fracture, ambulatory dysfunction, UTI. Per ortho, pt is WBAT. Pt  has a past medical history of Cancer ([address]). Pt A&O x4 and denies c/o pain. PT consulted for evaluation of pt's functional mobility and D/C needs. Pt has a high medical complexity due to WBS, fall risk, PMH, ongoing telemetry monitoring, trending lab values, increased assistance required, decrease in activity tolerance from baseline, pending diagnostic imaging. PTA, pt was independent with ADLs, IADLs & functional mobility. Pt reports using a walker for ambulation at baseline. Upon evaluation, pt is Emily x1 for bed mobility, transfers & ambulation with RW. Pt's current functional limitations include decreased LE strength and ROM, WBS, fall risk, decreased endurance, impaired balance, decreased IND. Following PT eval, pt was left OOB in chair with phone, call bell, and personal belongings in reach. Pt educated on the importance of calling for assistance when wanting to get out of the chair d/t increased fall risk. Pt verbalized understanding.  The patient's AM-PAC Basic Mobility Inpatient Short Form Raw Score is 16. A Raw score of less than or equal to 16 suggests the patient may benefit from discharge to post-acute rehabilitation services. Please also refer to the recommendation of the Physical Therapist for safe discharge planning. Due to pt's current medical and functional status, skilled acute care PT is needed to address the following goals listed below. PT recommends D/C to rehab once medically cleared. Barriers to Discharge: Decreased caregiver support (Lives alone)     PT Discharge Recommendation: Post acute rehabilitation services    See flowsheet documentation for full assessment.
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
## 2023-07-12 NOTE — PLAN OF CARE
Problem: OCCUPATIONAL THERAPY ADULT  Goal: Performs self-care activities at highest level of function for planned discharge setting. See evaluation for individualized goals. Description: Treatment Interventions: ADL retraining, Functional transfer training, Endurance training, Patient/family training, Equipment evaluation/education, Compensatory technique education, Energy conservation, Activityengagement          See flowsheet documentation for full assessment, interventions and recommendations. Note: Limitation: Decreased ADL status, Decreased endurance, Decreased self-care trans, Decreased high-level ADLs  Prognosis: Good  Assessment: Pt seen for OT session with focus on functional transfers/mobility, grooming, UB/LB dressing, and toileting. Pt pleasant and cooperative t/o treatment session. Pt continues to perform functional transfers and mobility with RW at min A x1. Verbal cues used for safety and proper hand placement. Pt continues to perform toileting at min A x1 with increased time. Pt participated in grooming activities such as combing hair and washing face with supervision set-up in chair. Pt able to don/doff socks with min A for steadying on BLE and required mod A to thread BLE into briefs. Improvement noted with UB dressing to supervision level. Pt required increased time and verbal cues to complete all treatment activities. Overall, pt displayed G tolerance to treatment session. The patient's raw score on the AM-PAC Daily Activity Inpatient Short Form is 19. A raw score of greater than or equal to 19 suggests the patient may benefit from discharge to home. Please refer to the recommendation of the Occupational Therapist for safe discharge planning. Pt would continue to benefit from skilled occupational therapy services to continue addressing goals.  OT continues to recommend inpatient rehab upon d/c.     OT Discharge Recommendation: Post acute rehabilitation services

## 2023-07-12 NOTE — CASE MANAGEMENT
Case Management Discharge Planning Note    Patient name Kaia Salazar  Location [address]/[facility] 679-81 MRN [id number]  : 3/12/1928 Date 2023       Current Admission Date: 7/10/2023  Current Admission Diagnosis:Generalized weakness   Patient Active Problem List    Diagnosis Date Noted   • Fall 2023   • Generalized weakness 2023   • Sepsis due to gram-negative UTI ([address]) 2023   • Acute cystitis without hematuria 2023   • Closed nondisplaced zone II fracture of sacrum ([address]) 2023   • Hyponatremia 2023   • Anemia 2023   • H/O left nephrectomy 2023   • Raynaud phenomenon 2023   • Arthritis of carpometacarpal (CMC) joint of right thumb 2022   • Primary osteoarthritis of right wrist 2022   • Closed fracture of right proximal humerus 11/10/2022   • Primary hypertension 2014      LOS (days): 1  Geometric Mean LOS (GMLOS) (days): 2.80  Days to GMLOS:1.8     OBJECTIVE:  Risk of Unplanned Readmission Score: 11.15         Current admission status: Inpatient   Preferred Pharmacy:   [facility] [address] [facility], [address] [place] [address] Street [address]  Phone: [phone] Fax: [phone]    Primary Care Provider: Yari Brannon MD    Primary Insurance: [facility] [address] [facility]:     [address] [place] [address] Number: [id number]

## 2023-07-12 NOTE — CASE MANAGEMENT
[facility] has received approved authorization from insurance: [employer] in by Rep: Markus P#  [phone]  Authorization received for: SNF  Facility: [address] [place] [address] #: [id number]  Start of Care: 07/12/2023  Next Review Date: 07/14/2023  Continued Stay Care Coordinator: Gavino Javier.   P#: [phone]  Submit next review to: Fax. [phone]  Care Manager notified: Sanju Andrade

## 2023-07-12 NOTE — ASSESSMENT & PLAN NOTE
- Status post fall with the below noted injuries. - Fall precautions. - Geriatric Medicine consultation for evaluation, medication review and recommendations.  - PT and OT evaluation and treatment as indicated. - Case Management consultation for disposition planning.

## 2023-07-12 NOTE — APP STUDENT NOTE
ANDREW STUDENT  Inpatient Progress Note for TRAINING ONLY  Not Part of Legal Medical Record       Progress Note - Mesha Gutierrez 80 y.o. female MRN: [id number]    Unit/Bed#: [facility] 607-01 Encounter: [id number]      Assessment/Plan:  1. Fall  · Jose Menjivar off toilet onto buttocks  · History of multiple falls. Last fall 2 weeks ago resulting in left hip pain without any pelvic or hip fractures. ·  PT/OT consult - recommend post acute rehab. Up out of bed with rolling walking with assistance x1.  2. Nondisplaced zone III fracture of sacrum  · Neurovascularly intact  · Ortho consult - Nonop. Recommend weight bearing as tolerated to bilateral lower extremities. Follow up outpatient with ortho. · Continue pain control  3. Anemia   · Upon admission secondary to fall, stable  · Hgb 7/10/23 - 9.8  · Hgb 7/12/23 - 9.4  · Continue to monitor   4. Acute cystitis without hematuria  · UA 7/10/23 - moderate blood, positive nitrites, large leuks  · Fevers. Dysuria, urianry frequency   · Continue rocephin x3days, started 7/10/23  5. Hyponatremia  · Upon admission 7/10/23 Na 129  · 7/11/23 Serum osmol low 265  · 7/11/23 Urine osmol normal 258  · 7/12/23 Na 126  · Received 1g salt tab yesterday 7/11/23 TIB, continue  · On 1500mL fluid restriction, discontinue  · Resuscitate with IV fluids  · Continue to monitor  6. Primary hypertension  · Continue home HTN meds  · BPs controlled during admission   7. 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 Only has pain when up and ambulating to sacrum and left hip areas. Ambulating with walker with assistance x1. Eating and drinking without difficulties. Urinating without any issues. No dysuria or hematuria. Patient on miralax for constipation. Last BM yesterday morning, loose and incontinent. No new complaints or concerns at this time.      Objective:     Vitals: Blood pressure 121/55, pulse 91, temperature 98.6 °F (37 °C), resp. rate 16, height 5' (1.524 m), weight 54.4 kg (120 lb), SpO2 96 %. ,Body mass index is 23.44 kg/m². Intake/Output Summary (Last 24 hours) at 7/12/2023 1023  Last data filed at 7/12/2023 1800  Gross per 24 hour   Intake 320 ml   Output 700 ml   Net -380 ml       Physical Exam:   General: Alert and oriented x3  CV: Regular rate and rhythm. Murmur heard on exam.  PV: Radial and DP pulses 2+ bilaterally. Pulm: Clear to auscultation bilaterally. No wheezes, rales, or rhonchi. Abd: Soft and nontender. Skin: Warm and dry. No rashes or bruising. No edema noted. MSK:  Slight tenderness to palpation of midline sacrum region. No tenderness to bilateral hips or  Lumbar region. 4/5 strength in bilateral lower extremities. Neuro: GCS 15. Sensation intact to crude touch. Invasive Devices     Peripheral Intravenous Line  Duration           Peripheral IV 07/11/23 Left Forearm <1 day          Drain  Duration           External Urinary Catheter 1 day                 Lab, Imaging and other studies: I have personally reviewed pertinent reports.      VTE Pharmacologic Prophylaxis: Heparin  VTE Mechanical Prophylaxis: sequential compression device

## 2023-07-12 NOTE — ASSESSMENT & PLAN NOTE
- Patient with chronic history of hypertension.  - Continue current medication regimen and adjust as indicated. - Outpatient follow-up routine.

## 2023-07-12 NOTE — PROGRESS NOTES
Progress Note - Geriatric Medicine   [facility] 80 y.o. female MRN: [id number]  [facility] Encounter: [id number]      Assessment/Plan:    Ambulatory dysfunction with fall  -reportedly mechanical fall about two weeks ago and on 7/10  -injuries as outlined below  -Does not require use of assist device for ambulation at baseline but has been using walker since initial fall two weeks ago  -remote hx of mechanical fall none in past six months   -remains high risk future falls due to age, hx fall, impaired vision, deconditioning/debility and unfamiliar environment   -encourage good body mechanics and assist with all transfers  -keep personal items and call bell close to prevent reaching  -maintain environment free of fall hazards  -recommend home fall risk assessment and personal fall alert system on returning home following rehab   -PT and OT following      Closed fracture of sacrum  -s/p fall as outlined above  -CT abdomen pelvis without contrast 7/11 report reviewed - reports S2 fracture possibly   -Conservative management recommended by Orthopedics - WBAT with ASA for DVT PPx  -Continue multimodal pain control   -recommend checking vitamin D to ensure adequate stores and supplement for any needs unable to be met by diet alone   -PT/OT following      Acute pain due to trauma  -continue acute multimodal pain control   -consider adjuncts such as lidocaine patch topically  -encourage addition of non-pharmacologic pain treatment including ice and frequent repositioning  -recommend bowel regimen to prevent and treat constipation due to increased risk with acute pain and opiate pain medications     UTI  -UA on admit positive for bacteria, leukocytes and nitrites, pt with leukocytosis  -currently on Rocephin, monitor temp and wbc curves      Hyponatremia   -[Na] low at 129 on presentation worsened today with Na 126 despite initiation sodium tabs   -monitor levels closely, avoid rapid and drastic fluctuations to avoid overcorrection     Aortic stenosis  -asymptomatic, monitored with annual Echo which is currently due as o/p per patient   -continue close o/p f/u with PCP and Cardiology      Hx left nephrectomy  -s/p remote hx nephrectomy for malignancy  -monitor renal function closely, avoid nephrotoxins and continue to optimize hemodynamics     Impaired Vision  -recommend use of corrective lenses at all appropriate times  -Consider large font for printed material provided to patient     Impaired mastication  -Requires use of dentures -encourage use at all appropriate times  -ensure meal consistency appropriate for abilities  -continue aspiration precautions     Impaired Hearing  -appears to be mildly impaired of hearing, denies use of hearing aid or assist device at baseline   -encourage providers and caregivers to speak slowly and clearly directly to patient  -minimize background noise to encourage patient engagement  -consider use of hearing amplifier to reduce risk of straining to hear   -consider o/p audiology referral if pt amenable      Cognitive screening  -Alert and fully oriented, denies memory or cognitive concerns  -Reportedly full independent with ADLs and IADLs at baseline  -no prior cognitive testing on record for review  -[facility] 11/7/22 personally viewed, reveals at least moderate chronic microangiopathic changes most notable bilateral temporal areas  -no recent TSH or B12, consider checking with routine labs  -encourage use of sensory assist devices such as glasses at all appropriate times to reduce risk sensory impairment contributing to isolation confusion and more precipitous cognitive decline   -encourage pt remain physically, socially and cognitively active and engaged to maintain cognitive acuity      Frailty syndrome in geriatric patient   -clinical frailty scale stage IV, vulnerable   -Multifactorial including age, ambulatory dysfunction, aortic stenosis and multiple additional chronic medical co-morbidities in elderly individual with limited physiologic and metabolic reserves  -Encourage well-balanced nutrition  -continue optimization of chronic conditions and address acute metabolic derangements as arise  -continue psychosocial supports of pt and family      High risk developing delirium  -Patient is high risk of delirium due to age, fall, traumatic injuries, acute pain, hospitalization, hospital env   -continue delirium precautions  -maintain normal sleep/wake cycle  -ensure that pain is well controlled  -monitor for fecal and urinary retention   -provide frequent reorientation and redirection as indicated and appropriate    -redirect unwanted behaviors as first line    Care coordination: rounded with Sanjana Wright (RN)    Subjective:     Sean Dobbs is seen and examined at bedside where she is sitting resting visiting her daughter who is at bedside. She reports lower back and sacral region pain present but tolerable on current regimen, appetite at baseline, no other acute complaints. Review of Systems   Constitutional: Negative. Negative for appetite change, chills and fever. HENT: Negative. Eyes: Negative. Respiratory: Negative. Cardiovascular: Negative. Gastrointestinal: Negative. Negative for nausea and vomiting. Genitourinary: Negative. Musculoskeletal: Positive for back pain and gait problem. Right hip/lower back region    Skin: Negative. Neurological: Negative for dizziness, weakness, light-headedness and headaches. Hematological: Negative. Psychiatric/Behavioral: Negative. Negative for sleep disturbance. All other systems reviewed and are negative. Objective:     Vitals: Blood pressure 118/60, pulse 100, temperature 98.5 °F (36.9 °C), resp. rate 16, height 5' (1.524 m), weight 54.4 kg (120 lb), SpO2 96 %. ,Body mass index is 23.44 kg/m².       Intake/Output Summary (Last 24 hours) at 7/12/2023 1251  Last data filed at 7/12/2023 0823  Gross per 24 hour   Intake 320 ml   Output 500 ml   Net -180 ml     Current Medications: Reviewed    Physical Exam:   Physical Exam  Vitals and nursing note reviewed. Constitutional:       General: She is not in acute distress. Appearance: Normal appearance. She is not toxic-appearing. HENT:      Head: Normocephalic and atraumatic. Nose: Nose normal.      Mouth/Throat:      Mouth: Mucous membranes are moist.   Eyes:      General: No scleral icterus. Right eye: No discharge. Left eye: No discharge. Conjunctiva/sclera: Conjunctivae normal.   Cardiovascular:      Rate and Rhythm: Normal rate. Pulses: Normal pulses. Heart sounds: Murmur heard. Pulmonary:      Effort: Pulmonary effort is normal. No respiratory distress. Abdominal:      General: There is no distension. Palpations: Abdomen is soft. Tenderness: There is no abdominal tenderness. Musculoskeletal:      Cervical back: Neck supple. Right lower leg: No edema. Left lower leg: No edema. Comments: Reduced overall muscle mass   Skin:     General: Skin is warm and dry. Neurological:      Mental Status: She is alert.       Comments: Awake and alert, oriented, answers questions appropriately, speech clear fluent   Psychiatric:         Mood and Affect: Mood normal.         Behavior: Behavior normal.        Invasive Devices     Peripheral Intravenous Line  Duration           Peripheral IV 07/11/23 Left Forearm <1 day          Drain  Duration           External Urinary Catheter 1 day              Lab, Imaging and other studies reviewed: CBC and BMP from 7/12

## 2023-07-12 NOTE — ASSESSMENT & PLAN NOTE
- History of generalized weakness.   - Patient reports some increased weakness in her left lower extremity with ambulation due to left groin pain/injury that occurred approximately 2 weeks ago. - Maintain fall precautions. - Continue PT and OT evaluation and treatment indicated. - Outpatient follow-up with PCP recommended.

## 2023-07-12 NOTE — ASSESSMENT & PLAN NOTE
- Sacral fracture, present on admission.  - Appreciate Orthopedic surgery evaluation and recommendations.   - Non-operative management  - May remain weightbearing as tolerated on the bilateral lower extremities. - Patient reports continued pain in her left groin that affects her gait related to a recent muscular injury.  - Monitor neurovascular exam.  - Continue multimodal analgesic regimen.  - Continue DVT prophylaxis. - PT and OT evaluation and treatment as indicated. - Outpatient follow up with Orthopedic surgery for re-evaluation.

## 2023-07-12 NOTE — ASSESSMENT & PLAN NOTE
- Patient with acute hyponatremia, present on presentation.   - Hyponatremia worsening as of 7/12/2023 with sodium down to 126 from 129.   - Patient not currently on diuretic therapy, did not receive DDAVP, and appears euvolemic to possibly mildly hypovolemic.  - Urine studies and serum osmol reviewed.   - Continue salt tabs 3 times daily, discontinue fluid restriction and resuscitate with NS IV fluids.  - Continue to monitor sodium level with repeat BMP Q6hrs

## 2023-07-12 NOTE — DISCHARGE INSTR - OTHER ORDERS
Ostomy Care:  1. Peel back pouch using push-pull method, may use non-alcohol adhesive remover(Purple and white package). 2. Use warm water only to cleanse skin around the stoma (prince-stomal skin). 3. Make sure all adhesive residue is removed and skin is dry and not oily. 4. Measure stoma size using measuring guide and trace correct measurements onto back of pouch. 5. Then mold/cut the backing of pouch out to correct shape/size. 6. Use 3M no sting barrier film to prep the skin around the stoma. 7. Place pouch over stoma and onto skin. 8. Use warmth of hand to apply gentle pressure to help backing of pouch to adhere well to skin. 9. Empty pouch when 1/3 full. 10. Change pouch every 4-5 days or if signs of leaking. 11. Will continue to follow patient while an inpatient for Ostomy teaching/education.

## 2023-07-12 NOTE — OCCUPATIONAL THERAPY NOTE
Occupational Therapy Progress Note     Patient Name: Lashawn Nowak  KRSXX'X Date: 7/12/2023  Problem List  Principal Problem:    Generalized weakness  Active Problems:    Sepsis due to gram-negative UTI ([address])    Acute cystitis without hematuria    Closed nondisplaced zone II fracture of sacrum (HCC)    Hyponatremia    Anemia    Primary hypertension    H/O left nephrectomy    Raynaud phenomenon    Fall       07/12/23 1056   OT Last Visit   OT Visit Date 07/12/23   Note Type   Note Type Treatment   Pain Assessment   Pain Assessment Tool 0-10   Pain Score No Pain   Hospital Pain Intervention(s) Ambulation/increased activity;Repositioned;Elevated   Restrictions/Precautions   Weight Bearing Precautions Per Order Yes   RLE Weight Bearing Per Order WBAT   LLE Weight Bearing Per Order WBAT   Other Precautions Fall Risk   ADL   Where Assessed Chair   Grooming Assistance 5  Supervision/Setup   Grooming Deficit Setup;Verbal cueing   UB Dressing Assistance 5  Supervision/Setup   UB Dressing Deficit Setup;Verbal cueing   LB Dressing Assistance 3  Moderate Assistance   LB Dressing Deficit Setup;Verbal cueing; Increased time to complete; Thread RLE into underwear; Thread LLE into underwear;Supervision/safety   Toileting Assistance  4  Minimal Assistance   Toileting Deficit Setup;Steadying;Supervison/safety; Increased time to complete; Bedside commode   Bed Mobility   Additional Comments Pt seated in chair prior to OT session. Transfers   Sit to Stand 4  Minimal assistance   Additional items Assist x 1; Increased time required;Verbal cues   Stand to Sit 4  Minimal assistance   Additional items Assist x 1; Increased time required;Verbal cues   Toilet transfer 4  Minimal assistance   Additional items Assist x 1; Increased time required;Verbal cues   Additional Comments Pt left in chair with all needs in reach.    Functional Mobility   Functional Mobility 4  Minimal assistance   Additional Comments Assist x1; Increased time required; Verbal cues   Additional items Rolling walker   Toilet Transfers   Toilet Transfer From Lewis Type To   Toilet Transfer to Standard bedside commode  (Commode placed over toilet)   Toilet Transfer Technique Ambulating   Toilet Transfers Minimal assistance   Cognition   Overall Cognitive Status WFL   Arousal/Participation Alert; Responsive; Cooperative   Attention Within functional limits   Orientation Level Oriented X4   Memory Within functional limits   Following Commands Follows multistep commands with increased time or repetition   Comments Pt pleasant and cooperative. Pt had G insight to self/condition and safety awareness. Assessment   Assessment Pt seen for OT session with focus on functional transfers/mobility, grooming, UB/LB dressing, and toileting. Pt pleasant and cooperative t/o treatment session. Pt continues to perform functional transfers and mobility with RW at min A x1. Verbal cues used for safety and proper hand placement. Pt continues to perform toileting at min A x1 with increased time. Pt participated in grooming activities such as combing hair and washing face with supervision set-up in chair. Pt able to don/doff socks with min A for steadying on BLE and required mod A to thread BLE into briefs. Improvement noted with UB dressing to supervision level. Pt required increased time and verbal cues to complete all treatment activities. Overall, pt displayed G tolerance to treatment session. The patient's raw score on the AM-PAC Daily Activity Inpatient Short Form is 19. A raw score of greater than or equal to 19 suggests the patient may benefit from discharge to home. Please refer to the recommendation of the Occupational Therapist for safe discharge planning. Pt would continue to benefit from skilled occupational therapy services to continue addressing goals.  OT continues to recommend inpatient rehab upon d/c.   Plan   Treatment Interventions ADL retraining;Functional transfer training; Endurance training; Compensatory technique education; Activityengagement   Goal Expiration Date 07/25/23   OT Treatment Day 1   OT Frequency 3-5x/wk   Recommendation   OT Discharge Recommendation Post acute rehabilitation services   AM-PAC Daily Activity Inpatient   Lower Body Dressing 3   Bathing 3   Toileting 3   Upper Body Dressing 3   Grooming 3   Eating 4   Daily Activity Raw Score 19   Daily Activity Standardized Score (Calc for Raw Score >=11) 40.22   AM-PAC Applied Cognition Inpatient   Following a Speech/Presentation 4   Understanding Ordinary Conversation 4   Taking Medications 3   Remembering Where Things Are Placed or Put Away 3   Remembering List of 4-5 Errands 3   Taking Care of Complicated Tasks 3   Applied Cognition Raw Score 20   Applied Cognition Standardized Score 41.76     Raven Vick, OTS

## 2023-07-12 NOTE — PLAN OF CARE
Problem: PHYSICAL THERAPY ADULT  Goal: Performs mobility at highest level of function for planned discharge setting. See evaluation for individualized goals. Description: Treatment/Interventions: ADL retraining, Elevations, Functional transfer training, LE strengthening/ROM, Endurance training, Therapeutic exercise, Equipment eval/education, Patient/family training, Bed mobility, Gait training, Compensatory technique education          See flowsheet documentation for full assessment, interventions and recommendations. Outcome: Progressing  Note: Prognosis: Good  Problem List: Decreased strength, Decreased range of motion, Decreased endurance, Impaired balance, Decreased mobility  Assessment: Pt OOB in chair at time of PT treatment. Pt A&O x4 and denies c/o pain. Pt is Emily x1 for transfers and ambulation with RW. Pt cooperative and motivated to increase mobility. Pt is progressing as indicated by ability to ambulate 10' today compared to 3' x2 at IE. Pt tolerated all therapeutic exercises without c/o increased pain or fatigue. Rest breaks were provided in between sets while pt performed UE ADLs with OT. Pt demonstrates ability to perform seated exercises safely and independently. Pt educated on the importance of continuing to perform exercises during hospital stay. Pt verbalized understanding. Following PT eval, pt was left OOB in chair with phone, call bell, and personal belongings in reach. Pt educated on the importance of calling for assistance when wanting to get out of the chair d/t increased fall risk. Pt verbalized understanding. Pt's current functional limitations include decreased LE strength and ROM, decreased endurance, impaired balance, fall risk, decreased IND. PT continues to recommend D/C to rehab. Will continue to follow as able.   Barriers to Discharge: Decreased caregiver support (Pt home alione)     PT Discharge Recommendation: Post acute rehabilitation services    See flowsheet documentation for full assessment.

## 2023-07-12 NOTE — CASE MANAGEMENT
[address] [place] [address] received request for authorization from Care Manager. Authorization request for: SNF  Facility Name: [facility] NPI: [id number]  Facility MD: Alfonso Cheadle NPI: [id number]  Authorization initiated by contacting insurance: Danford Harada: Phone.  [phone]  Pending Reference #: [id number]  Clinicals submitted via: Fax. [phone]

## 2023-07-12 NOTE — ASSESSMENT & PLAN NOTE
- Patient with UTI/acute cystitis without hematuria, present on presentation.   - UA from 7/11/2023 consistent with acute urinary infection; urine culture results pending.   - Additional/contributing data to support UTI includes fever on admission and leukocytosis. - Completed Rocephin for 3 doses through 7/13/2023.  - Continue to monitor temperature trend and CBC/white blood cell count trend. - Outpatient follow-up with PCP.

## 2023-07-12 NOTE — ASSESSMENT & PLAN NOTE
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   - No evidence of MAXX at this time. - Avoid nephrotoxins and hypotension.  - Outpatient follow-up per routine.

## 2023-07-12 NOTE — WOUND OSTOMY CARE
Wound care consulted for 81 yo [place] female for concerns of pressure injury on buttocks. Patient assessed from head to toe, no acute wound findings on assessment. Preventative skin orders placed and wound care will sign off. Skin care plans:  1-Hydraguard to bilateral sacrum, buttock and heels BID and PRN  2-Elevate heels to offload pressure. 3-Ehob cushion in chair when out of bed. 4-Moisturize skin daily with skin nourishing cream.  5-Turn/reposition q2h or when medically stable for pressure re-distribution on skin.               Shraddha GALVANN, RN, [facility]

## 2023-07-12 NOTE — PROGRESS NOTES
[address] [facility]  Progress Note  Name: King Marlin  MRN: [id number]  Unit/Bed#: [facility] 038-07 I Date of Admission: 7/10/2023   Date of Service: 7/12/2023 I Hospital Day: 1    Assessment/Plan   Fall  Assessment & Plan  - Status post fall with the below noted injuries. - Fall precautions. - Geriatric Medicine consultation for evaluation, medication review and recommendations.  - PT and OT evaluation and treatment as indicated. - Case Management consultation for disposition planning. Closed nondisplaced zone II fracture of sacrum [facility])  Assessment & Plan  - Sacral fracture, present on admission.  - Appreciate Orthopedic surgery evaluation and recommendations.   - Non-operative management  - May remain weightbearing as tolerated on the bilateral lower extremities. - Patient reports continued pain in her left groin that affects her gait related to a recent muscular injury.  - Monitor neurovascular exam.  - Continue multimodal analgesic regimen.  - Continue DVT prophylaxis. - PT and OT evaluation and treatment as indicated. - Outpatient follow up with Orthopedic surgery for re-evaluation. * Generalized weakness  Assessment & Plan  - History of generalized weakness.   - Patient reports some increased weakness in her left lower extremity with ambulation due to left groin pain/injury that occurred approximately 2 weeks ago. - Maintain fall precautions. - Continue PT and OT evaluation and treatment indicated. - Outpatient follow-up with PCP recommended. Acute cystitis without hematuria  Assessment & Plan  - Patient with UTI/acute cystitis without hematuria, present on presentation.   - UA from 7/11/2023 consistent with acute urinary infection; urine culture results pending.   - Additional/contributing data to support UTI includes fever on admission and leukocytosis.   - Continue Rocephin for 3 doses through 7/13/2023.  - Continue to monitor temperature trend and CBC/white blood cell count trend. - Outpatient follow-up with PCP. Sepsis due to gram-negative UTI [facility])  Assessment & Plan  - Patient with sepsis secondary to UTI, present on presentation.   - Sepsis now resolving with resuscitation and initiation of IV antibiotics to treat infectious etiology. - Continue to monitor hemodynamic status and for appropriate response to antibiotic therapy. - Outpatient follow-up with PCP. Hyponatremia  Assessment & Plan  - Patient with acute hyponatremia, present on presentation.   - Hyponatremia worsening as of 7/12/2023 with sodium down to 126 from 129.   - Patient not currently on diuretic therapy, did not receive DDAVP, and appears euvolemic to possibly mildly hypovolemic.  - Urine studies and serum osmol reviewed. - Continue salt tabs 3 times daily, discontinue fluid restriction and resuscitate with NS IV fluids.  - Continue to monitor sodium level with repeat BMP at 2 PM on 7/12/2023. 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   - No evidence of MAXX at this time. - Avoid nephrotoxins and hypotension.  - Outpatient follow-up per routine. Primary hypertension  Assessment & Plan  - Patient with chronic history of hypertension.  - Continue current medication regimen and adjust as indicated. - Outpatient follow-up routine. Bowel Regimen: On MiraLAX and Senokot-S.  VTE Prophylaxis:Sequential compression device (Venodyne)  and Heparin     Disposition: Continue current level of care. Not yet appropriate for discharge. Continue therapy evaluation and treatment as indicated. Case management following for disposition planning. Subjective   Chief Complaint: "I feel pretty good."    Subjective: Patient notes she is doing pretty well this morning. She notes minimal to no pain from her sacral injury.   She does note some pain in her left groin and weakness in her left leg due to a left groin injury she suffered a couple weeks ago. She also notes some discomfort on her buttocks from a wound. She is tolerating her diet without nausea, vomiting or constipation. She offers no other complaints or new complaints today. Objective   Vitals:   Temp:  [98.4 °F (36.9 °C)-100.8 °F (38.2 °C)] 98.5 °F (36.9 °C)  HR:  [] 100  Resp:  [16] 16  BP: ()/(52-91) 118/60    I/O       07/10 0701  07/11 0700 07/11 0701  07/12 0700 07/12 0701  07/13 0700    P. O.  200     I.V. (mL/kg) 500      IV Piggyback 1050      Total Intake(mL/kg) 1550 200 (3.7)     Urine (mL/kg/hr)  700 (0.5)     Stool  0     Total Output  700     Net +1550 -500            Unmeasured Urine Occurrence  3 x     Unmeasured Stool Occurrence  2 x            Physical Exam:   GENERAL APPEARANCE: Patient in no acute distress. HEENT: NCAT; EOMs intact; Mucous membranes moist  NECK / BACK: No sacral tenderness noted. CV: Regular rate and rhythm; no murmur/gallops/rubs appreciated. CHEST / LUNGS: Clear to auscultation; no wheezes/rales/rhonci. No chest wall tenderness. ABD: NABS; soft; non-distended; non-tender. : Spontaneously with pure wick catheter in place. EXT: +2 pulses bilaterally upper & lower extremities; no edema. Patient with mild pain in her left groin with range of motion of the left hip. No sacral tenderness noted. Normal range of motion in all 4 extremities without pain, tenderness or deformity aside from the left groin pain noted. NEURO: GCS 15; no focal neurologic deficits; neurovascularly intact. SKIN: Warm, dry and well perfused; no rash; no jaundice. Small buttocks wound with mild surrounding erythema and tenderness noted; no evidence of infection at this time.     Invasive Devices     Peripheral Intravenous Line  Duration           Peripheral IV 07/11/23 Left Forearm <1 day          Drain  Duration           External Urinary Catheter 1 day                      Lab Results:   Results: I have personally reviewed all pertinent laboratory/tests results, BMP/CMP:   Lab Results   Component Value Date    SODIUM 126 (L) 07/12/2023    K 4.0 07/12/2023     07/12/2023    CO2 20 (L) 07/12/2023    BUN 16 07/12/2023    CREATININE 0.92 07/12/2023    CALCIUM 8.2 (L) 07/12/2023    EGFR 53 07/12/2023    and CBC:   Lab Results   Component Value Date    WBC 18.19 (H) 07/12/2023    HGB 9.4 (L) 07/12/2023    HCT 27.8 (L) 07/12/2023    MCV 87 07/12/2023     07/12/2023    RBC 3.18 (L) 07/12/2023    MCH 29.6 07/12/2023    MCHC 33.8 07/12/2023    RDW 13.4 07/12/2023    MPV 9.4 07/12/2023    NRBC 0 07/12/2023     Imaging: I have personally reviewed pertinent reports.      Other Studies: N/A      Barron Noonan PA-C  7/12/2023  07:02 AM

## 2023-07-12 NOTE — CASE MANAGEMENT
Case Management Discharge Planning Note    Patient name Olivia Gutierrez  Location [address] [place] [address] [address] [id number] MRN [id number]  : 3/12/1928 Date 2023       Current Admission Date: 7/10/2023  Current Admission Diagnosis:Generalized weakness   Patient Active Problem List    Diagnosis Date Noted   • Fall 2023   • Generalized weakness 2023   • Sepsis due to gram-negative UTI ([address]) 2023   • Acute cystitis without hematuria 2023   • Closed nondisplaced zone II fracture of sacrum ([address]) 2023   • Hyponatremia 2023   • Anemia 2023   • H/O left nephrectomy 2023   • Raynaud phenomenon 2023   • Arthritis of carpometacarpal (CMC) joint of right thumb 2022   • Primary osteoarthritis of right wrist 2022   • Closed fracture of right proximal humerus 11/10/2022   • Primary hypertension 2014      LOS (days): 1  Geometric Mean LOS (GMLOS) (days): 2.80  Days to GMLOS:1.9     OBJECTIVE:  Risk of Unplanned Readmission Score: 11.12         Current admission status: Inpatient   Preferred Pharmacy:   [address], [address] [place] [id number]  Phone: [phone] Fax: [phone]    Primary Care Provider: Gregorio Ortiz MD    Primary Insurance: [facility]  Secondary Insurance:     DISCHARGE DETAILS:    Pt accepted to [facility], which is pt's first choice.    CM will submit for insurance approval.

## 2023-07-12 NOTE — PLAN OF CARE
Problem: Potential for Falls  Goal: Patient will remain free of falls  Description: INTERVENTIONS:  - Educate patient/family on patient safety including physical limitations  - Instruct patient to call for assistance with activity   - Consult OT/PT to assist with strengthening/mobility   - Keep Call bell within reach  - Keep bed low and locked with side rails adjusted as appropriate  - Keep care items and personal belongings within reach  - Initiate and maintain comfort rounds  - Make Fall Risk Sign visible to staff  - Offer Toileting every 1 Hours, in advance of need  - Initiate/Maintain alarm  - Obtain necessary fall risk management equipment  - Apply yellow socks and bracelet for high fall risk patients  - Consider moving patient to room near nurses station  Outcome: Progressing     Problem: MOBILITY - ADULT  Goal: Maintain or return to baseline ADL function  Description: INTERVENTIONS:  -  Assess patient's ability to carry out ADLs; assess patient's baseline for ADL function and identify physical deficits which impact ability to perform ADLs (bathing, care of mouth/teeth, toileting, grooming, dressing, etc.)  - Assess/evaluate cause of self-care deficits   - Assess range of motion  - Assess patient's mobility; develop plan if impaired  - Assess patient's need for assistive devices and provide as appropriate  - Encourage maximum independence but intervene and supervise when necessary  - Involve family in performance of ADLs  - Assess for home care needs following discharge   - Consider OT consult to assist with ADL evaluation and planning for discharge  - Provide patient education as appropriate  Outcome: Progressing  Goal: Maintains/Returns to pre admission functional level  Description: INTERVENTIONS:  - Perform BMAT or MOVE assessment daily.   - Set and communicate daily mobility goal to care team and patient/family/caregiver.    - Collaborate with rehabilitation services on mobility goals if consulted  - Perform Range of Motion 3 times a day. - Reposition patient every 2 hours.   - Dangle patient 3 times a day  - Stand patient 3 times a day  - Ambulate patient 3 times a day  - Out of bed to chair 3 times a day   - Out of bed for meals 3 times a day  - Out of bed for toileting  - Record patient progress and toleration of activity level   Outcome: Progressing     Problem: PAIN - ADULT  Goal: Verbalizes/displays adequate comfort level or baseline comfort level  Description: Interventions:  - Encourage patient to monitor pain and request assistance  - Assess pain using appropriate pain scale  - Administer analgesics based on type and severity of pain and evaluate response  - Implement non-pharmacological measures as appropriate and evaluate response  - Consider cultural and social influences on pain and pain management  - Notify physician/advanced practitioner if interventions unsuccessful or patient reports new pain  Outcome: Progressing     Problem: INFECTION - ADULT  Goal: Absence or prevention of progression during hospitalization  Description: INTERVENTIONS:  - Assess and monitor for signs and symptoms of infection  - Monitor lab/diagnostic results  - Monitor all insertion sites, i.e. indwelling lines, tubes, and drains  - Monitor endotracheal if appropriate and nasal secretions for changes in amount and color  - [place] appropriate cooling/warming therapies per order  - Administer medications as ordered  - Instruct and encourage patient and family to use good hand hygiene technique  - Identify and instruct in appropriate isolation precautions for identified infection/condition  Outcome: Progressing  Goal: Absence of fever/infection during neutropenic period  Description: INTERVENTIONS:  - Monitor WBC    Outcome: Progressing     Problem: DISCHARGE PLANNING  Goal: Discharge to home or other facility with appropriate resources  Description: INTERVENTIONS:  - Identify barriers to discharge w/patient and caregiver  - Arrange for needed discharge resources and transportation as appropriate  - Identify discharge learning needs (meds, wound care, etc.)  - Arrange for interpretive services to assist at discharge as needed  - Refer to Case Management Department for coordinating discharge planning if the patient needs post-hospital services based on physician/advanced practitioner order or complex needs related to functional status, cognitive ability, or social support system  Outcome: Progressing     Problem: Knowledge Deficit  Goal: Patient/family/caregiver demonstrates understanding of disease process, treatment plan, medications, and discharge instructions  Description: Complete learning assessment and assess knowledge base. Interventions:  - Provide teaching at level of understanding  - Provide teaching via preferred learning methods  Outcome: Progressing     Problem: Prexisting or High Potential for Compromised Skin Integrity  Goal: Skin integrity is maintained or improved  Description: INTERVENTIONS:  - Identify patients at risk for skin breakdown  - Assess and monitor skin integrity  - Assess and monitor nutrition and hydration status  - Monitor labs   - Assess for incontinence   - Turn and reposition patient  - Assist with mobility/ambulation  - Relieve pressure over bony prominences  - Avoid friction and shearing  - Provide appropriate hygiene as needed including keeping skin clean and dry  - Evaluate need for skin moisturizer/barrier cream  - Collaborate with interdisciplinary team   - Patient/family teaching  - Consider wound care consult   Outcome: Progressing     Problem: Nutrition/Hydration-ADULT  Goal: Nutrient/Hydration intake appropriate for improving, restoring or maintaining nutritional needs  Description: Monitor and assess patient's nutrition/hydration status for malnutrition. Collaborate with interdisciplinary team and initiate plan and interventions as ordered.   Monitor patient's weight and dietary intake as ordered or per policy. Utilize nutrition screening tool and intervene as necessary. Determine patient's food preferences and provide high-protein, high-caloric foods as appropriate.      INTERVENTIONS:  - Monitor oral intake, urinary output, labs, and treatment plans  - Assess nutrition and hydration status and recommend course of action  - Evaluate amount of meals eaten  - Assist patient with eating if necessary   - Allow adequate time for meals  - Recommend/ encourage appropriate diets, oral nutritional supplements, and vitamin/mineral supplements  - Order, calculate, and assess calorie counts as needed  - Recommend, monitor, and adjust tube feedings and TPN/PPN based on assessed needs  - Assess need for intravenous fluids  - Provide specific nutrition/hydration education as appropriate  - Include patient/family/caregiver in decisions related to nutrition  Outcome: Progressing

## 2023-07-12 NOTE — PHYSICAL THERAPY NOTE
PHYSICAL THERAPY TREATMENT          Patient Name: Kedar Pitt  [id number] Date: 7/12/2023 07/12/23 1055   Note Type   Note Type Treatment   Pain Assessment   Pain Assessment Tool 0-10   Pain Score No Pain   Restrictions/Precautions   Weight Bearing Precautions Per Order Yes   RLE Weight Bearing Per Order WBAT   LLE Weight Bearing Per Order WBAT   Other Precautions Fall Risk   General   Chart Reviewed Yes   Response to Previous Treatment Patient with no complaints from previous session. Family/Caregiver Present No   Cognition   Overall Cognitive Status WFL   Arousal/Participation Cooperative   Attention Within functional limits   Orientation Level Oriented X4   Memory Within functional limits   Following Commands Follows multistep commands with increased time or repetition   Comments Pt pleasant and cooperative. Pt with poor safety awareness and insight to current condition. Bed Mobility   Additional Comments Pt OOB in chair at time of PT treatment. Transfers   Sit to Stand 4  Minimal assistance   Additional items Assist x 1; Increased time required;Verbal cues   Stand to Sit 4  Minimal assistance   Additional items Assist x 1; Increased time required;Verbal cues   Toilet transfer 4  Minimal assistance   Additional items Assist x 1; Increased time required;Verbal cues   Additional Comments STS completed 5x t/o session   Ambulation/Elevation   Gait pattern Decreased foot clearance; Step to;Excessively slow;Decreased hip extension;Decreased heel strike;Decreased toe off   Gait Assistance 4  Minimal assist   Additional items Assist x 1;Verbal cues   Assistive Device Rolling walker   Distance 10 ft in room   Ambulation/Elevation Additional Comments RW used for transfers and ambulation.    Balance   Static Sitting Fair +   Dynamic Sitting Fair   Static Standing Fair -   Dynamic Standing Poor +   Ambulatory Poor   Activity Tolerance Activity Tolerance Patient tolerated treatment well   Medical Staff Made [address] Pina Borges, PT; Trina, OT; Adrien Omalley. Seen with OT due to pt's medical status. Nurse Made Aware Okay per RN   Exercises   Quad Sets Sitting;10 reps;Bilateral  (2 sets)   Heelslides Sitting;10 reps;Bilateral   Glute Sets Sitting;20 reps   Knee AROM Long Arc Quad Sitting;10 reps;Bilateral  (2 sets)   Ankle Pumps Sitting;20 reps;Bilateral   Marching Sitting;10 reps;Bilateral  (2 sets)   Assessment   Prognosis Good   Problem List Decreased strength;Decreased range of motion;Decreased endurance; Impaired balance;Decreased mobility   Assessment Pt OOB in chair at time of PT treatment. Pt A&O x4 and denies c/o pain. Pt is Emily x1 for transfers and ambulation with RW. Pt cooperative and motivated to increase mobility. Pt is progressing as indicated by ability to ambulate 10' today compared to 3' x2 at IE. Pt tolerated all therapeutic exercises without c/o increased pain or fatigue. Rest breaks were provided in between sets while pt performed UE ADLs with OT. Pt demonstrates ability to perform seated exercises safely and independently. Pt educated on the importance of continuing to perform exercises during hospital stay. Pt verbalized understanding. Following PT eval, pt was left OOB in chair with phone, call bell, and personal belongings in reach. Pt educated on the importance of calling for assistance when wanting to get out of the chair d/t increased fall risk. Pt verbalized understanding. Pt's current functional limitations include decreased LE strength and ROM, decreased endurance, impaired balance, fall risk, decreased IND. PT continues to recommend D/C to rehab. Will continue to follow as able.    Barriers to Discharge Decreased caregiver support  (Pt home alione)   Goals   Patient Goals To increase independence   STG Expiration Date 07/21/23   PT Treatment Day 1   Plan   Treatment/Interventions ADL retraining;Functional transfer training;Elevations;LE strengthening/ROM; Endurance training; Therapeutic exercise;Patient/family training;Equipment eval/education; Bed mobility;Gait training; Compensatory technique education   Progress Progressing toward goals   PT Frequency 3-5x/wk   Recommendation   PT Discharge Recommendation Post acute rehabilitation services   Additional Comments Pt has a walker   AM-PAC Basic Mobility Inpatient   Turning in Flat Bed Without Bedrails 3   Lying on Back to Sitting on Edge of Flat Bed Without Bedrails 3   Moving Bed to Chair 3   Standing Up From Chair Using Arms 3   Walk in Room 3   Climb 3-5 Stairs With Railing 1   Basic Mobility Inpatient Raw Score 16   Basic Mobility Standardized Score 38.32   Highest Level Of Mobility   JH-HLM Goal 5: Stand one or more mins   JH-HLM Achieved 6: Walk 10 steps or more   Education   Education Provided Mobility training;Home exercise program   Patient Demonstrates acceptance/verbal understanding   End of Consult   Patient Position at End of Consult Bed/Chair alarm activated; All needs within reach; Bedside chair   Maryann Mills, SPT

## 2023-07-13 PROBLEM — S32.591S: Status: ACTIVE | Noted: 2023-01-01

## 2023-07-13 PROBLEM — S32.592S: Status: ACTIVE | Noted: 2023-01-01

## 2023-07-13 NOTE — CONSULTS
Nephrology Consultation       Patient: Julius Bruce               Sex: female          DOA: 7/10/2023  9:48 PM   Date of Birth: @        Age: @        LOS:  LOS: 2 days     REFERRING PHYSICIAN:     REASON FOR THE REFERRAL / CONSULTATION:  Hyponatremia    DATE OF CONSULTATION / SERVICE: 7/13/2023    ADMISSION DIAGNOSIS: Generalized weakness     CHIEF COMPLAINT     Fall    HPI     Patient is a 79 yo female w/ PMH of HTN, GERD, left nephrectomy from skin cancer, multiple bone fractures in the past who comes in after falling after her legs gave out w/o head strike and LOS. Patient also reports falling two weeks ago. Patient lives by herself. In the hospital, hip XR showed some acute pubic rami fracture more on the left than the right. CT A/P showed possibly acute S2 fracture aloing with chronic T12/L2/L4 fractures, no subacute pelvic trauma. Othro recommended non-operative management. Patient was also found with a UTI on admission so was treated with Rocephin. Patient was consulted to nephrology for hyponatremia, was unsuccessfully being treated with 1g salt tablets 3x daily. Today- patient reports generalized weakness and sob w/ wheezing along with feeling thirsty and dark urine. Reports also feeling chilly since admission. Reports constipation before admission. Denies fevers, headaches/lightheadedness, chest pain, abdominal pain, nausea/vomiting, diarrhea, dysuria.      PAST MEDICAL HISTORY     Past Medical History:   Diagnosis Date   • Cancer [facility])     Cervical, Kidney, melanoma Per MRI Screening form 2/5/19       PAST SURGICAL HISTORY     Past Surgical History:   Procedure Laterality Date   • NEPHRECTOMY Left        ALLERGIES     No Known Allergies    SOCIAL HISTORY     Social History     Substance and Sexual Activity   Alcohol Use Not Currently     Social History     Substance and Sexual Activity   Drug Use Not Currently     Social History     Tobacco Use   Smoking Status Never   Smokeless Tobacco Never FAMILY HISTORY     Family History   Problem Relation Age of Onset   • Brain cancer Other        CURRENT MEDICATIONS       Current Facility-Administered Medications:   •  acetaminophen (TYLENOL) tablet 650 mg, 650 mg, Oral, Q6H [address], Claudene Bumpers, MD, 650 mg at 07/13/23 0620  •  heparin (porcine) subcutaneous injection 5,000 Units, 5,000 Units, Subcutaneous, Q8H [address], 5,000 Units at 07/13/23 0620 **AND** [CANCELED] Platelet count, , , Once, Kamille, DO  •  HYDROmorphone HCl (DILAUDID) injection 0.2 mg, 0.2 mg, Intravenous, Q4H PRN, Claudene Bumpers, MD  •  losartan (COZAAR) tablet 100 mg, 100 mg, Oral, Daily, Claudene Bumpers, MD, 100 mg at 07/12/23 2882  •  oxyCODONE (ROXICODONE) IR tablet 5 mg, 5 mg, Oral, Q6H PRN, Claudene Bumpers, MD, 5 mg at 07/11/23 2000  •  oxyCODONE (ROXICODONE) split tablet 2.5 mg, 2.5 mg, Oral, Q6H PRN, Claudene Bumpers, MD, 2.5 mg at 07/12/23 2043  •  pantoprazole (PROTONIX) EC tablet 40 mg, 40 mg, Oral, Early Morning, Claudene Bumpers, MD, 40 mg at 07/13/23 0620  •  polyethylene glycol (MIRALAX) packet 17 g, 17 g, Oral, Daily, Tyler Rogers PA-C, 17 g at 07/13/23 0920  •  senna-docusate sodium (SENOKOT S) 8.6-50 mg per tablet 1 tablet, 1 tablet, Oral, HS, Tyler Rogers PA-C, 1 tablet at 07/12/23 2239  •  sodium chloride 0.9 % bolus 500 mL, 500 mL, Intravenous, Once, Kati Rogers PA-C  •  sodium chloride tablet 1 g, 1 g, Oral, TID With Meals, Meena Rogers PA-C, 1 g at 07/13/23 0919    REVIEW OF SYSTEMS     Complete 10 points of review of systems were obtained and discussed in length with patient today. Complete 10 points of review of systems were negative/unremarkable except mentioned in the HPI section.       OBJECTIVE     Current Weight: Weight - Scale: 54.4 kg (120 lb)  /55   Pulse 87   Temp 98.2 °F (36.8 °C)   Resp 16   Ht 5' (1.524 m)   Wt 54.4 kg (120 lb)   SpO2 97%   BMI 23.44 kg/m²   Vitals:    07/13/23 0721   BP: 108/55   Pulse: 87   Resp: 16   Temp: 98.2 °F (36.8 °C)   SpO2: 97%     Body mass index is 23.44 kg/m². Intake/Output Summary (Last 24 hours) at 7/13/2023 1004  Last data filed at 7/13/2023 0900  Gross per 24 hour   Intake 1530 ml   Output 250 ml   Net 1280 ml       PHYSICAL EXAMINATION     Physical Exam  Constitutional:       General: She is not in acute distress. HENT:      Head: Normocephalic and atraumatic. Mouth/Throat:      Mouth: Mucous membranes are dry. Pharynx: Oropharynx is clear. Eyes:      General:         Right eye: No discharge. Left eye: No discharge. Conjunctiva/sclera: Conjunctivae normal.   Cardiovascular:      Rate and Rhythm: Normal rate and regular rhythm. Heart sounds: Murmur heard. Pulmonary:      Breath sounds: Wheezing present. Comments: Diminished breathing sounds bilaterally  Abdominal:      General: Abdomen is flat. Bowel sounds are normal. There is distension. Palpations: Abdomen is soft. Tenderness: There is no abdominal tenderness. Musculoskeletal:      Cervical back: Neck supple. No rigidity. Right lower leg: Edema present. Left lower leg: Edema present. Comments: Trace - 1+ pitting edema   Skin:     General: Skin is warm and dry. Capillary Refill: Capillary refill takes less than 2 seconds. Neurological:      General: No focal deficit present. Mental Status: She is alert.    Psychiatric:         Mood and Affect: Mood normal.         Behavior: Behavior normal.           LAB RESULTS        Results from last 7 days   Lab Units 07/13/23  0441 07/12/23  1457 07/12/23  0432 07/11/23  0833 07/11/23  0832 07/10/23  2239   WBC Thousand/uL 20.33*  --  18.19*  --  18.79* 21.58*   HEMOGLOBIN g/dL 8.7*  --  9.4*  --  9.7* 9.8*   HEMATOCRIT % 26.6*  --  27.8*  --  29.4* 29.6*   PLATELETS Thousands/uL 190  --  169  --  148* 150   SODIUM mmol/L 124* 126* 126* 129*  --  129*   POTASSIUM mmol/L 4.0 4.0 4.0 3.8  --  4.4   CHLORIDE mmol/L 97 100 101 98  --  99   CO2 mmol/L 18* 19* 20* 22  --  22   BUN mg/dL 19 16 16 20  --  21   CREATININE mg/dL 0.93 1.00 0.92 1.05  --  1.12   EGFR ml/min/1.73sq m 52 48 53 45  --  41   CALCIUM mg/dL 8.0* 8.6 8.2* 8.3  --  8.7       I have personally reviewed the old medical records and patient's previously known baseline creatinine level is ~ 0.9-1.1    RADIOLOGY RESULTS     CT abdomen pelvis wo contrast   Final Result by Jaylon Tracey MD (07/11 9743)         1. S2 fracture, possibly acute. 2. Chronic T12, L2 and L4 fractures. 3. No subacute intra-abdominal or pelvic trauma. 4. Constipation. 5. Stone filled gallbladder. Mild wall thickening in the fundus. If there is concern for acute cholecystitis, recommend right upper quadrant ultrasound. Workstation performed: [id number]         XR hips bilateral 2 vw w pelvis if performed   Final Result by Rudy Frazier MD (07/11 9888)      Findings are suspicious for pubic rami fractures more likely acute on the left than the right. CT suggested inferior fracture may be chronic; however there appears to be some discontinuity on the frontal pelvis projection. It is possible these were    sustained 2 weeks ago other although difficult to appreciate at that time. Correlation with any point tenderness is advised. Degenerative changes      Findings were discussed with Marcos Guadalupe of trauma surgery on 7/11/2023 at 4:20 p.m. Workstation performed: [id number]             PLAN / RECOMMENDATIONS      Hypotonic hypovolemic hyponatremia   Serum Osmo: 265 (low)  Urine osmo: 258 (high)  Urine Na: 16 (low)  Currently on 1g salt tablets 3x daily. Was also given NS bolus on 7/11 and morning of 7/13    Plan  -Normal saline  -Monitor Na trend    Dayan Coley, MS4  Nephrology  7/13/2023      Thank you for allowing me to participate in the care of Zahida Styles. Please don't hesitate to call, text, email, or TigerText with any questions.      This text is generated with voice recognition software. There may be translation, syntax,  or grammatical errors. If you have any questions, please contact the dictating provider.

## 2023-07-13 NOTE — PROGRESS NOTES
[address] [facility]  Progress Note  Name: Marlin Rowe  MRN: [id number]  Unit/Bed#: PPHP 133-47 I Date of Admission: 7/10/2023   Date of Service: 7/13/2023 I Hospital Day: 2    Assessment/Plan   Bilateral fracture of pubic rami, sequela  Assessment & Plan  - XR results indicate possible acute left pubic rami fracture in addition to a right sided pubic rami fracture  - appreciate orthopedics consult and recommendations  - continue WBAT  - PT and OT    Fall  Assessment & Plan  - Status post fall with the below noted injuries. - Fall precautions. - Geriatric Medicine consultation for evaluation, medication review and recommendations.  - PT and OT evaluation and treatment as indicated. - Case Management consultation for disposition planning. 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   - No evidence of MAXX at this time. - Avoid nephrotoxins and hypotension.  - Outpatient follow-up per routine. Primary hypertension  Assessment & Plan  - Patient with chronic history of hypertension.  - Continue current medication regimen and adjust as indicated. - Outpatient follow-up routine. Hyponatremia  Assessment & Plan  - Patient with acute hyponatremia, present on presentation.   - Hyponatremia worsening as of 7/12/2023 with sodium down to 126 from 129.   - Patient not currently on diuretic therapy, did not receive DDAVP, and appears euvolemic to possibly mildly hypovolemic.  - Urine studies and serum osmol reviewed.   - Continue salt tabs 3 times daily, discontinue fluid restriction and resuscitate with NS IV fluids.  - Continue to monitor sodium level with repeat BMP Q6hrs    Closed nondisplaced zone II fracture of sacrum [facility])  Assessment & Plan  - Sacral fracture, present on admission.  - Appreciate Orthopedic surgery evaluation and recommendations.   - Non-operative management  - May remain weightbearing as tolerated on the bilateral lower extremities. - Patient reports continued pain in her left groin that affects her gait related to a recent muscular injury.  - Monitor neurovascular exam.  - Continue multimodal analgesic regimen.  - Continue DVT prophylaxis. - PT and OT evaluation and treatment as indicated. - Outpatient follow up with Orthopedic surgery for re-evaluation. Acute cystitis without hematuria  Assessment & Plan  - Patient with UTI/acute cystitis without hematuria, present on presentation.   - UA from 7/11/2023 consistent with acute urinary infection; urine culture results pending.   - Additional/contributing data to support UTI includes fever on admission and leukocytosis. - Completed Rocephin for 3 doses through 7/13/2023.  - Continue to monitor temperature trend and CBC/white blood cell count trend. - Outpatient follow-up with PCP. Sepsis due to gram-negative UTI [facility])  Assessment & Plan  - Patient with sepsis secondary to UTI, present on presentation.   - Sepsis now resolving with resuscitation and initiation of IV antibiotics to treat infectious etiology. - Continue to monitor hemodynamic status and for appropriate response to antibiotic therapy. - Outpatient follow-up with PCP. * Generalized weakness  Assessment & Plan  - History of generalized weakness.   - Patient reports some increased weakness in her left lower extremity with ambulation due to left groin pain/injury that occurred approximately 2 weeks ago. - Maintain fall precautions. - Continue PT and OT evaluation and treatment indicated. - Outpatient follow-up with PCP recommended. Bowel Regimen: senokot s  VTE Prophylaxis:Heparin     Disposition: Injuries with routine healing, recommended rehab. Discharge to rehab when sodium improved    Subjective   Chief Complaint: "I'm just uncomfortable"    Subjective: Pt reports she is uncomfortable in the reclining chair and needs help sitting up.  She reports that her pain is controlled, she is breathing well and tolerating a diet. She reports she is thirsty. Objective   Vitals:   Temp:  [98.1 °F (36.7 °C)-100.7 °F (38.2 °C)] 98.1 °F (36.7 °C)  HR:  [] 102  Resp:  [16-18] 16  BP: (106-115)/(54-60) 115/60    I/O       07/11 0701 07/12 0700 07/12 0701 07/13 0700 07/13 0701  07/14 0700    P. O. 200 1050 480    I.V. (mL/kg)       IV Piggyback       Total Intake(mL/kg) 200 (3.7) 1050 (19.3) 480 (8.8)    Urine (mL/kg/hr) 700 (0.5) 250 (0.2)     Stool 0      Total Output 700 250     Net -500 +800 +480           Unmeasured Urine Occurrence 3 x 1 x     Unmeasured Stool Occurrence 2 x 0 x            Physical Exam:   GENERAL APPEARANCE: Patient in no acute distress. HEENT: NCAT; PERRL, EOMs intact; Mucous membranes moist  NECK / BACK: Nontender  CV: Regular rate and rhythm; murmur appreciated. CHEST / LUNGS: Clear to auscultation; no wheezes/rales/rhonci. ABD: NABS; soft; non-distended; non-tender. : Voiding dark urine  EXT: +2 pulses bilaterally upper & lower extremities; no edema. NEURO: GCS 15; no focal neurologic deficits; neurovascularly intact. SKIN: Warm, dry and well perfused; no rash; no jaundice. Invasive Devices     Peripheral Intravenous Line  Duration           Peripheral IV 07/11/23 Left Forearm 1 day    Peripheral IV 07/13/23 Dorsal (posterior); Right Forearm <1 day          Drain  Duration           External Urinary Catheter 2 days                      Lab Results:   Results: I have personally reviewed all pertinent laboratory/tests results, BMP/CMP:   Lab Results   Component Value Date    SODIUM 124 (L) 07/13/2023    K 4.0 07/13/2023    CL 97 07/13/2023    CO2 18 (L) 07/13/2023    BUN 19 07/13/2023    CREATININE 0.93 07/13/2023    CALCIUM 8.0 (L) 07/13/2023    EGFR 52 07/13/2023    and CBC:   Lab Results   Component Value Date    WBC 20.33 (H) 07/13/2023    HGB 8.7 (L) 07/13/2023    HCT 26.6 (L) 07/13/2023    MCV 89 07/13/2023     07/13/2023    RBC 3.00 (L) 07/13/2023 MCH 29.0 07/13/2023    MCHC 32.7 07/13/2023    RDW 13.7 07/13/2023    MPV 9.6 07/13/2023     Imaging: I have personally reviewed pertinent reports.      Other Studies: none

## 2023-07-13 NOTE — QUICK NOTE
Called patient's daughter, Estrella Bean, and we discussed the patient's treatment plan in regards to her hyponatremia. Their questions and concerns were answered to their satisfaction and they were appreciative of the phone call.

## 2023-07-13 NOTE — ASSESSMENT & PLAN NOTE
- XR results indicate possible acute left pubic rami fracture in addition to a right sided pubic rami fracture  - appreciate orthopedics consult and recommendations  - continue WBAT  - PT and OT

## 2023-07-13 NOTE — PROGRESS NOTES
Progress Note - Geriatric Medicine   Elmer Celeste 80 y.o. female MRN: [id number]  [facility] Encounter: [id number]      Assessment/Plan:    Ambulatory dysfunction with fall  -reportedly mechanical fall about two weeks prior to admit and on 7/10  -injuries as outlined below  -denies use of assist device for ambulation at baseline but has been using walker since initial fall two weeks ago  -remote hx of mechanical fall no additional in past six months aside from two leading to admit  -remains high risk future falls due to age, hx fall, impaired vision, deconditioning/debility and unfamiliar environment   -encourage good body mechanics and assist with all transfers  -keep personal items and call bell close to prevent reaching  -maintain environment free of fall hazards  -recommend home fall risk assessment and personal fall alert system on returning home following rehab   -PT and OT following      Closed fracture of sacrum  -s/p fall as outlined above  -CT abdomen pelvis without contrast 7/11 report reviewed - reports S2 fracture possibly   -Conservative management recommended by Orthopedics - WBAT with ASA for DVT PPx  -Continue multimodal pain control   -recommend checking vitamin D to ensure adequate stores and supplement for any needs unable to be met by diet alone   -PT/OT following      Acute pain due to trauma  -continue acute multimodal pain control   -consider adjuncts such as lidocaine patch topically  -encourage addition of non-pharmacologic pain treatment including ice and frequent repositioning  -recommend bowel regimen to prevent and treat constipation due to increased risk with acute pain and opiate pain medications     UTI  -UA on admit positive for bacteria, leukocytes and nitrites, pt with leukocytosis  -currently on Rocephin, monitor temp and wbc curves      Hyponatremia   -[Na] low at 129 on presentation worsened today with Na 124 despite initiation sodium tabs   -monitor levels closely, avoid rapid and drastic fluctuations to avoid overcorrection, starting to have mild degree of confusion concern may be due to hyponatremia, consider recheck labs to ensure stability   -consider stricter fluid restriction and Nephrology consult     Aortic stenosis  -asymptomatic, monitored with annual Echo which is currently due as o/p per patient   -continue close o/p f/u with PCP and Cardiology      Hx left nephrectomy  -s/p remote hx nephrectomy for malignancy  -monitor renal function closely, avoid nephrotoxins and continue to optimize hemodynamics     Impaired Vision  -recommend use of corrective lenses at all appropriate times  -Consider large font for printed material provided to patient     Impaired mastication  -Requires use of dentures -encourage use at all appropriate times  -ensure meal consistency appropriate for abilities  -continue aspiration precautions     Impaired Hearing  -appears to be mildly impaired of hearing, denies use of hearing aid or assist device at baseline   -encourage providers and caregivers to speak slowly and clearly directly to patient  -minimize background noise to encourage patient engagement  -consider use of hearing amplifier to reduce risk of straining to hear   -consider o/p audiology referral if pt amenable      Cognitive screening  -Alert and fully oriented on admit, denies memory or cognitive concerns now developing some confusion, monitor closely and correct metabolic derangements, reorient frequently due to concern for possible beginning of delirium symptoms   -Reportedly full independent with ADLs and IADLs at baseline  -no prior cognitive testing on record for review  -[facility] 11/7/22 images personally viewed, reveals at least moderate chronic microangiopathic changes most notable bilateral temporal areas  -no recent TSH or B12, consider checking with routine labs  -encourage use of sensory assist devices such as glasses at all appropriate times to reduce risk sensory impairment contributing to isolation confusion and more precipitous cognitive decline   -encourage pt remain physically, socially and cognitively active and engaged to maintain cognitive acuity      Frailty syndrome in geriatric patient   -clinical frailty scale stage IV, vulnerable   -Multifactorial including age, ambulatory dysfunction, aortic stenosis and multiple additional chronic medical co-morbidities in elderly individual with limited physiologic and metabolic reserves  -Encourage well-balanced nutrition  -continue optimization of chronic conditions and address acute metabolic derangements as arise  -continue psychosocial supports of pt and family      High risk developing delirium  -Patient is high risk of delirium due to age, fall, traumatic injuries, acute pain, hospitalization, hospital env   -concern starting to have early symptoms of delirium due to metabolic derangements, recs as above  -maintain normal sleep/wake cycle  -ensure that pain is well controlled  -monitor for fecal and urinary retention   -provide frequent reorientation and redirection as indicated and appropriate    -redirect unwanted behaviors as first line  -avoid sedating medications as poss as may contribute to confusion    Care coordination: rounded with Maria Antonia Spence (RN) and Mandie Garcia (Trauma AP)     Subjective:     Deborah Carroll is seen and examined, she is restless with mild confusion answering some questions inappropriately and inconsistently which is new today. She does consistently report feeling cold/chills despite being diaphoretic and the room being very warm. Review of Systems   Constitutional: Positive for appetite change and chills. Negative for fever. HENT: Negative. Eyes: Negative. Respiratory: Positive for shortness of breath. Cardiovascular: Negative. Gastrointestinal: Negative. Endocrine: Positive for cold intolerance. Genitourinary: Positive for difficulty urinating.    Musculoskeletal: Positive for back pain and gait problem. Right hip pain    Skin: Negative. Neurological: Positive for weakness. Negative for dizziness, light-headedness and headaches. Hematological: Negative. Psychiatric/Behavioral: Negative. Negative for sleep disturbance. All other systems reviewed and are negative. Objective:     Vitals: Blood pressure 115/60, pulse 102, temperature 98.1 °F (36.7 °C), resp. rate 16, height 5' (1.524 m), weight 54.4 kg (120 lb), SpO2 97 %. ,Body mass index is 23.44 kg/m². Intake/Output Summary (Last 24 hours) at 7/13/2023 1055  Last data filed at 7/13/2023 1005  Gross per 24 hour   Intake 2030 ml   Output 250 ml   Net 1780 ml     Current Medications: Reviewed    Physical Exam:   Physical Exam  Vitals and nursing note reviewed. Constitutional:       Appearance: She is diaphoretic. HENT:      Head: Normocephalic. Nose: Nose normal.      Mouth/Throat:      Mouth: Mucous membranes are dry. Eyes:      General: No scleral icterus. Right eye: No discharge. Left eye: No discharge. Conjunctiva/sclera: Conjunctivae normal.   Neck:      Comments: Trachea midline, phonation normal  Cardiovascular:      Rate and Rhythm: Tachycardia present. Pulses: Normal pulses. Pulmonary:      Comments: Coarse breath sounds, no rales or rhonci   Abdominal:      Palpations: Abdomen is soft. Tenderness: There is no abdominal tenderness. Musculoskeletal:      Cervical back: Neck supple. Comments: Reduced overall muscle mass    Skin:     Coloration: Skin is pale. Comments: Diaphoretic    Neurological:      Mental Status: She is alert. Comments: Awake and alert, very forgetful and not answering ques appropriately    Psychiatric:      Comments: Impulsive and inattentive         Invasive Devices     Peripheral Intravenous Line  Duration           Peripheral IV 07/11/23 Left Forearm 1 day    Peripheral IV 07/13/23 Dorsal (posterior); Right Forearm <1 day Drain  Duration           External Urinary Catheter 2 days              Lab Results:     I have personally reviewed pertinent lab results including the followin/13- CBC and BMP

## 2023-07-14 PROBLEM — J90 BILATERAL PLEURAL EFFUSION: Status: ACTIVE | Noted: 2023-01-01

## 2023-07-14 PROBLEM — Z87.81 HISTORY OF RIB FRACTURE: Chronic | Status: ACTIVE | Noted: 2023-01-01

## 2023-07-14 PROBLEM — R65.10 SIRS (SYSTEMIC INFLAMMATORY RESPONSE SYNDROME) (HCC): Status: ACTIVE | Noted: 2023-07-14

## 2023-07-14 PROBLEM — K80.20 CHOLELITHIASIS: Status: ACTIVE | Noted: 2023-01-01

## 2023-07-14 NOTE — ASSESSMENT & PLAN NOTE
- Patient with sepsis secondary to UTI, present on presentation.   - Sepsis now resolving with resuscitation and initiation of IV antibiotics to treat infectious etiology. - Completed Rocephin IV x 3 days  - Continue to monitor hemodynamic status   - Urine culture positive for E Coli  - Outpatient follow-up with PCP.

## 2023-07-14 NOTE — PLAN OF CARE
Problem: Potential for Falls  Goal: Patient will remain free of falls  Description: INTERVENTIONS:  - Educate patient/family on patient safety including physical limitations  - Instruct patient to call for assistance with activity   - Consult OT/PT to assist with strengthening/mobility   - Keep Call bell within reach  - Keep bed low and locked with side rails adjusted as appropriate  - Keep care items and personal belongings within reach  - Initiate and maintain comfort rounds  - Make Fall Risk Sign visible to staff  - Offer Toileting every 1 Hours, in advance of need  - Initiate/Maintain alarm  - Obtain necessary fall risk management equipment  - Apply yellow socks and bracelet for high fall risk patients  - Consider moving patient to room near nurses station  Outcome: Progressing     Problem: MOBILITY - ADULT  Goal: Maintain or return to baseline ADL function  Description: INTERVENTIONS:  -  Assess patient's ability to carry out ADLs; assess patient's baseline for ADL function and identify physical deficits which impact ability to perform ADLs (bathing, care of mouth/teeth, toileting, grooming, dressing, etc.)  - Assess/evaluate cause of self-care deficits   - Assess range of motion  - Assess patient's mobility; develop plan if impaired  - Assess patient's need for assistive devices and provide as appropriate  - Encourage maximum independence but intervene and supervise when necessary  - Involve family in performance of ADLs  - Assess for home care needs following discharge   - Consider OT consult to assist with ADL evaluation and planning for discharge  - Provide patient education as appropriate  Outcome: Progressing     Problem: PAIN - ADULT  Goal: Verbalizes/displays adequate comfort level or baseline comfort level  Description: Interventions:  - Encourage patient to monitor pain and request assistance  - Assess pain using appropriate pain scale  - Administer analgesics based on type and severity of pain and evaluate response  - Implement non-pharmacological measures as appropriate and evaluate response  - Consider cultural and social influences on pain and pain management  - Notify physician/advanced practitioner if interventions unsuccessful or patient reports new pain  Outcome: Progressing     Problem: INFECTION - ADULT  Goal: Absence or prevention of progression during hospitalization  Description: INTERVENTIONS:  - Assess and monitor for signs and symptoms of infection  - Monitor lab/diagnostic results  - Monitor all insertion sites, i.e. indwelling lines, tubes, and drains  - Monitor endotracheal if appropriate and nasal secretions for changes in amount and color  - [place] appropriate cooling/warming therapies per order  - Administer medications as ordered  - Instruct and encourage patient and family to use good hand hygiene technique  - Identify and instruct in appropriate isolation precautions for identified infection/condition  Outcome: Progressing  Goal: Absence of fever/infection during neutropenic period  Description: INTERVENTIONS:  - Monitor WBC    Outcome: Progressing     Problem: DISCHARGE PLANNING  Goal: Discharge to home or other facility with appropriate resources  Description: INTERVENTIONS:  - Identify barriers to discharge w/patient and caregiver  - Arrange for needed discharge resources and transportation as appropriate  - Identify discharge learning needs (meds, wound care, etc.)  - Arrange for interpretive services to assist at discharge as needed  - Refer to Case Management Department for coordinating discharge planning if the patient needs post-hospital services based on physician/advanced practitioner order or complex needs related to functional status, cognitive ability, or social support system  Outcome: Progressing     Problem: Knowledge Deficit  Goal: Patient/family/caregiver demonstrates understanding of disease process, treatment plan, medications, and discharge instructions  Description: Complete learning assessment and assess knowledge base. Interventions:  - Provide teaching at level of understanding  - Provide teaching via preferred learning methods  Outcome: Progressing     Problem: Prexisting or High Potential for Compromised Skin Integrity  Goal: Skin integrity is maintained or improved  Description: INTERVENTIONS:  - Identify patients at risk for skin breakdown  - Assess and monitor skin integrity  - Assess and monitor nutrition and hydration status  - Monitor labs   - Assess for incontinence   - Turn and reposition patient  - Assist with mobility/ambulation  - Relieve pressure over bony prominences  - Avoid friction and shearing  - Provide appropriate hygiene as needed including keeping skin clean and dry  - Evaluate need for skin moisturizer/barrier cream  - Collaborate with interdisciplinary team   - Patient/family teaching  - Consider wound care consult   Outcome: Progressing     Problem: Nutrition/Hydration-ADULT  Goal: Nutrient/Hydration intake appropriate for improving, restoring or maintaining nutritional needs  Description: Monitor and assess patient's nutrition/hydration status for malnutrition. Collaborate with interdisciplinary team and initiate plan and interventions as ordered. Monitor patient's weight and dietary intake as ordered or per policy. Utilize nutrition screening tool and intervene as necessary. Determine patient's food preferences and provide high-protein, high-caloric foods as appropriate.      INTERVENTIONS:  - Monitor oral intake, urinary output, labs, and treatment plans  - Assess nutrition and hydration status and recommend course of action  - Evaluate amount of meals eaten  - Assist patient with eating if necessary   - Allow adequate time for meals  - Recommend/ encourage appropriate diets, oral nutritional supplements, and vitamin/mineral supplements  - Order, calculate, and assess calorie counts as needed  - Recommend, monitor, and adjust tube feedings and TPN/PPN based on assessed needs  - Assess need for intravenous fluids  - Provide specific nutrition/hydration education as appropriate  - Include patient/family/caregiver in decisions related to nutrition  Outcome: Progressing

## 2023-07-14 NOTE — PLAN OF CARE
Problem: PHYSICAL THERAPY ADULT  Goal: Performs mobility at highest level of function for planned discharge setting. See evaluation for individualized goals. Description: Treatment/Interventions: ADL retraining, Elevations, Functional transfer training, LE strengthening/ROM, Endurance training, Therapeutic exercise, Equipment eval/education, Patient/family training, Bed mobility, Gait training, Compensatory technique education          See flowsheet documentation for full assessment, interventions and recommendations. Outcome: Progressing  Note: Prognosis: Good  Problem List: Decreased strength, Decreased range of motion, Decreased endurance, Impaired balance, Decreased mobility  Assessment: pt demosntrated improved functional endurance & mobilty this session, reporting improved pain in LLE during stance phase that made walking easier. She continues to ambulate with gait deviations as noted above & required instructions for upright posture & obstacle negotiaton. She fatigued quickly compared to baseline, but no LOB noted despite increased ambulatory distances. Brief standing rest taken prior to returning to room. pt & daughter educated on location of acute fractures in pelvis that are cause for inceased difficulties related to [place] tasks. Pt encouraged to maintain daily mobiilty to improve strength & functional endurance, especially in light of ongoing monitoring for pleural effusion. Anticipate endurance will continue to improve with daily mobiilty & ongoing PT interventions to address deficits. PT POC& d/c recommendations remain appropriate. Barriers to Discharge: Decreased caregiver support (Pt home alione)     PT Discharge Recommendation: Post acute rehabilitation services    See flowsheet documentation for full assessment.

## 2023-07-14 NOTE — ASSESSMENT & PLAN NOTE
- Patient continues to have SIRS  - Tachycardia, leukocytosis, bandemia  - Afebrile  - Hypotension  - Blood cultures ordered  - Fluid overloaded, concentrated albumin ordered for hypotension  - Continue to monitor

## 2023-07-14 NOTE — ASSESSMENT & PLAN NOTE
- Cholelithiasis with mild wall thickening in the fundus on initial imaging  - Nontender and nondistended abdomen, tolerating a diet  - Negative Altamirano's sign  - RUQ US completed due to leukocytosis without evidence of acute cholecystitis  - Repeat CT abd/pelvis completed as well with re-identification of gallstones  - Continue to monitor

## 2023-07-14 NOTE — ASSESSMENT & PLAN NOTE
- Patient with acute hyponatremia, present on presentation.  - Patient not currently on diuretic therapy, did not receive DDAVP, and appears hypervolemic with bilateral pleural effusions  - Urine studies and serum osmol reviewed.   - Appreciate Nephrology consult and recommendations  - Continue salt tabs 3 times daily, FR 1000 mL, Lasix ordered  - Continue to monitor sodium level with repeat BMP Q6hrs

## 2023-07-14 NOTE — ASSESSMENT & PLAN NOTE
- Pt reports history of multiple rib fractures  - Nontender bilateral, circumferential chest walls. No skin changes  - 7/14 CT Chest: "2. Acute, nondisplaced left anterior second through fourth rib fractures. Old healed right posterolateral eighth and ninth rib fractures. "  - Pt denies chest pain  - Continue to monitor

## 2023-07-14 NOTE — ASSESSMENT & PLAN NOTE
- Continue diuresis per nephrology  - Monitor respiratory status  - Procal pending for possible pneumonia in setting of leukocytosis, will start ABX if indicated  - Continue to monitor

## 2023-07-14 NOTE — PROGRESS NOTES
[address] [facility]  Progress Note  Name: Jihan Singh  MRN: [id number]  Unit/Bed#: [facility] 530-27 I Date of Admission: 7/10/2023   Date of Service: 7/14/2023 I Hospital Day: 3    Assessment/Plan   Bilateral pleural effusion  Assessment & Plan  - Continue diuresis per nephrology  - Monitor respiratory status  - Procal pending for possible pneumonia in setting of leukocytosis, will start ABX if indicated  - Continue to monitor    History of rib fracture  Assessment & Plan  - Pt reports history of multiple rib fractures  - Nontender bilateral, circumferential chest walls. No skin changes  - 7/14 CT Chest: "2. Acute, nondisplaced left anterior second through fourth rib fractures. Old healed right posterolateral eighth and ninth rib fractures. "  - Pt denies chest pain  - Continue to monitor    Cholelithiasis  Assessment & Plan  - Cholelithiasis with mild wall thickening in the fundus on initial imaging  - Nontender and nondistended abdomen, tolerating a diet  - Negative Altamirano's sign  - RUQ US completed due to leukocytosis without evidence of acute cholecystitis  - Repeat CT abd/pelvis completed as well with re-identification of gallstones  - Continue to monitor    SIRS (systemic inflammatory response syndrome) ([address])  Assessment & Plan  - Patient continues to have SIRS  - Tachycardia, leukocytosis, bandemia  - Afebrile  - Hypotension  - Blood cultures ordered  - Fluid overloaded, concentrated albumin ordered for hypotension  - Continue to monitor    Bilateral fracture of pubic rami, sequela  Assessment & Plan  - XR results indicate possible acute left pubic rami fracture in addition to a right sided pubic rami fracture  - appreciate orthopedics consult and recommendations  - continue WBAT  - PT and OT    Fall  Assessment & Plan  - Status post fall with the below noted injuries. - Fall precautions.   - Geriatric Medicine consultation for evaluation, medication review and recommendations.  - PT and OT evaluation and treatment as indicated. - Case Management consultation for disposition planning. 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  - No evidence of MAXX at this time. - Appreciate Nephrology consult and recommendations  - Avoid nephrotoxins and hypotension.  - Outpatient follow-up per routine. Primary hypertension  Assessment & Plan  - Patient with chronic history of hypertension.  - Continue current medication regimen and adjust as indicated. - Outpatient follow-up routine. Hyponatremia  Assessment & Plan  - Patient with acute hyponatremia, present on presentation.  - Patient not currently on diuretic therapy, did not receive DDAVP, and appears hypervolemic with bilateral pleural effusions  - Urine studies and serum osmol reviewed. - Appreciate Nephrology consult and recommendations  - Continue salt tabs 3 times daily, FR 1000 mL, Lasix ordered  - Continue to monitor sodium level with repeat BMP Q6hrs    Closed nondisplaced zone II fracture of sacrum [facility])  Assessment & Plan  - Sacral fracture, present on admission.  - Appreciate Orthopedic surgery evaluation and recommendations.   - Non-operative management  - May remain weightbearing as tolerated on the bilateral lower extremities. - Patient reports continued pain in her left groin that affects her gait related to a recent muscular injury.  - Monitor neurovascular exam.  - Continue multimodal analgesic regimen.  - Continue DVT prophylaxis. - PT and OT evaluation and treatment as indicated. - Outpatient follow up with Orthopedic surgery for re-evaluation.     Acute cystitis without hematuria  Assessment & Plan  - Patient with UTI/acute cystitis without hematuria, present on presentation.   - UA from 7/11/2023 consistent with acute urinary infection; urine culture results pending.   - Additional/contributing data to support UTI includes fever on admission and leukocytosis. - Completed Rocephin for 3 doses through 7/13/2023.  - Continue to monitor temperature trend and CBC/white blood cell count trend. - Outpatient follow-up with PCP. Sepsis due to gram-negative UTI [facility])  Assessment & Plan  - Patient with sepsis secondary to UTI, present on presentation.   - Sepsis now resolving with resuscitation and initiation of IV antibiotics to treat infectious etiology. - Completed Rocephin IV x 3 days  - Continue to monitor hemodynamic status   - Urine culture positive for E Coli  - Outpatient follow-up with PCP. * Generalized weakness  Assessment & Plan  - History of generalized weakness.   - Patient reports some increased weakness in her left lower extremity with ambulation due to left groin pain/injury that occurred approximately 2 weeks ago. - Maintain fall precautions. - Continue PT and OT evaluation and treatment indicated. - Outpatient follow-up with PCP recommended. Bowel Regimen: senokot S  VTE Prophylaxis:Heparin     Disposition: Continue med surg level of care. Disposition to rehab pending medical stabilization    Subjective   Chief Complaint: "I've had a busy day"    Subjective: Patient is awake and oriented, laying in bed comfortably. She reports that she went for some tests this morning and now shes tired. Objective   Vitals:   Temp:  [98.1 °F (36.7 °C)-99.5 °F (37.5 °C)] 98.1 °F (36.7 °C)  HR:  [] 87  Resp:  [14-17] 16  BP: ()/(45-61) 86/45    I/O       07/12 0701  07/13 0700 07/13 0701  07/14 0700 07/14 0701  07/15 0700    P. O. 1050 1900     IV Piggyback  500     Total Intake(mL/kg) 1050 (19.3) 2400 (44.1)     Urine (mL/kg/hr) 250 (0.2) 1525 (1.2)     Stool  0     Total Output 250 1525     Net +800 +875            Unmeasured Urine Occurrence 1 x 1 x     Unmeasured Stool Occurrence 0 x 1 x            Physical Exam:   GENERAL APPEARANCE: Patient in no acute distress.   HEENT: NCAT; PERRL, EOMs intact; Mucous membranes moist  CV: Regular rate and rhythm; murmur appreciated. CHEST / LUNGS: Mild rales in bilateral bases. Nontender chest wall  ABD: NABS; soft; non-distended; non-tender. : Voiding  EXT: +2 pulses bilaterally upper & lower extremities; no edema. NEURO: GCS 15; no focal neurologic deficits; neurovascularly intact. SKIN: Warm, dry and well perfused; no rash; no jaundice. Invasive Devices     Peripheral Intravenous Line  Duration           Peripheral IV 07/11/23 Left Forearm 2 days    Peripheral IV 07/13/23 Dorsal (posterior); Right Forearm 1 day          Drain  Duration           External Urinary Catheter 3 days                      Lab Results: Results: I have personally reviewed all pertinent laboratory/tests results  Imaging: I have personally reviewed pertinent reports. Other Studies:   US right upper quadrant   Final Result by Edilberto Piper MD (07/14 9737)      Cholelithiasis without sonographic findings for acute cholecystitis. Workstation performed: [id number]         CT abdomen pelvis w contrast   Final Result by Geovanna Nelson MD (07/14 0285)      1. Multiple gallstones with mild wall thickening at the gallbladder fundus, similar to the prior study. Correlate with concurrent right upper quadrant ultrasound. 2. Mildly comminuted, nondisplaced left inferior pubic ramus fracture and nondisplaced left superior pubic ramus fracture at the junction with the acetabulum, acute to subacute. 3. Small left greater than right pleural effusions and basilar atelectasis. Possible acute nondisplaced right posterolateral ninth rib fracture. Correlate with same day chest CT. The study was marked in [facility] for immediate notification. Workstation performed: [id number]         CT chest wo contrast   Final Result by Geovanna Nelson MD (07/14 0626)      1. Mild interstitial edema with small left greater than right pleural effusions.  Left greater than right basilar dense consolidation may represent atelectasis, although pneumonia/aspiration is not excluded, especially given large amount of fluid and    debris in the distal esophagus. 2. Acute, nondisplaced left anterior second through fourth rib fractures. Stable T12 compression fracture. Old healed right posterolateral eighth and ninth rib fractures. Workstation performed: [id number]         CT abdomen pelvis wo contrast   Final Result by Keith Brennan MD (07/11 4282)         1. S2 fracture, possibly acute. 2. Chronic T12, L2 and L4 fractures. 3. No subacute intra-abdominal or pelvic trauma. 4. Constipation. 5. Stone filled gallbladder. Mild wall thickening in the fundus. If there is concern for acute cholecystitis, recommend right upper quadrant ultrasound. Workstation performed: [id number]         XR hips bilateral 2 vw w pelvis if performed   Final Result by Meghan Venegas MD (07/11 9377)      Findings are suspicious for pubic rami fractures more likely acute on the left than the right. CT suggested inferior fracture may be chronic; however there appears to be some discontinuity on the frontal pelvis projection. It is possible these were    sustained 2 weeks ago other although difficult to appreciate at that time. Correlation with any point tenderness is advised. Degenerative changes      Findings were discussed with Roberta Arechiga of trauma surgery on 7/11/2023 at 4:20 p.m.       Workstation performed: [id number]         XR chest portable    (Results Pending)

## 2023-07-14 NOTE — ASSESSMENT & PLAN NOTE
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  - No evidence of MAXX at this time. - Appreciate Nephrology consult and recommendations  - Avoid nephrotoxins and hypotension.  - Outpatient follow-up per routine.

## 2023-07-14 NOTE — CASE MANAGEMENT
Case Management Discharge Planning Note    Patient name Lynn Hall  Location [address] [place] [address] [address] [id number] MRN [id number]  : 3/12/1928 Date 2023       Current Admission Date: 7/10/2023  Current Admission Diagnosis:Generalized weakness   Patient Active Problem List    Diagnosis Date Noted   • SIRS (systemic inflammatory response syndrome) ([address]) 2023   • Bilateral fracture of pubic rami, sequela 2023   • Fall 2023   • Generalized weakness 2023   • Sepsis due to gram-negative UTI ([address]) 2023   • Acute cystitis without hematuria 2023   • Closed nondisplaced zone II fracture of sacrum ([address]) 2023   • Hyponatremia 2023   • Anemia 2023   • H/O left nephrectomy 2023   • Raynaud phenomenon 2023   • Arthritis of carpometacarpal (CMC) joint of right thumb 2022   • Primary osteoarthritis of right wrist 2022   • Closed fracture of right proximal humerus 11/10/2022   • Primary hypertension 2014      LOS (days): 3  Geometric Mean LOS (GMLOS) (days): 4.40  Days to GMLOS:1.5     OBJECTIVE:  Risk of Unplanned Readmission Score: 12.5         Current admission status: Inpatient   Preferred Pharmacy:   [address], [address] [place] [id number]  Phone: [phone] Fax: [phone]    Primary Care Provider: Rowena Garg MD    Primary Insurance: [facility]  Secondary Insurance:     DISCHARGE DETAILS:    Pt not medically stable for d/c.  CM informed VIA [facility]. Likely d/c Monday.  Therapy will see over the weekend for updated auth

## 2023-07-14 NOTE — PHYSICAL THERAPY NOTE
Physical Therapy Progress Note     07/14/23 1410   PT Last Visit   PT Visit Date 07/14/23   Note Type   Note Type Treatment   Pain Assessment   Pain Assessment Tool FLACC   Pain Rating: FLACC (Rest) - Face 1   Pain Rating: FLACC (Rest) - Legs 0   Pain Rating: FLACC (Rest) - Activity 0   Pain Rating: FLACC (Rest) - Cry 1   Pain Rating: FLACC (Rest) - Consolability 0   Score: FLACC (Rest) 2   Pain Rating: FLACC (Activity) - Face 1   Pain Rating: FLACC (Activity) - Legs 1   Pain Rating: FLACC (Activity) - Activity 1   Pain Rating: FLACC (Activity) - Cry 1   Pain Rating: FLACC (Activity) - Consolability 0   Score: FLACC (Activity) 4   Restrictions/Precautions   RLE Weight Bearing Per Order WBAT   LLE Weight Bearing Per Order WBAT   Other Precautions Pain; Fall Risk; Chair Alarm;Fluid restriction   Subjective   Subjective pt encountered seated in recliner, pleasant and agreeable to treatment. Reports controlled pain, primarily located in L groin. otherwise has no other complaints with activity. Does demonstrate mild dyspnea with activity, but no other complaints related to the same. Transfers   Sit to Stand 3  Moderate assistance   Additional items Assist x 1; Armrests; Increased time required;Verbal cues   Stand to Sit 4  Minimal assistance   Additional items Assist x 1; Armrests; Increased time required   Ambulation/Elevation   Gait pattern Short stride; Foward flexed; Inconsistent ramón; Shuffling;Decreased foot clearance; Antalgic; Improper Weight shift  (L anteriolateral lean with fatigue)   Gait Assistance 4  Minimal assist   Additional items Assist x 1   Assistive Device Rolling walker   Distance 25' x 2   Balance   Static Sitting Fair +   Static Standing Fair -   Ambulatory Poor +   Activity Tolerance   Activity Tolerance Patient tolerated treatment well;Patient limited by fatigue;Patient limited by pain   Nurse Sudhir Ledesma RN   Assessment   Prognosis Good   Problem List Decreased strength;Decreased range of motion;Decreased endurance; Impaired balance;Decreased mobility   Assessment pt demosntrated improved functional endurance & mobilty this session, reporting improved pain in LLE during stance phase that made walking easier. She continues to ambulate with gait deviations as noted above & required instructions for upright posture & obstacle negotiaton. She fatigued quickly compared to baseline, but no LOB noted despite increased ambulatory distances. Brief standing rest taken prior to returning to room. pt & daughter educated on location of acute fractures in pelvis that are cause for inceased difficulties related to [place] tasks. Pt encouraged to maintain daily mobiilty to improve strength & functional endurance, especially in light of ongoing monitoring for pleural effusion. Anticipate endurance will continue to improve with daily mobiilty & ongoing PT interventions to address deficits. PT POC& d/c recommendations remain appropriate. Goals   Patient Goals to keep doing better   STG Expiration Date 07/21/23   PT Treatment Day 2   Plan   Treatment/Interventions Functional transfer training;LE strengthening/ROM; Therapeutic exercise; Endurance training;Patient/family training;Equipment eval/education; Bed mobility;Gait training   Progress Progressing toward goals   PT Frequency 3-5x/wk   Recommendation   PT Discharge Recommendation Post acute rehabilitation services   Equipment Recommended [facility] Recommended Wheeled walker   AM-PAC Basic Mobility Inpatient   Turning in Flat Bed Without Bedrails 3   Lying on Back to Sitting on Edge of Flat Bed Without Bedrails 3   Moving Bed to Chair 3   Standing Up From Chair Using Arms 2   Walk in Room 3   Climb 3-5 Stairs With Railing 1   Basic Mobility Inpatient Raw Score 15   Basic Mobility Standardized Score 36.97   Highest Level Of Mobility   JH-HLM Goal 4: Move to chair/commode   JH-HLM Achieved 7: Walk 25 feet or more       Jose Nunn PTA    An [facility] Basic Mobility Raw Score less than 17 suggests pt would benefit from post acute rehab. Please also refer to the recommendation of the Physical Therapist for safe discharge planning.

## 2023-07-14 NOTE — PROGRESS NOTES
Nephrology Progress Note     Luisa Stone 80 y.o. female MRN: [id number]  Unit/Bed#: [facility] 607-01 Encounter: [id number]    Reason for Consult: hyponatremia    ASSESSMENT AND PLAN:  1. Hyponatremia: patient on admission presented with sodium of 129 and progressively decreased over the past few days despite salt tablets and isotonic saline boluses. May have been initially caused by poor oral intake initially, possible exacerbated by CHF, as seen as more prominent pulmonary vascular markings on CXR. Could be related to hypothyroidism, adrenal insufficiency, SIADH, malignancy given history of skin cancer. TSH- normal at 2.135  T4- slightly elevated at 1.32  AM Cortisol- slightly elevated 27.7  Uric acid- normal at 5.7  CT Chest scheduled for possible malignancy    7/13- patient was volume overloaded so was given 2x Lasix 40 mg  7/14- sodium at 125    Plan-  -Fluid restriction 1000 mL w/ nutritional supplements  -Lasix for persistent volume overload given wheezing and crackles  -Continue to hold PPI given possible SIADH source  -Continue Pepcid  -Continue NaCl tablets 2g q8h, switched from 1g tablets  -Continue to follow sodium q6h    Principal Problem:    Generalized weakness  Active Problems:    Sepsis due to gram-negative UTI (HCC)    Acute cystitis without hematuria    Closed nondisplaced zone II fracture of sacrum (HCC)    Hyponatremia    Anemia    Primary hypertension    H/O left nephrectomy    Raynaud phenomenon    Fall    Bilateral fracture of pubic rami, sequela      SUBJECTIVE:  Patient seen and examined at bedside. Reports that she still has lower back pain which is being managed with medications and some diarrhea but otherwise has no complaints- denies headaches/lightheadedness, fevers/chills, chest pain, sob, abdominal pain, dysuria.      OBJECTIVE:  Current Weight: Weight - Scale: 54.4 kg (120 lb)  Vitals:    07/14/23 0222   BP: 108/61   Pulse: 100   Resp: 17   Temp: 98.8 °F (37.1 °C)   SpO2: 98% Intake/Output Summary (Last 24 hours) at 7/14/2023 0739  Last data filed at 7/14/2023 0615  Gross per 24 hour   Intake 1920 ml   Output 1525 ml   Net 395 ml     Wt Readings from Last 3 Encounters:   07/11/23 54.4 kg (120 lb)   06/28/23 55.3 kg (122 lb)   02/02/23 56.7 kg (125 lb)     Temp Readings from Last 3 Encounters:   07/14/23 98.8 °F (37.1 °C)   06/28/23 (!) 97.2 °F (36.2 °C)   11/07/22 98 °F (36.7 °C) (Temporal)     BP Readings from Last 3 Encounters:   07/14/23 108/61   02/02/23 128/72   12/22/22 124/80     Pulse Readings from Last 3 Encounters:   07/14/23 100   06/28/23 95   11/07/22 68        Physical Examination:  Physical Exam  Constitutional:       General: She is not in acute distress. Appearance: She is not ill-appearing. HENT:      Head: Normocephalic and atraumatic. Mouth/Throat:      Mouth: Mucous membranes are moist.      Pharynx: Oropharynx is clear. Eyes:      General:         Right eye: No discharge. Left eye: No discharge. Conjunctiva/sclera: Conjunctivae normal.   Cardiovascular:      Rate and Rhythm: Normal rate and regular rhythm. Heart sounds: Murmur (Systolic murmur) heard. Pulmonary:      Effort: Pulmonary effort is normal. No respiratory distress. Breath sounds: Wheezing present. Comments: Crackles and wheezing in middle and lower lobes bilaterally    Abdominal:      General: Abdomen is flat. Bowel sounds are normal. There is no distension. Palpations: Abdomen is soft. Tenderness: There is no abdominal tenderness. Musculoskeletal:      Cervical back: Neck supple. No rigidity. Right lower leg: Edema (Trace) present. Left lower leg: Edema (1+) present. Skin:     General: Skin is warm and dry. Capillary Refill: Capillary refill takes less than 2 seconds. Neurological:      General: No focal deficit present. Mental Status: She is alert.    Psychiatric:         Mood and Affect: Mood normal.         Behavior: Behavior normal.          Medications:    Current Facility-Administered Medications:   •  acetaminophen (TYLENOL) tablet 650 mg, 650 mg, Oral, Q6H [facility], Kevin Lopez MD, 650 mg at 07/14/23 3525  •  famotidine (PEPCID) tablet 20 mg, 20 mg, Oral, Daily, Nancy Gonsalez MD, 20 mg at 07/13/23 1201  •  heparin (porcine) subcutaneous injection 5,000 Units, 5,000 Units, Subcutaneous, Q8H [facility], 5,000 Units at 07/14/23 0612 **AND** [CANCELED] Platelet count, , , Once, Kamille, DO  •  losartan (COZAAR) tablet 25 mg, 25 mg, Oral, Daily, Nancy Gonsalez MD  •  oxyCODONE (ROXICODONE) IR tablet 5 mg, 5 mg, Oral, Q6H PRN, Kevin Lopez MD, 5 mg at 07/14/23 0436  •  oxyCODONE (ROXICODONE) split tablet 2.5 mg, 2.5 mg, Oral, Q6H PRN, Kevin Lopez MD, 2.5 mg at 07/13/23 1312  •  polyethylene glycol (MIRALAX) packet 17 g, 17 g, Oral, Daily, Tyler Rogers PA-C, 17 g at 07/13/23 0920  •  senna-docusate sodium (SENOKOT S) 8.6-50 mg per tablet 1 tablet, 1 tablet, Oral, HS, Errol Divina Rogers PA-C, 1 tablet at 07/12/23 2239  •  sodium chloride tablet 2 g, 2 g, Oral, TID With Meals, Nancy Gonsalez MD, 2 g at 07/13/23 1735    Laboratory Results:  Results from last 7 days   Lab Units 07/14/23  0629 07/14/23  0532 07/14/23  0415 07/13/23  2129 07/13/23  1600 07/13/23  1205 07/13/23  0441 07/12/23  1457 07/12/23  0432 07/11/23  0833 07/11/23  0832 07/10/23  2239   WBC Thousand/uL  --  25.19*  --   --   --   --  20.33*  --  18.19*  --  18.79* 21.58*   HEMOGLOBIN g/dL  --  9.4*  --   --   --   --  8.7*  --  9.4*  --  9.7* 9.8*   HEMATOCRIT %  --  27.7*  --   --   --   --  26.6*  --  27.8*  --  29.4* 29.6*   PLATELETS Thousands/uL  --  224  --   --   --   --  190  --  169  --  148* 150   SODIUM mmol/L  --   --  125* 122* 120* 118* 124* 126* 126*   < >  --  129*   POTASSIUM mmol/L  --   --  4.1 4.2 3.9 4.2 4.0 4.0 4.0   < >  --  4.4   CHLORIDE mmol/L  --   --  97 94* 93* 92* 97 100 101   < >  --  99   CO2 mmol/L  --   -- 21 20* 17* 17* 18* 19* 20*   < >  --  22   BUN mg/dL  --   --  20 19 18 18 19 16 16   < >  --  21   CREATININE mg/dL  --   --  1.02 1.12 0.95 1.05 0.93 1.00 0.92   < >  --  1.12   CALCIUM mg/dL  --   --  8.1* 8.3 8.0* 8.6 8.0* 8.6 8.2*   < >  --  8.7   MAGNESIUM mg/dL 1.1*  --   --   --   --   --   --   --   --   --   --   --    PHOSPHORUS mg/dL 1.5*  --   --   --   --   --   --   --   --   --   --   --     < > = values in this interval not displayed. CT abdomen pelvis wo contrast   Final Result by Asaf Hernandez MD (07/11 1321)         1. S2 fracture, possibly acute. 2. Chronic T12, L2 and L4 fractures. 3. No subacute intra-abdominal or pelvic trauma. 4. Constipation. 5. Stone filled gallbladder. Mild wall thickening in the fundus. If there is concern for acute cholecystitis, recommend right upper quadrant ultrasound. Workstation performed: [id number]         XR hips bilateral 2 vw w pelvis if performed   Final Result by Eleni Vance MD (07/11 1631)      Findings are suspicious for pubic rami fractures more likely acute on the left than the right. CT suggested inferior fracture may be chronic; however there appears to be some discontinuity on the frontal pelvis projection. It is possible these were    sustained 2 weeks ago other although difficult to appreciate at that time. Correlation with any point tenderness is advised. Degenerative changes      Findings were discussed with Ava Iqbal of trauma surgery on 7/11/2023 at 4:20 p.m. Workstation performed: [id number]         XR chest portable    (Results Pending)   CT chest wo contrast    (Results Pending)     Danial Flores, MS4  Nephrology  7/14/2023    Thank you for allowing me to participate in the care of Marcelo Smalls. Please don't hesitate to call, text, email, or TigerText with any questions. This text is generated with voice recognition software. There may be translation, syntax,  or grammatical errors.  If you have any questions, please contact the dictating provider.

## 2023-07-15 PROBLEM — N17.9 AKI (ACUTE KIDNEY INJURY) (HCC): Status: ACTIVE | Noted: 2023-07-15

## 2023-07-15 NOTE — CONSULTS
Consultation - Acute Care Surgery  Bang Avila 80 y.o. female MRN: [id number]  Unit/Bed#: [facility] 732-51 Encounter: [id number]        Assessment/Plan     Assessment:  Herlinda Brower is a 80year old female presenting after experiencing a fall. Surgery was consulted regarding potential acute cholecystitis after her WBC increased to 44,000 this morning. Perianal superficial draining abscess on physical exam.       Plan:  Continue IV Zosyn  Continue diet as tolerated  500 cc LR bolus   F/u urine culture results  Low suspicion for acute cholecystitis   History of Present Illness        HPI:  Bang Avila is a 80 y.o. female who presented after a fall which occurred after she attempted to use her walker after using the toilet. She has a PMH of HTN, GERD, left nephrectomy, and multiple bone fractures. On 7/10 UTI was found and treated with Rocephin. Today, her WBC was 44,000, which was an increase from yesterday (25.19). A RUQ ultrasound showed cholelithiasis without findings consistent with acute cholecystitis. On physical exam, abdominal exam was unremarkable. The patient reports that she feels pain in her RLQ area when she ambulates. The patient was pleasant during the exam.        Review of Systems   Constitutional: Negative for chills and fever. HENT: Negative for ear pain and sore throat. Eyes: Negative for pain and visual disturbance. Respiratory: Negative for cough and shortness of breath. Cardiovascular: Negative for chest pain and palpitations. Gastrointestinal: Negative for abdominal pain and vomiting. Patient mentioned she experiences RLQ pain while ambulating   Genitourinary: Negative for dysuria and hematuria. Musculoskeletal: Negative for arthralgias and back pain. Skin: Negative for color change and rash. Neurological: Negative for seizures and syncope. All other systems reviewed and are negative.       Historical Information   Past Medical History:   Diagnosis Date   • Cancer [facility])     Cervical, Kidney, melanoma Per MRI Screening form 2/5/19   • H/O left nephrectomy 7/11/2023     Past Surgical History:   Procedure Laterality Date   • NEPHRECTOMY Left      Social History   Social History     Substance and Sexual Activity   Alcohol Use Not Currently     Social History     Substance and Sexual Activity   Drug Use Not Currently     Social History     Tobacco Use   Smoking Status Never   Smokeless Tobacco Never     Family History: non-contributory    Meds/Allergies   all medications and allergies reviewed  No Known Allergies    Objective   First Vitals:   Blood Pressure: 124/60 (07/10/23 2203)  Pulse: 101 (07/10/23 2203)  Temperature: 100.4 °F (38 °C) (07/10/23 2207)  Temp Source: Oral (07/10/23 2207)  Respirations: 20 (07/10/23 2203)  Height: 5' (152.4 cm) (07/11/23 1253)  Weight - Scale: 54.4 kg (120 lb) (07/11/23 1253)  SpO2: 95 % (07/10/23 2203)    Current Vitals:   Blood Pressure: 92/52 (07/15/23 1433)  Pulse: 55 (07/15/23 1433)  Temperature: 97.9 °F (36.6 °C) (07/15/23 1408)  Temp Source: Oral (07/14/23 1931)  Respirations: 16 (07/15/23 1433)  Height: 5' (152.4 cm) (07/11/23 1253)  Weight - Scale: 54.4 kg (120 lb) (07/11/23 1253)  SpO2: 91 % (07/15/23 1433)      Intake/Output Summary (Last 24 hours) at 7/15/2023 1516  Last data filed at 7/15/2023 1406  Gross per 24 hour   Intake 1700 ml   Output 325 ml   Net 1375 ml       Invasive Devices     Peripheral Intravenous Line  Duration           Peripheral IV 07/11/23 Left Forearm 3 days    Peripheral IV 07/13/23 Dorsal (posterior); Right Forearm 2 days          Drain  Duration           Urethral Catheter Latex 16 Fr. <1 day                Physical Exam:  General: No acute distress  Neuro: Awake, Alert and Oriented x 3  HEENT:  Normocephalic, atraumatic, moist mucous membranes  CV: S1, S2 normal intensity, no rubs, gallops, murmurs, regular rate and rhythm  Lungs: Normal work of breathing, no increased respiratory effort,  Abdomen: Soft, non-tender, non-distended. Buttocks: Perianal superficial draining abscess (see below)   Extremities: No edema, clubbing or cyanosis  Skin: Warm, dry                Lab Results:   CBC:   Lab Results   Component Value Date    WBC 44.58 (HH) 07/15/2023    HGB 9.0 (L) 07/15/2023    HCT 26.5 (L) 07/15/2023    MCV 85 07/15/2023     07/15/2023    RBC 3.12 (L) 07/15/2023    MCH 28.8 07/15/2023    MCHC 34.0 07/15/2023    RDW 14.3 07/15/2023    MPV 8.8 (L) 07/15/2023   , CMP:   Lab Results   Component Value Date    SODIUM 129 (L) 07/15/2023    K 4.9 07/15/2023     07/15/2023    CO2 18 (L) 07/15/2023    BUN 30 (H) 07/15/2023    CREATININE 1.33 (H) 07/15/2023    CALCIUM 8.6 07/15/2023    EGFR 34 07/15/2023   , Urinalysis:   Lab Results   Component Value Date    COLORU Yellow 07/15/2023    CLARITYU Clear 07/15/2023    SPECGRAV 1.037 (H) 07/15/2023    PHUR 5.0 07/15/2023    LEUKOCYTESUR Moderate (A) 07/15/2023    NITRITE Negative 07/15/2023    GLUCOSEU Negative 07/15/2023    KETONESU Negative 07/15/2023    BILIRUBINUR Negative 07/15/2023    BLOODU Small (A) 07/15/2023     Imaging: I have personally reviewed pertinent reports. EKG, Pathology, and Other Studies: I have personally reviewed pertinent reports.       Code Status: Level 1 - Full Code  Advance Directive and Living Will:      Power of :    POLST:      Jesi Kimball MD  General Surgery Resident

## 2023-07-15 NOTE — ASSESSMENT & PLAN NOTE
- Baseline Cr 1.0  - Cr elevated to 1.33 s/p IV contrast, albumin, lasix x 2  - Pt has been experiencing urinary retention and failed the protocol.  Anticipate russell catheter placement  - Appreciate Nephrology consult and recommendations  - History of left nephrectomy  - Avoid further nephrotoxins  - Currently on a fluid restriction of 1000 mL   - Monitor with daily BMP

## 2023-07-15 NOTE — ASSESSMENT & PLAN NOTE
- Cholelithiasis with mild wall thickening in the fundus on initial imaging  - Nontender and nondistended abdomen, tolerating a diet  - Negative Altamirano's sign  - RUQ US completed due to leukocytosis without evidence of acute cholecystitis  - Repeat CT abd/pelvis completed as well with re-identification of gallstones  - Patient reports she feels hot and sweaty, but afebrile and has some epigastric tenderness. Due to severe elevation of WBC, initiate Rocephin and Flagyl to treat possible acute cholecystitis.   - Continue to monitor

## 2023-07-15 NOTE — PLAN OF CARE
Problem: Potential for Falls  Goal: Patient will remain free of falls  Description: INTERVENTIONS:  - Educate patient/family on patient safety including physical limitations  - Instruct patient to call for assistance with activity   - Consult OT/PT to assist with strengthening/mobility   - Keep Call bell within reach  - Keep bed low and locked with side rails adjusted as appropriate  - Keep care items and personal belongings within reach  - Initiate and maintain comfort rounds  - Make Fall Risk Sign visible to staff  - Offer Toileting every 1 Hours, in advance of need  - Initiate/Maintain alarm  - Obtain necessary fall risk management equipment  - Apply yellow socks and bracelet for high fall risk patients  - Consider moving patient to room near nurses station  Outcome: Progressing     Problem: MOBILITY - ADULT  Goal: Maintain or return to baseline ADL function  Description: INTERVENTIONS:  -  Assess patient's ability to carry out ADLs; assess patient's baseline for ADL function and identify physical deficits which impact ability to perform ADLs (bathing, care of mouth/teeth, toileting, grooming, dressing, etc.)  - Assess/evaluate cause of self-care deficits   - Assess range of motion  - Assess patient's mobility; develop plan if impaired  - Assess patient's need for assistive devices and provide as appropriate  - Encourage maximum independence but intervene and supervise when necessary  - Involve family in performance of ADLs  - Assess for home care needs following discharge   - Consider OT consult to assist with ADL evaluation and planning for discharge  - Provide patient education as appropriate  Outcome: Progressing  Goal: Maintains/Returns to pre admission functional level  Description: INTERVENTIONS:  - Perform BMAT or MOVE assessment daily.   - Set and communicate daily mobility goal to care team and patient/family/caregiver.    - Collaborate with rehabilitation services on mobility goals if consulted  - Perform Range of Motion 3 times a day. - Reposition patient every 2 hours.   - Dangle patient 3 times a day  - Stand patient 3 times a day  - Ambulate patient 3 times a day  - Out of bed to chair 3 times a day   - Out of bed for meals 3 times a day  - Out of bed for toileting  - Record patient progress and toleration of activity level   Outcome: Progressing     Problem: PAIN - ADULT  Goal: Verbalizes/displays adequate comfort level or baseline comfort level  Description: Interventions:  - Encourage patient to monitor pain and request assistance  - Assess pain using appropriate pain scale  - Administer analgesics based on type and severity of pain and evaluate response  - Implement non-pharmacological measures as appropriate and evaluate response  - Consider cultural and social influences on pain and pain management  - Notify physician/advanced practitioner if interventions unsuccessful or patient reports new pain  Outcome: Progressing     Problem: INFECTION - ADULT  Goal: Absence or prevention of progression during hospitalization  Description: INTERVENTIONS:  - Assess and monitor for signs and symptoms of infection  - Monitor lab/diagnostic results  - Monitor all insertion sites, i.e. indwelling lines, tubes, and drains  - Monitor endotracheal if appropriate and nasal secretions for changes in amount and color  - [place] appropriate cooling/warming therapies per order  - Administer medications as ordered  - Instruct and encourage patient and family to use good hand hygiene technique  - Identify and instruct in appropriate isolation precautions for identified infection/condition  Outcome: Progressing  Goal: Absence of fever/infection during neutropenic period  Description: INTERVENTIONS:  - Monitor WBC    Outcome: Progressing     Problem: DISCHARGE PLANNING  Goal: Discharge to home or other facility with appropriate resources  Description: INTERVENTIONS:  - Identify barriers to discharge w/patient and caregiver  - Arrange for needed discharge resources and transportation as appropriate  - Identify discharge learning needs (meds, wound care, etc.)  - Arrange for interpretive services to assist at discharge as needed  - Refer to Case Management Department for coordinating discharge planning if the patient needs post-hospital services based on physician/advanced practitioner order or complex needs related to functional status, cognitive ability, or social support system  Outcome: Progressing     Problem: Knowledge Deficit  Goal: Patient/family/caregiver demonstrates understanding of disease process, treatment plan, medications, and discharge instructions  Description: Complete learning assessment and assess knowledge base. Interventions:  - Provide teaching at level of understanding  - Provide teaching via preferred learning methods  Outcome: Progressing     Problem: Prexisting or High Potential for Compromised Skin Integrity  Goal: Skin integrity is maintained or improved  Description: INTERVENTIONS:  - Identify patients at risk for skin breakdown  - Assess and monitor skin integrity  - Assess and monitor nutrition and hydration status  - Monitor labs   - Assess for incontinence   - Turn and reposition patient  - Assist with mobility/ambulation  - Relieve pressure over bony prominences  - Avoid friction and shearing  - Provide appropriate hygiene as needed including keeping skin clean and dry  - Evaluate need for skin moisturizer/barrier cream  - Collaborate with interdisciplinary team   - Patient/family teaching  - Consider wound care consult   Outcome: Progressing     Problem: Nutrition/Hydration-ADULT  Goal: Nutrient/Hydration intake appropriate for improving, restoring or maintaining nutritional needs  Description: Monitor and assess patient's nutrition/hydration status for malnutrition. Collaborate with interdisciplinary team and initiate plan and interventions as ordered.   Monitor patient's weight and dietary intake as ordered or per policy. Utilize nutrition screening tool and intervene as necessary. Determine patient's food preferences and provide high-protein, high-caloric foods as appropriate.      INTERVENTIONS:  - Monitor oral intake, urinary output, labs, and treatment plans  - Assess nutrition and hydration status and recommend course of action  - Evaluate amount of meals eaten  - Assist patient with eating if necessary   - Allow adequate time for meals  - Recommend/ encourage appropriate diets, oral nutritional supplements, and vitamin/mineral supplements  - Order, calculate, and assess calorie counts as needed  - Recommend, monitor, and adjust tube feedings and TPN/PPN based on assessed needs  - Assess need for intravenous fluids  - Provide specific nutrition/hydration education as appropriate  - Include patient/family/caregiver in decisions related to nutrition  Outcome: Progressing

## 2023-07-15 NOTE — PROGRESS NOTES
[facility]  Progress Note  Name: Disha Levy  MRN: [id number]  Unit/Bed#: [facility] 438-07 I Date of Admission: 7/10/2023   Date of Service: 7/15/2023 I Hospital Day: 4    Assessment/Plan   MAXX (acute kidney injury) ([address])  Assessment & Plan  - Baseline Cr 1.0  - Cr elevated to 1.33 s/p IV contrast, albumin, lasix x 2  - Appreciate Nephrology consult and recommendations  - History of left nephrectomy  - Avoid further nephrotoxins  - Currently on a fluid restriction of 1000 mL   - Monitor with daily BMP    Bilateral pleural effusion  Assessment & Plan  - Continue diuresis per nephrology  - Monitor respiratory status  - Procal elevated to 3.7   - WBC 44   - Continue to monitor    History of rib fracture  Assessment & Plan  - Pt reports history of multiple rib fractures  - Nontender bilateral, circumferential chest walls. No skin changes  - 7/14 CT Chest: "2. Acute, nondisplaced left anterior second through fourth rib fractures. Old healed right posterolateral eighth and ninth rib fractures. "  - Pt denies chest pain  - Encourage IS frequently  - Continue to monitor    Cholelithiasis  Assessment & Plan  - Cholelithiasis with mild wall thickening in the fundus on initial imaging  - Nontender and nondistended abdomen, tolerating a diet  - Negative Altamirano's sign  - RUQ US completed due to leukocytosis without evidence of acute cholecystitis  - Repeat CT abd/pelvis completed as well with re-identification of gallstones  - Patient reports she feels hot and sweaty, but afebrile and has some epigastric tenderness. Due to severe elevation of WBC, initiate Rocephin and Flagyl to treat possible acute cholecystitis. - Continue to monitor    SIRS (systemic inflammatory response syndrome) (HCC)  Assessment & Plan  - Patient continues to have SIRS  - Tachycardia, leukocytosis, bandemia  - Afebrile  - Mild hypotension, SBPs in 100s.  Improved with concentrated albumin  - Blood cultures ordered, NGTD  - Continue to monitor    Bilateral fracture of pubic rami, sequela  Assessment & Plan  - XR results indicate possible acute left pubic rami fracture in addition to a right sided pubic rami fracture  - appreciate orthopedics consult and recommendations  - continue WBAT  - PT and OT    Fall  Assessment & Plan  - Status post fall with the below noted injuries. - Fall precautions. - Geriatric Medicine consultation for evaluation, medication review and recommendations.  - PT and OT evaluation and treatment as indicated. - Case Management consultation for disposition planning. 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 - Appreciate Nephrology consult and recommendations  - Avoid nephrotoxins and hypotension.  - Outpatient follow-up per routine. Primary hypertension  Assessment & Plan  - Patient with chronic history of hypertension.  - Continue current medication regimen and adjust as indicated. - Outpatient follow-up routine. Hyponatremia  Assessment & Plan  - Patient with acute hyponatremia, present on presentation.  - Patient not currently on diuretic therapy, did not receive DDAVP, and appears hypervolemic with bilateral pleural effusions  - Urine studies and serum osmol reviewed. - Appreciate Nephrology consult and recommendations  - Continue salt tabs 3 times daily, FR 1000 mL, Lasix ordered  - Improved to 129  - Continue to monitor sodium level with daily BMP    Closed nondisplaced zone II fracture of sacrum [facility])  Assessment & Plan  - Sacral fracture, present on admission.  - Appreciate Orthopedic surgery evaluation and recommendations.   - Non-operative management  - May remain weightbearing as tolerated on the bilateral lower extremities.     - Patient reports continued pain in her left groin that affects her gait related to a recent muscular injury.  - Monitor neurovascular exam.  - Continue multimodal analgesic regimen.  - Continue DVT prophylaxis. - PT and OT evaluation and treatment as indicated. - Outpatient follow up with Orthopedic surgery for re-evaluation. Sepsis due to gram-negative UTI [facility])  Assessment & Plan  - Patient with sepsis secondary to UTI, present on presentation.   - Sepsis now resolving with resuscitation and initiation of IV antibiotics to treat infectious etiology. - Completed Rocephin IV x 3 days  - Continue to monitor hemodynamic status   - Urine culture positive for E Coli  - Outpatient follow-up with PCP.  - Due to Leukocytosis of 44, repeat UA to confirm resolution of UTI    * Generalized weakness  Assessment & Plan  - History of generalized weakness.   - Patient reports some increased weakness in her left lower extremity with ambulation due to left groin pain/injury that occurred approximately 2 weeks ago. - Maintain fall precautions. - Continue PT and OT evaluation and treatment indicated. - Outpatient follow-up with PCP recommended. Bowel Regimen: Senokot S  VTE Prophylaxis:Heparin     Disposition: Pending medical stabilization. Upward trending leukocytosis, possible acute cholecystitis, initiated IV antibiotics. Subjective   Chief Complaint: "I feel sweaty"    Subjective: Pt reports she feels hot and sweaty this morning after waking up. She reports pelvic and buttock pain. She reports a BM yesterday, feels she is breathing well. Objective   Vitals:   Temp:  [97.4 °F (36.3 °C)-98.4 °F (36.9 °C)] 98.4 °F (36.9 °C)  HR:  [] 104  Resp:  [14-18] 14  BP: ()/(45-75) 97/55    I/O       07/13 0701  07/14 0700 07/14 0701  07/15 0700 07/15 0701  07/16 0700    P. O. 1900 880     IV Piggyback 500 600     Total Intake(mL/kg) 2400 (44.1) 1480 (27.2)     Urine (mL/kg/hr) 1525 (1.2) 575 (0.4)     Stool 0 0     Total Output 1525 575     Net +875 +905            Unmeasured Urine Occurrence 1 x      Unmeasured Stool Occurrence 1 x 1 x            Physical Exam:   GENERAL APPEARANCE: Patient in no acute distress. HEENT: NCAT; PERRL, EOMs intact; Mucous membranes dry; lips appear pale  NECK / BACK: Sacrolumbar pain  CV: Regular rate and rhythm; murmur appreciated. CHEST / LUNGS: Clear to auscultation; no wheezes; minimal rales at the lung bases  ABD: NABS; soft; non-distended; mild tenderness to epigastric  : Urinary retention  EXT: +2 pulses bilaterally upper & lower extremities; no edema. NEURO: GCS 15; no focal neurologic deficits; neurovascularly intact. SKIN: Well perfused, warm and dry skin; no rash; no jaundice. Invasive Devices     Peripheral Intravenous Line  Duration           Peripheral IV 07/11/23 Left Forearm 3 days    Peripheral IV 07/13/23 Dorsal (posterior); Right Forearm 1 day          Drain  Duration           Urethral Catheter Latex 16 Fr. <1 day                      Lab Results:   Results: I have personally reviewed all pertinent laboratory/tests results, BMP/CMP:   Lab Results   Component Value Date    SODIUM 129 (L) 07/15/2023    K 4.9 07/15/2023     07/15/2023    CO2 18 (L) 07/15/2023    BUN 30 (H) 07/15/2023    CREATININE 1.33 (H) 07/15/2023    CALCIUM 8.6 07/15/2023    EGFR 34 07/15/2023    and CBC:   Lab Results   Component Value Date    WBC 44.58 (HH) 07/15/2023    HGB 9.0 (L) 07/15/2023    HCT 26.5 (L) 07/15/2023    MCV 85 07/15/2023     07/15/2023    RBC 3.12 (L) 07/15/2023    MCH 28.8 07/15/2023    MCHC 34.0 07/15/2023    RDW 14.3 07/15/2023    MPV 8.8 (L) 07/15/2023     Imaging: I have personally reviewed pertinent reports. Other Studies:   US right upper quadrant   Final Result by Praveen Fontana MD (07/14 2012)      Cholelithiasis without sonographic findings for acute cholecystitis. Workstation performed: [id number]         CT abdomen pelvis w contrast   Final Result by Maida Schreiber MD (07/14 3710)      1. Multiple gallstones with mild wall thickening at the gallbladder fundus, similar to the prior study.  Correlate with concurrent right upper quadrant ultrasound. 2. Mildly comminuted, nondisplaced left inferior pubic ramus fracture and nondisplaced left superior pubic ramus fracture at the junction with the acetabulum, acute to subacute. 3. Small left greater than right pleural effusions and basilar atelectasis. Possible acute nondisplaced right posterolateral ninth rib fracture. Correlate with same day chest CT. The study was marked in [facility] for immediate notification. Workstation performed: [id number]         CT chest wo contrast   Final Result by Yvon Dixon MD (07/14 1103)      1. Mild interstitial edema with small left greater than right pleural effusions. Left greater than right basilar dense consolidation may represent atelectasis, although pneumonia/aspiration is not excluded, especially given large amount of fluid and    debris in the distal esophagus. 2. Acute, nondisplaced left anterior second through fourth rib fractures. Stable T12 compression fracture. Old healed right posterolateral eighth and ninth rib fractures. Workstation performed: [id number]         CT abdomen pelvis wo contrast   Final Result by Duane Alston MD (07/11 0163)         1. S2 fracture, possibly acute. 2. Chronic T12, L2 and L4 fractures. 3. No subacute intra-abdominal or pelvic trauma. 4. Constipation. 5. Stone filled gallbladder. Mild wall thickening in the fundus. If there is concern for acute cholecystitis, recommend right upper quadrant ultrasound. Workstation performed: [id number]         XR hips bilateral 2 vw w pelvis if performed   Final Result by Oli Macias MD (07/11 9081)      Findings are suspicious for pubic rami fractures more likely acute on the left than the right. CT suggested inferior fracture may be chronic; however there appears to be some discontinuity on the frontal pelvis projection.  It is possible these were    sustained 2 weeks ago other although difficult to appreciate at that time. Correlation with any point tenderness is advised. Degenerative changes      Findings were discussed with Nydia Sites of trauma surgery on 7/11/2023 at 4:20 p.m.       Workstation performed: [id number]         XR chest portable    (Results Pending)

## 2023-07-15 NOTE — ASSESSMENT & PLAN NOTE
- Continue diuresis per nephrology  - Monitor respiratory status  - Procal elevated to 3.7   - WBC 44   - Continue to monitor None Done

## 2023-07-15 NOTE — ASSESSMENT & PLAN NOTE
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 - Appreciate Nephrology consult and recommendations  - Avoid nephrotoxins and hypotension.  - Outpatient follow-up per routine.

## 2023-07-15 NOTE — ASSESSMENT & PLAN NOTE
- Patient with acute hyponatremia, present on presentation.  - Patient not currently on diuretic therapy, did not receive DDAVP, and appears hypervolemic with bilateral pleural effusions  - Urine studies and serum osmol reviewed.   - Appreciate Nephrology consult and recommendations  - Continue salt tabs 3 times daily, FR 1000 mL, Lasix ordered  - Improved to 129  - Continue to monitor sodium level with daily BMP

## 2023-07-15 NOTE — ASSESSMENT & PLAN NOTE
- Pt reports history of multiple rib fractures  - Nontender bilateral, circumferential chest walls. No skin changes  - 7/14 CT Chest: "2. Acute, nondisplaced left anterior second through fourth rib fractures. Old healed right posterolateral eighth and ninth rib fractures. "  - Pt denies chest pain  - Encourage IS frequently  - Continue to monitor

## 2023-07-15 NOTE — ASSESSMENT & PLAN NOTE
- Patient continues to have SIRS  - Tachycardia, leukocytosis, bandemia  - Afebrile  - Mild hypotension, SBPs in 100s.  Improved with concentrated albumin  - Blood cultures ordered, NGTD  - Continue to monitor

## 2023-07-15 NOTE — PROGRESS NOTES
NEPHROLOGY PROGRESS NOTE   Danelle Nowak 80 y.o. female MRN: [id number]  Unit/Bed#: [facility] 607-01 Encounter: [id number]  Reason for Consult: Hyponatremia    ASSESSMENT/PLAN:  1. Hyponatremia, etiology multifactorial.  Noted component due to prerenal given congestive heart failure. Discharge component due to low solute intake. Possible underlying SIADH. Cortisol normal at 27.7. Uric acid 5.7. Urine sodium less than 20.  2. Cardiomyopathy, noted diastolic dysfunction with normal systolic function. Noted dilated IVC. 3. Chronic kidney disease with history of left nephrectomy. Creatinine currently stable at 1.3.  4. Metabolic acidosis with normal anion gap. Stable at 18.  5. Hypophosphatemia, improving at 1.8. Continue with replacement  6. Anemia, current hemoglobin 9.0. PLAN:  · Given cardiomyopathy, noted dilated IVC, discontinue sodium chloride tablets  · Maintain positive fluid restriction although will adjust today with tolvaptan  · Tolvaptan 7.5 mg x 1  · Likely resume maintenance loop diuretic therapy starting tomorrow  · Continue to hold angiotensin receptor blocker  · Continue with phosphorus replacement. · No other changes from nephrology standpoint    SUBJECTIVE:  Seen and examined. Patient offers no new complaints. Denies any chest pain pressure or abdominal pain. Mildly short of breath.     Review of Systems    OBJECTIVE:  Current Weight: Weight - Scale: 54.4 kg (120 lb)  Vitals:    07/14/23 2220 07/15/23 0212 07/15/23 0722 07/15/23 1025   BP: 97/56 98/55 97/55 104/56   BP Location:       Pulse: 100 (!) 111 104 88   Resp: 18 18 14 16   Temp: 98 °F (36.7 °C) (!) 97.4 °F (36.3 °C) 98.4 °F (36.9 °C) 98 °F (36.7 °C)   TempSrc:       SpO2: 98% 97% 91% 97%   Weight:       Height:           Intake/Output Summary (Last 24 hours) at 7/15/2023 1114  Last data filed at 7/15/2023 0601  Gross per 24 hour   Intake 1140 ml   Output 575 ml   Net 565 ml       Physical Exam  Cardiovascular:      Rate and Rhythm: Normal rate and regular rhythm. Pulmonary:      Effort: Pulmonary effort is normal.      Breath sounds: Examination of the right-lower field reveals decreased breath sounds. Examination of the left-lower field reveals decreased breath sounds. Decreased breath sounds present. Abdominal:      General: There is distension. Palpations: Abdomen is soft. Tenderness: There is no abdominal tenderness. Musculoskeletal:      Right lower leg: No edema. Left lower leg: No edema. Skin:     General: Skin is warm and dry. Findings: No rash. Neurological:      Mental Status: She is alert and oriented to person, place, and time.          Medications:    Current Facility-Administered Medications:   •  acetaminophen (TYLENOL) tablet 650 mg, 650 mg, Oral, Q6H [facility], Leonarda Riedel, MD, 650 mg at 07/15/23 0556  •  carbamide peroxide (DEBROX) 6.5 % otic solution 5 drop, 5 drop, Both Ears, BID, Tyler Rogers PA-C, 5 drop at 07/15/23 6971  •  cefTRIAXone (ROCEPHIN) 1,000 mg in dextrose 5 % 50 mL IVPB, 1,000 mg, Intravenous, Q24H, Tyler Rogers PA-C, Last Rate: 100 mL/hr at 07/15/23 1107, 1,000 mg at 07/15/23 1107  •  famotidine (PEPCID) tablet 20 mg, 20 mg, Oral, Daily, Sonny Vicente MD, 20 mg at 07/15/23 0217  •  heparin (porcine) subcutaneous injection 5,000 Units, 5,000 Units, Subcutaneous, Q8H [facility], 5,000 Units at 07/15/23 0556 **AND** [CANCELED] Platelet count, , , Once, [employer], DO  •  metroNIDAZOLE (FLAGYL) IVPB (premix) 500 mg 100 mL, 500 mg, Intravenous, Q8H, Tyler Rogers PA-C, Last Rate: 200 mL/hr at 07/15/23 0923, 500 mg at 07/15/23 6458  •  oxyCODONE (ROXICODONE) IR tablet 5 mg, 5 mg, Oral, Q6H PRN, Leonarda Riedel, MD, 5 mg at 07/14/23 0436  •  oxyCODONE (ROXICODONE) split tablet 2.5 mg, 2.5 mg, Oral, Q6H PRN, Leonarda Riedel, MD, 2.5 mg at 07/13/23 1312  •  polyethylene glycol (MIRALAX) packet 17 g, 17 g, Oral, Daily, Tyler Rogers PA-C, 17 g at 07/13/23 0920  •  senna-docusate sodium (SENOKOT S) 8.6-50 mg per tablet 1 tablet, 1 tablet, Oral, HS, Tyler Rogers PA-C, 1 tablet at 07/14/23 2156  •  sodium chloride tablet 2 g, 2 g, Oral, TID With Meals, Silvia Bernabe MD, 2 g at 07/15/23 1555    Laboratory Results:  Results from last 7 days   Lab Units 07/15/23  0602 07/15/23  0451 07/14/23  2244 07/14/23  1734 07/14/23  1104 07/14/23  0629 07/14/23  0532 07/14/23  0415 07/13/23  2129 07/13/23  1600 07/13/23  1205 07/13/23  0441 07/12/23  1457 07/12/23  0432 07/11/23  0833 07/11/23  0832 07/10/23  2239   WBC Thousand/uL 44.58*  --   --   --   --   --  25.19*  --   --   --   --  20.33*  --  18.19*  --  18.79* 21.58*   HEMOGLOBIN g/dL 9.0*  --   --   --   --   --  9.4*  --   --   --   --  8.7*  --  9.4*  --  9.7* 9.8*   HEMATOCRIT % 26.5*  --   --   --   --   --  27.7*  --   --   --   --  26.6*  --  27.8*  --  29.4* 29.6*   PLATELETS Thousands/uL 275  --   --   --   --   --  224  --   --   --   --  190  --  169  --  148* 150   POTASSIUM mmol/L  --  4.9 4.2 4.3 3.9  --   --  4.1 4.2 3.9   < > 4.0   < > 4.0   < >  --  4.4   CHLORIDE mmol/L  --  102 100 99 97  --   --  97 94* 93*   < > 97   < > 101   < >  --  99   CO2 mmol/L  --  18* 19* 19* 19*  --   --  21 20* 17*   < > 18*   < > 20*   < >  --  22   BUN mg/dL  --  30* 26* 25 23  --   --  20 19 18   < > 19   < > 16   < >  --  21   CREATININE mg/dL  --  1.33* 1.33* 1.20 1.23  --   --  1.02 1.12 0.95   < > 0.93   < > 0.92   < >  --  1.12   CALCIUM mg/dL  --  8.6 8.4 8.3 8.1*  --   --  8.1* 8.3 8.0*   < > 8.0*   < > 8.2*   < >  --  8.7   MAGNESIUM mg/dL  --  2.0  --   --   --  1.1*  --   --   --   --   --   --   --   --   --   --   --    PHOSPHORUS mg/dL  --  1.8*  --   --   --  1.5*  --   --   --   --   --   --   --   --   --   --   --     < > = values in this interval not displayed.

## 2023-07-15 NOTE — ASSESSMENT & PLAN NOTE
- Patient with sepsis secondary to UTI, present on presentation.   - Sepsis now resolving with resuscitation and initiation of IV antibiotics to treat infectious etiology.   - Completed Rocephin IV x 3 days  - Continue to monitor hemodynamic status   - Urine culture positive for E Coli  - Outpatient follow-up with PCP.  - Due to Leukocytosis of 44, repeat UA to confirm resolution of UTI

## 2023-07-16 PROBLEM — A41.9 SEPSIS WITH ACUTE ORGAN DYSFUNCTION (HCC): Status: ACTIVE | Noted: 2023-01-01

## 2023-07-16 PROBLEM — R65.20 SEPSIS WITH ACUTE ORGAN DYSFUNCTION (HCC): Status: ACTIVE | Noted: 2023-01-01

## 2023-07-16 PROBLEM — K61.0 PERIANAL ABSCESS: Status: ACTIVE | Noted: 2023-01-01

## 2023-07-16 PROBLEM — G93.40 ACUTE ENCEPHALOPATHY: Status: ACTIVE | Noted: 2023-07-16

## 2023-07-16 NOTE — ASSESSMENT & PLAN NOTE
- Cholelithiasis with mild wall thickening in the fundus on initial imaging  - Nontender and nondistended abdomen, tolerating a diet  - Negative Altamirano's sign  - RUQ US completed due to leukocytosis without evidence of acute cholecystitis  - Repeat CT abd/pelvis completed as well with re-identification of gallstones  - Patient reports she feels hot and sweaty, but afebrile and has some epigastric tenderness. - 7/15 Due to severe elevation of WBC, initiated Rocephin and Flagyl to treat possible acute cholecystitis. Appreciate General Surgery consult and recommendations.  This therapy was discontinued due to identification of a perianal abscess which is more likely to be causing sepsis  - Continue to monitor

## 2023-07-16 NOTE — PROGRESS NOTES
[address] [facility]  Progress Note  Name: King Marlin  MRN: [id number]  Unit/Bed#: [facility] 770-29 I Date of Admission: 7/10/2023   Date of Service: 7/16/2023 I Hospital Day: 5    Assessment/Plan   Perianal abscess  Assessment & Plan  - 7/15 nursing identified an area of purulent drainage   - Patient was found to have a perianal abscess, purulent drainage expressed  - Appreciate General surgery consult and recommendations  - Bedside washout and debridement, packed  - IV Zosyn initiated  - Monitor fever/ WBC curve  - Leukocytosis continues to rise, at 46,000 today    MAXX (acute kidney injury) ([address])  Assessment & Plan  - Baseline Cr 1.0  - Cr elevated to 1.82 s/p hypotension from sepsis  - Pt has been experiencing urinary retention and failed the protocol. 7/15 russell catheter placement  - Appreciate Nephrology consult and recommendations  - History of left nephrectomy  - Avoid further nephrotoxins  - Currently on a fluid restriction of 1500 mL   - Monitor with daily BMP    Bilateral pleural effusion  Assessment & Plan  - Diuresis per nephrology  - Monitor respiratory status  - Procal elevated to 3.7 likely from effusions rather than pneumonia  - Continue to monitor    History of rib fracture  Assessment & Plan  - Pt reports history of multiple rib fractures  - Nontender bilateral, circumferential chest walls. No skin changes  - 7/14 CT Chest: "2. Acute, nondisplaced left anterior second through fourth rib fractures. Old healed right posterolateral eighth and ninth rib fractures. "  - Pt denies chest pain  - Encourage IS frequently  - Continue to monitor    Cholelithiasis  Assessment & Plan  - Cholelithiasis with mild wall thickening in the fundus on initial imaging  - Nontender and nondistended abdomen, tolerating a diet  - Negative Altamirano's sign  - RUQ US completed due to leukocytosis without evidence of acute cholecystitis  - Repeat CT abd/pelvis completed as well with re-identification of gallstones  - Patient reports she feels hot and sweaty, but afebrile and has some epigastric tenderness. - 7/15 Due to severe elevation of WBC, initiated Rocephin and Flagyl to treat possible acute cholecystitis. Appreciate General Surgery consult and recommendations. This therapy was discontinued due to identification of a perianal abscess which is more likely to be causing sepsis  - Continue to monitor    Sepsis with acute organ dysfunction [facility])  Assessment & Plan  - Patient with SIRS, improved with IV fluids but leukocytosis continues to worsen  - Initially, patient was treated for a UTI with Rocephin IV x 3 days. Then she continued to have worsening leukocytosis. CT chest, abdomen, pelvis was completed   - Tachycardia, hypotension, leukocytosis, bandemia   - Hypotension improved with albumin, 500 cc bolus with LR   - Pt continues to have SBP 100s  - Blood cultures ordered, NGTD  - Elevated creatinine likely due to relative hypotension over the past 24-48 hrs  - Patient is tolerating PO, GCS 14/15  - Ann-anal abscess identified on exam with purulent drainage; appreciate General Surgery consult and interventions   - Continue Zosyn  - Continue to monitor    Bilateral fracture of pubic rami, sequela  Assessment & Plan  - XR results indicate possible acute left pubic rami fracture in addition to a right sided pubic rami fracture  - appreciate orthopedics consult and recommendations  - continue WBAT  - PT and OT    Fall  Assessment & Plan  - Status post fall with the below noted injuries. - Fall precautions. - Geriatric Medicine consultation for evaluation, medication review and recommendations.  - PT and OT evaluation and treatment as indicated. - Case Management consultation for disposition planning.     Primary hypertension  Assessment & Plan  - Patient with chronic history of hypertension.  - Holding losartan and other antihypertensives due to acute hypotension  - Continue current medication regimen and adjust as indicated. - Outpatient follow-up routine. Hyponatremia  Assessment & Plan  - Patient with acute hyponatremia, present on presentation.  - Patient not currently on diuretic therapy, did not receive DDAVP, and appears hypervolemic with bilateral pleural effusions  - Urine studies and serum osmol reviewed. - Appreciate Nephrology consult and recommendations   - Fluid restriction of 1500 mL   - 7/15 discontinued salt tabs, gave 1 dose of Tolvaptan   - Improved to 129   - Consider initiating lasix today  - Continue to monitor sodium level with daily BMP    Closed nondisplaced zone II fracture of sacrum [facility])  Assessment & Plan  - Sacral fracture, present on admission.  - Appreciate Orthopedic surgery evaluation and recommendations.   - Non-operative management  - May remain weightbearing as tolerated on the bilateral lower extremities. - Patient reports continued pain in her left groin that affects her gait related to a recent muscular injury.  - Monitor neurovascular exam.  - Continue multimodal analgesic regimen.  - Continue DVT prophylaxis. - PT and OT evaluation and treatment as indicated. - Outpatient follow up with Orthopedic surgery for re-evaluation. Acute UTI (urinary tract infection)  Assessment & Plan  -UTI, present on presentation.  - Completed Rocephin IV x 3 days  - Continue to monitor hemodynamic status   - Urine culture positive for E Coli  - Outpatient follow-up with PCP.  - 7/15 repeat UA with some evidence of leukocytes  - Pt failed urinary retention protocol, russell in place    * Generalized weakness  Assessment & Plan  - History of generalized weakness.   - Patient reports some increased weakness in her left lower extremity with ambulation due to left groin pain/injury that occurred approximately 2 weeks ago. - Maintain fall precautions. - Continue PT and OT evaluation and treatment indicated. - Outpatient follow-up with PCP recommended. Bowel Regimen: senokot s  VTE Prophylaxis:Heparin     Disposition: SD2 level of care due to acute illness. Patient is more confused and has worsening leukocytosis today, remains afebrile. Daughter at bedside for a treatment plan. We discussed that her severe leukocytosis continues to be evaluated and we do not have one clear answer to the illness. We discussed that the patient's confusion and worsening labs and vital signs indicate worsening illness. Subjective   Chief Complaint: "I'm starting to feel better"    Subjective: This morning, patient reports she was very confused and now she is starting to feel less confused. Daughter is at bedside and reports the patient is still showing signs of confusion even though she is oriented to person, time and place. Patient reports that she feels some shortness of breath and that her legs feel swollen. Objective   Vitals:   Temp:  [97.7 °F (36.5 °C)-99 °F (37.2 °C)] 98.1 °F (36.7 °C)  HR:  [] 91  Resp:  [16] 16  BP: ()/(45-59) 98/56    I/O       07/14 0701  07/15 0700 07/15 0701  07/16 0700 07/16 0701  07/17 0700    P. O. 880 1060     IV Piggyback 600 1050     Total Intake(mL/kg) 1480 (27.2) 2110 (38.8)     Urine (mL/kg/hr) 1675 (1.3) 400 (0.3)     Stool 0 0     Total Output 1675 400     Net -195 +1710            Unmeasured Stool Occurrence 1 x 1 x            Physical Exam:   GENERAL APPEARANCE: Patient appears comfortable, but is confused  HEENT: NCAT; PERRL, EOMs intact; Mucous membranes moist  CV: Regular rate and rhythm; murmur appreciated. CHEST / LUNGS: Clear to auscultation; no wheezes/rales/rhonci. Nontender chest wall  ABD: NABS; soft; non-distended; non-tender. : Qureshi in place  EXT: +2 pulses bilaterally upper & lower extremities; +1 pitting edema bilateral LE  NEURO: GCS 14 due to intermittent confusion; no focal neurologic deficits; neurovascularly intact. SKIN: Warm, dry and well perfused; no rash; no jaundice.       Invasive Devices     Peripheral Intravenous Line  Duration           Peripheral IV 07/13/23 Dorsal (posterior); Right Forearm 2 days    Peripheral IV 07/15/23 Left;Ventral (anterior) Forearm <1 day          Drain  Duration           Urethral Catheter Latex 16 Fr. <1 day                      Lab Results: Results: I have personally reviewed all pertinent laboratory/tests results  Imaging: I have personally reviewed pertinent reports.      Other Studies: none

## 2023-07-16 NOTE — ASSESSMENT & PLAN NOTE
- Patient with acute hyponatremia, present on presentation.  - Patient not currently on diuretic therapy, did not receive DDAVP, and appears hypervolemic with bilateral pleural effusions  - Urine studies and serum osmol reviewed.   - Appreciate Nephrology consult and recommendations   - Fluid restriction of 1500 mL   - 7/15 discontinued salt tabs, gave 1 dose of Tolvaptan   - Improved to 129   - Consider initiating lasix today  - Continue to monitor sodium level with daily BMP

## 2023-07-16 NOTE — PROGRESS NOTES
General Surgery  Progress Note   Elmer Celeste 80 y.o. female MRN: [id number]  Unit/Bed#: [facility] 821-30 Encounter: [id number]    Assessment:  Gabi Cooper is a 80year old female presenting after experiencing a fall. Surgery was consulted regarding potential acute cholecystitis after her WBC increased to 44,000 this morning. Perianal superficial draining abscess on physical exam.      AVSS  WBC 46.09 (44.58)  Hb 9.2 ( 9.0)  Cr 1.82 ( 1.33)      Plan:  -Continue IV Zosyn  -Continue diet as tolerated  -IVF  -wound check today   -F/u urine culture results  -Low suspicion for acute cholecystitis   -Pain/ nausea control PRN  -Incentive Spirometry      Subjective/Objective     Subjective:   Patient seen and examined at bedside, in no acute distress. No acute events overnight. Patient reports pain near incision and drainage site. Patient reports feeling feverish and chills. Patient's pain is well controlled. She denies nausea or vomiting. Passing flatus and having bowel movements. Objective:   Vitals:Blood pressure 101/57, pulse 93, temperature 99 °F (37.2 °C), resp. rate 16, height 5' (1.524 m), weight 54.4 kg (120 lb), SpO2 95 %. Temp (24hrs), Av.1 °F (36.7 °C), Min:97.4 °F (36.3 °C), Max:99 °F (37.2 °C)      I/O        0701  07/15 0700 07/15 0701   0700    P. O. 880 1060    IV Piggyback 600 1050    Total Intake(mL/kg) 1480 (27.2) 2110 (38.8)    Urine (mL/kg/hr) 1675 (1.3) 400 (0.3)    Stool 0 0    Total Output 1675 400    Net -195 +1710          Unmeasured Stool Occurrence 1 x 1 x          Invasive Devices     Peripheral Intravenous Line  Duration           Peripheral IV 23 Left Forearm 4 days    Peripheral IV 23 Dorsal (posterior); Right Forearm 2 days    Peripheral IV 07/15/23 Left;Ventral (anterior) Forearm <1 day          Drain  Duration           Urethral Catheter Latex 16 Fr. <1 day              Physical Exam:   General: No acute distress  Neuro: Awake, Alert and Oriented x 3  HEENT: Normocephalic, atraumatic, moist mucous membranes  CV: Regular rate and rhythm  Lungs: Normal work of breathing, no increased respiratory effort  Abdomen: Soft, non-tender, non-distended.    Buttocks: Perianal superficial draining abscess    Extremities: No edema, clubbing or cyanosis  Skin: Warm, dry    Lab Results:   BMP/CMP:   Lab Results   Component Value Date    SODIUM 129 (L) 07/15/2023    K 4.9 07/15/2023     07/15/2023    CO2 18 (L) 07/15/2023    BUN 30 (H) 07/15/2023    CREATININE 1.33 (H) 07/15/2023    CALCIUM 8.6 07/15/2023    EGFR 34 07/15/2023    and CBC:   Lab Results   Component Value Date    WBC 44.58 (HH) 07/15/2023    HGB 9.0 (L) 07/15/2023    HCT 26.5 (L) 07/15/2023    MCV 85 07/15/2023     07/15/2023    RBC 3.12 (L) 07/15/2023    MCH 28.8 07/15/2023    MCHC 34.0 07/15/2023    RDW 14.3 07/15/2023    MPV 8.8 (L) 07/15/2023     VTE Prophylaxis: Heparin    Stella Kaur MD  7/16/2023  12:51 AM

## 2023-07-16 NOTE — ASSESSMENT & PLAN NOTE
-UTI, present on presentation.  - Completed Rocephin IV x 3 days  - Continue to monitor hemodynamic status   - Urine culture positive for E Coli  - Outpatient follow-up with PCP.  - 7/15 repeat UA with some evidence of leukocytes  - Pt failed urinary retention protocol, russell in place

## 2023-07-16 NOTE — PLAN OF CARE
Problem: OCCUPATIONAL THERAPY ADULT  Goal: Performs self-care activities at highest level of function for planned discharge setting. See evaluation for individualized goals. Description: Treatment Interventions: ADL retraining, Functional transfer training, Endurance training, Patient/family training, Equipment evaluation/education, Compensatory technique education, Energy conservation, Activityengagement          See flowsheet documentation for full assessment, interventions and recommendations. Outcome: Progressing  Note: Limitation: Decreased ADL status, Decreased endurance, Decreased self-care trans, Decreased high-level ADLs  Prognosis: Good  Assessment: Pt seen for participation in  Occupational Therapy session with focus on activity tolerance, functional transfers/mob, toilet transfers and standing tolerance and balance for pt engagement in LB self-care tasks/toileting . Pt cleared by Render Postal for pt participated in OT session. Pt presented sitting out of bed to bedside chair  and agreeable to participate in therapy following pt identifiers confirmed. Pt family member daughter/ Marsha Tan present  And supportive of pt throughout session. Pt require assist with functional transfers 2* pt decreased overall strength. Pt required assist for functional mob with RW  2* deconditioning. SHe was noted to be incont of bowel upon stance from bedside chair and increased time allowed to gain standing balance. Pt required a slow pace for functional mob to/from pt bathroom 2* pt balance. Pt will require post acute rehab service to continue to address these above noted pt deficit which currently impair pt ADL and functional mob. .Pt returned to bedside chair set up post session, chair alarm activated and all needs within pt reach     OT Discharge Recommendation: Post acute rehabilitation services

## 2023-07-16 NOTE — ASSESSMENT & PLAN NOTE
- Baseline Cr 1.0  - Cr elevated to 1.82 s/p hypotension from sepsis  - Pt has been experiencing urinary retention and failed the protocol.  7/15 russell catheter placement  - Appreciate Nephrology consult and recommendations  - History of left nephrectomy  - Avoid further nephrotoxins  - Currently on a fluid restriction of 1500 mL   - Monitor with daily BMP

## 2023-07-16 NOTE — ASSESSMENT & PLAN NOTE
- Diuresis per nephrology  - Monitor respiratory status  - Procal elevated to 3.7 likely from effusions rather than pneumonia  - Continue to monitor

## 2023-07-16 NOTE — ASSESSMENT & PLAN NOTE
- Patient developed acute encephalopathy during admission  - Baseline GCS 15, fully oriented and lives alone  - Likely due to acute sepsis with perianal abscess, possible cholecystitis  - Continue supportive treatment  - Pt requires lights on at night, otherwise she has PTSD episodes from the past. This was discussed with her daughter

## 2023-07-16 NOTE — PROGRESS NOTES
NEPHROLOGY PROGRESS NOTE   Cindy Morel 80 y.o. female MRN: [id number]  Unit/Bed#: [facility] 607-01 Encounter: [id number]  Reason for Consult: Hyponatremia    ASSESSMENT/PLAN:  1. Acute kidney injury, current creatinine of 1.8. Suspected secondary to transient hypotension. Hold diuretic therapy temporarily. Recommend starting midodrine 2.5 mg 3 times daily  2. Hyponatremia, etiology multifactorial, current sodium less than 20. Suspecting component due to volume overload/cardiomyopathy. Hopefully resume loop diuretic therapy next 24 to 48 hours  3. Cardiomyopathy with noted diastolic dysfunction with normal systolic function. Also noted dilated IVC. 4. Chronic kidney disease with history of left nephrectomy. Creatinine at baseline approximately 1.0-1.3  5. Metabolic acidosis, stable. 6. Hypophosphatemia, continue with replacement as needed. 7. Status post fall with rib fracture/sacral fracture  8. Cholelithiasis without evidence of acute cholecystitis. Remains on empiric antibiotic treatment. PLAN:  · Unfortunately renal function worsens with a creatinine of 1.8. Suspect secondary to transient hypotension. Recommend midodrine 2.5 mg 3 times daily. Holding diuretic therapy today, likely resume in the next 24 to 48 hours. Maintain fluid restriction. Avoid sodium chloride tablets given concern for volume overload. SUBJECTIVE:  Seen and examined. Patient awake and alert. Complains of some lower extremity swelling mild shortness of breath especially with exertion. Appetite is stable.     Review of Systems    OBJECTIVE:  Current Weight: Weight - Scale: 54.4 kg (120 lb)  Vitals:    07/15/23 2219 07/16/23 0404 07/16/23 0658 07/16/23 0852   BP: 101/57 101/59 98/56    Pulse: 93 102 91 79   Resp: 16  16    Temp: 99 °F (37.2 °C) 97.7 °F (36.5 °C) 98.1 °F (36.7 °C)    TempSrc:       SpO2: 95% 99% 96% 100%   Weight:       Height:           Intake/Output Summary (Last 24 hours) at 7/16/2023 1012  Last data filed at 7/15/2023 1906  Gross per 24 hour   Intake 2010 ml   Output 400 ml   Net 1610 ml       Physical Exam  Constitutional:       Appearance: She is not ill-appearing. Cardiovascular:      Rate and Rhythm: Normal rate and regular rhythm. Pulmonary:      Effort: Pulmonary effort is normal.      Breath sounds: Rales (Few rales at the bases) present. Abdominal:      General: There is no distension. Palpations: Abdomen is soft. Musculoskeletal:      Right lower leg: Edema present. Left lower leg: Edema present. Skin:     General: Skin is warm and dry. Findings: No rash. Neurological:      Mental Status: She is alert and oriented to person, place, and time.          Medications:    Current Facility-Administered Medications:   •  acetaminophen (TYLENOL) tablet 650 mg, 650 mg, Oral, Q6H [address], Alexander Walsh MD, 650 mg at 07/16/23 0501  •  carbamide peroxide (DEBROX) 6.5 % otic solution 5 drop, 5 drop, Both Ears, BID, Tyler Rogers PA-C, 5 drop at 07/16/23 0803  •  famotidine (PEPCID) tablet 20 mg, 20 mg, Oral, Daily, Peter Ferris MD, 20 mg at 07/16/23 0802  •  heparin (porcine) subcutaneous injection 5,000 Units, 5,000 Units, Subcutaneous, Q8H [address], 5,000 Units at 07/16/23 0502 **AND** [CANCELED] Platelet count, , , Once, [employer], DO  •  HYDROmorphone HCl (DILAUDID) injection 0.2 mg, 0.2 mg, Intravenous, Once PRN, Evy Jarrell MD  •  midodrine (PROAMATINE) tablet 2.5 mg, 2.5 mg, Oral, TID ACAmaya DO, 2.5 mg at 07/16/23 4831  •  oxyCODONE (ROXICODONE) IR tablet 5 mg, 5 mg, Oral, Q6H PRN, Alexander Walsh MD, 5 mg at 07/14/23 0436  •  oxyCODONE (ROXICODONE) split tablet 2.5 mg, 2.5 mg, Oral, Q6H PRN, Alexander Walsh MD, 2.5 mg at 07/13/23 1312  •  piperacillin-tazobactam (ZOSYN) 3.375 g in sodium chloride 0.9 % 100 mL IVPB, 3.375 g, Intravenous, Q12H, Evy Jarrell MD, Last Rate: 200 mL/hr at 07/16/23 0400, 3.375 g at 07/16/23 0400  •  polyethylene glycol (MIRALAX) packet 17 g, 17 g, Oral, Daily, Tyler Rogers PA-C, 17 g at 07/13/23 0920  •  senna-docusate sodium (SENOKOT S) 8.6-50 mg per tablet 1 tablet, 1 tablet, Oral, HS, Tyler Rogers PA-C, 1 tablet at 07/14/23 2156    Laboratory Results:  Results from last 7 days   Lab Units 07/16/23  0438 07/15/23  0602 07/15/23  0451 07/14/23  2244 07/14/23  1734 07/14/23  1104 07/14/23  0629 07/14/23  0532 07/14/23  0415 07/13/23  2129 07/13/23  1205 07/13/23  0441 07/12/23  1457 07/12/23  0432 07/11/23  0833 07/11/23  0832 07/10/23  2239   WBC Thousand/uL 46.09* 44.58*  --   --   --   --   --  25.19*  --   --   --  20.33*  --  18.19*  --  18.79* 21.58*   HEMOGLOBIN g/dL 9.2* 9.0*  --   --   --   --   --  9.4*  --   --   --  8.7*  --  9.4*  --  9.7* 9.8*   HEMATOCRIT % 26.7* 26.5*  --   --   --   --   --  27.7*  --   --   --  26.6*  --  27.8*  --  29.4* 29.6*   PLATELETS Thousands/uL 313 275  --   --   --   --   --  224  --   --   --  190  --  169  --  148* 150   POTASSIUM mmol/L 4.8  --  4.9 4.2 4.3 3.9  --   --  4.1 4.2   < > 4.0   < > 4.0   < >  --  4.4   CHLORIDE mmol/L 104  --  102 100 99 97  --   --  97 94*   < > 97   < > 101   < >  --  99   CO2 mmol/L 19*  --  18* 19* 19* 19*  --   --  21 20*   < > 18*   < > 20*   < >  --  22   BUN mg/dL 45*  --  30* 26* 25 23  --   --  20 19   < > 19   < > 16   < >  --  21   CREATININE mg/dL 1.82*  --  1.33* 1.33* 1.20 1.23  --   --  1.02 1.12   < > 0.93   < > 0.92   < >  --  1.12   CALCIUM mg/dL 8.1*  --  8.6 8.4 8.3 8.1*  --   --  8.1* 8.3   < > 8.0*   < > 8.2*   < >  --  8.7   MAGNESIUM mg/dL  --   --  2.0  --   --   --  1.1*  --   --   --   --   --   --   --   --   --   --    PHOSPHORUS mg/dL 2.6  --  1.8*  --   --   --  1.5*  --   --   --   --   --   --   --   --   --   --     < > = values in this interval not displayed.

## 2023-07-16 NOTE — ASSESSMENT & PLAN NOTE
- 7/15 nursing identified an area of purulent drainage   - Patient was found to have a perianal abscess, purulent drainage expressed  - Appreciate General surgery consult and recommendations  - Bedside washout and debridement, packed  - IV Zosyn initiated  - Monitor fever/ WBC curve  - Leukocytosis continues to rise, at 46,000 today

## 2023-07-16 NOTE — ASSESSMENT & PLAN NOTE
- Patient with chronic history of hypertension.  - Holding losartan and other antihypertensives due to acute hypotension  - Continue current medication regimen and adjust as indicated. - Outpatient follow-up routine.

## 2023-07-16 NOTE — QUICK NOTE
Bedside incision and drainage    Incision and drainage of perianal superficial draining abscess  Patients pain is well controlled. Lidocaine was used to anesthestized the skin prior to incising the perianal abscess. As the abscess was draining a forceps and probe was used to remove necrotic tissue. Wound was packed and gauze with tape was placed over wound. General: NAD  HENT: NCAT MMM  Neck: supple, no JVD  CV: nl rate  Lungs: nl wob.  No resp distress  ABD: Soft, nontender, nondistended  Extrem: No CCE  Neuro: AAOx3         Alysha Beth MD  7/15/2023 9:29 PM

## 2023-07-16 NOTE — ASSESSMENT & PLAN NOTE
- Patient with SIRS, improved with IV fluids but leukocytosis continues to worsen  - Initially, patient was treated for a UTI with Rocephin IV x 3 days. Then she continued to have worsening leukocytosis.  CT chest, abdomen, pelvis was completed   - Tachycardia, hypotension, leukocytosis, bandemia   - Hypotension improved with albumin, 500 cc bolus with LR   - Pt continues to have SBP 100s  - Blood cultures ordered, NGTD  - Elevated creatinine likely due to relative hypotension over the past 24-48 hrs  - Patient is tolerating PO, GCS 14/15  - Ann-anal abscess identified on exam with purulent drainage; appreciate General Surgery consult and interventions   - Continue Zosyn  - Continue to monitor

## 2023-07-16 NOTE — PLAN OF CARE
Problem: Potential for Falls  Goal: Patient will remain free of falls  Description: INTERVENTIONS:  - Educate patient/family on patient safety including physical limitations  - Instruct patient to call for assistance with activity   - Consult OT/PT to assist with strengthening/mobility   - Keep Call bell within reach  - Keep bed low and locked with side rails adjusted as appropriate  - Keep care items and personal belongings within reach  - Initiate and maintain comfort rounds  - Make Fall Risk Sign visible to staff  - Offer Toileting every 1 Hours, in advance of need  - Initiate/Maintain alarm  - Obtain necessary fall risk management equipment  - Apply yellow socks and bracelet for high fall risk patients  - Consider moving patient to room near nurses station  Outcome: Progressing     Problem: MOBILITY - ADULT  Goal: Maintain or return to baseline ADL function  Description: INTERVENTIONS:  -  Assess patient's ability to carry out ADLs; assess patient's baseline for ADL function and identify physical deficits which impact ability to perform ADLs (bathing, care of mouth/teeth, toileting, grooming, dressing, etc.)  - Assess/evaluate cause of self-care deficits   - Assess range of motion  - Assess patient's mobility; develop plan if impaired  - Assess patient's need for assistive devices and provide as appropriate  - Encourage maximum independence but intervene and supervise when necessary  - Involve family in performance of ADLs  - Assess for home care needs following discharge   - Consider OT consult to assist with ADL evaluation and planning for discharge  - Provide patient education as appropriate  Outcome: Progressing  Goal: Maintains/Returns to pre admission functional level  Description: INTERVENTIONS:  - Perform BMAT or MOVE assessment daily.   - Set and communicate daily mobility goal to care team and patient/family/caregiver.    - Collaborate with rehabilitation services on mobility goals if consulted  - Perform Range of Motion 3 times a day. - Reposition patient every 2 hours.   - Dangle patient 3 times a day  - Stand patient 3 times a day  - Ambulate patient 3 times a day  - Out of bed to chair 3 times a day   - Out of bed for meals 3 times a day  - Out of bed for toileting  - Record patient progress and toleration of activity level   Outcome: Progressing     Problem: PAIN - ADULT  Goal: Verbalizes/displays adequate comfort level or baseline comfort level  Description: Interventions:  - Encourage patient to monitor pain and request assistance  - Assess pain using appropriate pain scale  - Administer analgesics based on type and severity of pain and evaluate response  - Implement non-pharmacological measures as appropriate and evaluate response  - Consider cultural and social influences on pain and pain management  - Notify physician/advanced practitioner if interventions unsuccessful or patient reports new pain  Outcome: Progressing     Problem: INFECTION - ADULT  Goal: Absence or prevention of progression during hospitalization  Description: INTERVENTIONS:  - Assess and monitor for signs and symptoms of infection  - Monitor lab/diagnostic results  - Monitor all insertion sites, i.e. indwelling lines, tubes, and drains  - Monitor endotracheal if appropriate and nasal secretions for changes in amount and color  - [place] appropriate cooling/warming therapies per order  - Administer medications as ordered  - Instruct and encourage patient and family to use good hand hygiene technique  - Identify and instruct in appropriate isolation precautions for identified infection/condition  Outcome: Progressing  Goal: Absence of fever/infection during neutropenic period  Description: INTERVENTIONS:  - Monitor WBC    Outcome: Progressing     Problem: DISCHARGE PLANNING  Goal: Discharge to home or other facility with appropriate resources  Description: INTERVENTIONS:  - Identify barriers to discharge w/patient and caregiver  - Arrange for needed discharge resources and transportation as appropriate  - Identify discharge learning needs (meds, wound care, etc.)  - Arrange for interpretive services to assist at discharge as needed  - Refer to Case Management Department for coordinating discharge planning if the patient needs post-hospital services based on physician/advanced practitioner order or complex needs related to functional status, cognitive ability, or social support system  Outcome: Progressing     Problem: Knowledge Deficit  Goal: Patient/family/caregiver demonstrates understanding of disease process, treatment plan, medications, and discharge instructions  Description: Complete learning assessment and assess knowledge base. Interventions:  - Provide teaching at level of understanding  - Provide teaching via preferred learning methods  Outcome: Progressing     Problem: Prexisting or High Potential for Compromised Skin Integrity  Goal: Skin integrity is maintained or improved  Description: INTERVENTIONS:  - Identify patients at risk for skin breakdown  - Assess and monitor skin integrity  - Assess and monitor nutrition and hydration status  - Monitor labs   - Assess for incontinence   - Turn and reposition patient  - Assist with mobility/ambulation  - Relieve pressure over bony prominences  - Avoid friction and shearing  - Provide appropriate hygiene as needed including keeping skin clean and dry  - Evaluate need for skin moisturizer/barrier cream  - Collaborate with interdisciplinary team   - Patient/family teaching  - Consider wound care consult   Outcome: Progressing     Problem: Nutrition/Hydration-ADULT  Goal: Nutrient/Hydration intake appropriate for improving, restoring or maintaining nutritional needs  Description: Monitor and assess patient's nutrition/hydration status for malnutrition. Collaborate with interdisciplinary team and initiate plan and interventions as ordered.   Monitor patient's weight and dietary intake as ordered or per policy. Utilize nutrition screening tool and intervene as necessary. Determine patient's food preferences and provide high-protein, high-caloric foods as appropriate.      INTERVENTIONS:  - Monitor oral intake, urinary output, labs, and treatment plans  - Assess nutrition and hydration status and recommend course of action  - Evaluate amount of meals eaten  - Assist patient with eating if necessary   - Allow adequate time for meals  - Recommend/ encourage appropriate diets, oral nutritional supplements, and vitamin/mineral supplements  - Order, calculate, and assess calorie counts as needed  - Recommend, monitor, and adjust tube feedings and TPN/PPN based on assessed needs  - Assess need for intravenous fluids  - Provide specific nutrition/hydration education as appropriate  - Include patient/family/caregiver in decisions related to nutrition  Outcome: Progressing

## 2023-07-16 NOTE — OCCUPATIONAL THERAPY NOTE
Occupational Therapy Treatment Note     07/16/23 1049   OT Last Visit   OT Visit Date 07/16/23   Note Type   Note Type Treatment   Pain Assessment   Pain Assessment Tool 0-10   Pain Score No Pain   Restrictions/Precautions   Weight Bearing Precautions Per Order Yes   RLE Weight Bearing Per Order WBAT   LLE Weight Bearing Per Order WBAT   Lifestyle   Autonomy Pt I with ADLs and functional mobility prior to admission. Pt recieves assistance with IADLs such as dricing. Reciprocal Relationships Pt receives assistance from supportive daughter. Service to Others Pt is retired. Intrinsic Gratification Pt enjoys watching TV. ADL   Where Assessed Commode  (commode set up over standard toilet)   Functional Mobility   Functional Mobility 4  Minimal assistance   Additional Comments assist x 1   Additional items Rolling walker   Toilet Transfers   Toilet Transfer From [place] Type To and from   Toilet Transfer to Raised toilet seat with rails  (Commode placed over toilet)   Toilet Transfer Technique Ambulating   Toilet Transfers Minimal assistance   Subjective   Subjective pt stated " I think it's the 19th"   Cognition   Overall Cognitive Status Impaired   Arousal/Participation Alert; Responsive; Cooperative   Attention Attends with cues to redirect   Orientation Level Oriented to person;Oriented to place;Oriented to situation  (delirium)   Memory Within functional limits; Unable to assess   Following Commands Follows multistep commands with increased time or repetition   Activity Tolerance   Activity Tolerance Patient tolerated treatment well   Assessment   Assessment Pt seen for participation in  Occupational Therapy session with focus on activity tolerance, functional transfers/mob, toilet transfers and standing tolerance and balance for pt engagement in LB self-care tasks/toileting . Pt cleared by Chacorta Salinas for pt participated in OT session.    Pt presented sitting out of bed to bedside chair and agreeable to participate in therapy following pt identifiers confirmed. Pt family member daughter/ [facility] present  And supportive of pt throughout session. Pt require assist with functional transfers 2* pt decreased overall strength. Pt required assist for functional mob with RW  2* deconditioning. SHe was noted to be incont of bowel upon stance from bedside chair and increased time allowed to gain standing balance. Pt required a slow pace for functional mob to/from pt bathroom 2* pt balance. Pt will require post acute rehab service to continue to address these above noted pt deficit which currently impair pt ADL and functional mob. .Pt returned to bedside chair set up post session, chair alarm activated and all needs within pt reach   Plan   Treatment Interventions ADL retraining;Functional transfer training;Cognitive reorientation; Endurance training   Goal Expiration Date 07/25/23   OT Treatment Day 2   OT Frequency 3-5x/wk   Recommendation   OT Discharge Recommendation Post acute rehabilitation services   AM-PAC Daily Activity Inpatient   Lower Body Dressing 3   Bathing 3   Toileting 3   Upper Body Dressing 3   Grooming 3   Eating 4   Daily Activity Raw Score 19   Daily Activity Standardized Score (Calc for Raw Score >=11) 40.22   AM-PAC Applied Cognition Inpatient   Following a Speech/Presentation 4   Understanding Ordinary Conversation 4   Taking Medications 3   Remembering Where Things Are Placed or Put Away 3   Remembering List of 4-5 Errands 3   Taking Care of Complicated Tasks 3   Applied Cognition Raw Score 20   Applied Cognition Standardized Score 41.76   Barthel Index   Feeding 10   Bathing 0   Grooming Score 0   Dressing Score 5   Bladder Score 0   Bowels Score 0   Toilet Use Score 5   Transfers (Bed/Chair) Score 5   Mobility (Level Surface) Score 10   Stairs Score 0   Barthel Index Score 35   Modified Adam Scale   Modified Adam Scale 4       Cheryle Searle COTA/L

## 2023-07-17 NOTE — PROGRESS NOTES
[facility]  Progress Note  Name: Marcos Irving  MRN: [id number]  Unit/Bed#: ICU 14 I Date of Admission: 7/10/2023   Date of Service: 7/17/2023 I Hospital Day: 6    Assessment/Plan   Acute encephalopathy  Assessment & Plan  - Patient developed acute encephalopathy during admission  - Baseline GCS 15, fully oriented and lives alone  - Worsening confusion over the past 48 hrs  - Continue supportive treatment  - Pt requires lights on at night, otherwise she has PTSD episodes from the past. This was discussed with her daughter  - Acute encephalopathy due to acute necrotizing infection   - Continue to monitor    Perianal abscess  Assessment & Plan  - 7/15 nursing identified an area of purulent drainage   - Patient was found to have a perianal abscess, purulent drainage expressed  - Appreciate General surgery consult and recommendations  - Bedside washout and debridement, packed  - IV Zosyn initiated  - Monitor fever/ WBC curve  - Leukocytosis continues to rise, at 49,000 today  - CT pelvis completed which indicated necrotizing infection. Patient taken to OR. MAXX (acute kidney injury) ([address])  Assessment & Plan  - Baseline Cr 1.0  - Cr elevated to 1.82 s/p hypotension from sepsis  - Pt has been experiencing urinary retention and failed the protocol. 7/15 russell catheter placement  - Appreciate Nephrology consult and recommendations  - History of left nephrectomy  - Avoid further nephrotoxins  - Currently on a fluid restriction of 1500 mL   - Monitor with daily BMP    Bilateral pleural effusion  Assessment & Plan  - Diuresis per nephrology  - Monitor respiratory status  - Procal elevated to 3.7 likely from effusions rather than pneumonia  - Continue to monitor    History of rib fracture  Assessment & Plan  - Pt reports history of multiple rib fractures  - Nontender bilateral, circumferential chest walls.  No skin changes  - 7/14 CT Chest: "2. Acute, nondisplaced left anterior second through fourth rib fractures. Old healed right posterolateral eighth and ninth rib fractures. "  - Pt denies chest pain  - Encourage IS frequently  - Continue to monitor    Cholelithiasis  Assessment & Plan  - Cholelithiasis with mild wall thickening in the fundus on initial imaging  - Nontender and nondistended abdomen, tolerating a diet  - Negative Altamirano's sign  - RUQ US completed due to leukocytosis without evidence of acute cholecystitis  - Repeat CT abd/pelvis completed as well with re-identification of gallstones  - Patient reports she feels hot and sweaty, but afebrile and has some epigastric tenderness. - 7/15 Due to severe elevation of WBC, initiated Rocephin and Flagyl to treat possible acute cholecystitis. Appreciate General Surgery consult and recommendations. This therapy was discontinued due to identification of a perianal abscess which is more likely to be causing sepsis  - 7/16 HIDA scan negative  - Low suspicion for acute cholecystitis  - Continue to monitor    Sepsis with acute organ dysfunction [facility])  Assessment & Plan  - Patient with SIRS, improved with IV fluids but leukocytosis continues to worsen  - Initially, patient was scanned with CT abd/pelvis and treated for a UTI with Rocephin IV x 3 days. Then she continued to have worsening leukocytosis. - 7/14 CT chest, abdomen, pelvis was completed to evaluate for signs of infection   - Tachycardia, hypotension, leukocytosis, bandemia   - Hypotension improved with albumin, 500 cc bolus with LR   - Pt continues to have SBP 100s  - Blood cultures ordered, NGTD  - Elevated creatinine likely due to relative hypotension due to sepsis  - Patient is tolerating PO, GCS 14/15.  Confusion worsening  - Ann-anal abscess identified on exam with purulent drainage; appreciate General Surgery consult and interventions   - Continue Zosyn  - 7/17 Repeat CT Pelvis: nectrotizing infection in the right gluteal region extending to the thigh and groin region  - Worsening leukocytosis  - Emergently taken to the OR for washout and debridement of the necrotizing faciitis  - Upgrade to critical care post-operatively  - Goals of care discussed with daughter/ POA [facility] - Patient will be level 3 DNR/DNI for remainder of admission. We discussed that this infection is life threatening and she expressed understanding of this critical finding and recommended treatment with the associated risks. Bilateral fracture of pubic rami, sequela  Assessment & Plan  - XR results indicate possible acute left pubic rami fracture in addition to a right sided pubic rami fracture  - appreciate orthopedics consult and recommendations  - continue WBAT  - PT and OT    Fall  Assessment & Plan  - Status post fall with the below noted injuries. - Fall precautions. - Geriatric Medicine consultation for evaluation, medication review and recommendations.  - PT and OT evaluation and treatment as indicated. - Case Management consultation for disposition planning. Primary hypertension  Assessment & Plan  - Patient with chronic history of hypertension.  - Holding losartan and other antihypertensives due to acute hypotension  - Continue current medication regimen and adjust as indicated. - Outpatient follow-up routine. Hyponatremia  Assessment & Plan  - Patient with acute hyponatremia, present on presentation.  - Patient not currently on diuretic therapy, did not receive DDAVP, and appears hypervolemic with bilateral pleural effusions  - Urine studies and serum osmol reviewed.   - Appreciate Nephrology consult and recommendations   - Fluid restriction of 1500 mL   - 7/15 discontinued salt tabs, gave 1 dose of Tolvaptan   - Improved  - Continue to monitor sodium level with daily BMP    Closed nondisplaced zone II fracture of sacrum [facility])  Assessment & Plan  - Sacral fracture, present on admission.  - Appreciate Orthopedic surgery evaluation and recommendations.   - Non-operative management  - May remain weightbearing as tolerated on the bilateral lower extremities. - Patient reports continued pain in her left groin that affects her gait related to a recent muscular injury.  - Monitor neurovascular exam.  - Continue multimodal analgesic regimen.  - Continue DVT prophylaxis. - PT and OT evaluation and treatment as indicated. - Outpatient follow up with Orthopedic surgery for re-evaluation. Acute UTI (urinary tract infection)  Assessment & Plan  -UTI, present on presentation.  - Completed Rocephin IV x 3 days  - Continue to monitor hemodynamic status   - Urine culture positive for E Coli  - Outpatient follow-up with PCP.  - 7/15 repeat UA with some evidence of leukocytes  - Pt failed urinary retention protocol, russell in place    * Generalized weakness  Assessment & Plan  - History of generalized weakness.   - Patient reports some increased weakness in her left lower extremity with ambulation due to left groin pain/injury that occurred approximately 2 weeks ago. - Maintain fall precautions. - Continue PT and OT evaluation and treatment indicated. - Outpatient follow-up with PCP recommended. Bowel Regimen: Senokot S  VTE Prophylaxis:Heparin     Disposition: CRITICAL CARE POST OP    Subjective   Chief Complaint: Confused    Subjective: Patient is more confused today. Daughter is at bedside and reports the patient thinks that she's in a science experiment. Patient reports buttocks pain. She is breathing well on room air, appears more pale today. Objective   Vitals:   Temp:  [97.6 °F (36.4 °C)-98.1 °F (36.7 °C)] 98 °F (36.7 °C)  HR:  [] 91  Resp:  [16-18] 18  BP: ()/(53-57) 92/53  FiO2 (%):  [40] 40    I/O       07/15 0701  07/16 0700 07/16 0701  07/17 0700 07/17 0701  07/18 0700    P. O. 1060 1020     IV Piggyback 1050 100     Total Intake(mL/kg) 2110 (38.8) 1120 (20.6)     Urine (mL/kg/hr) 400 (0.3) 950 (0.7)     Stool 0 0     Total Output 400 950     Net +1710 +170 Unmeasured Stool Occurrence 1 x 1 x            Physical Exam:   GENERAL APPEARANCE: Patient in acute distress, ill appearing  HEENT: NCAT; PERRL, EOMs intact; Mucous membranes moist  CV: Tachycardia with regular rhythm; no murmur/gallops/rubs appreciated. CHEST / LUNGS: Clear to auscultation; no wheezes/rales/rhonci. ABD: NABS; soft; non-distended; non-tender. : Qurehsi in place  EXT: +2 pulses bilaterally upper & lower extremities; RLE edema   NEURO: GCS 14 due to acute confusion; no focal neurologic deficits; neurovascularly intact. SKIN: Open, draining wound on right buttock      Invasive Devices     Peripheral Intravenous Line  Duration           Peripheral IV 07/13/23 Dorsal (posterior); Right Forearm 4 days    Peripheral IV 07/15/23 Left;Ventral (anterior) Forearm 1 day          Arterial Line  Duration           Arterial Line 07/17/23 Left Radial <1 day          Drain  Duration           Urethral Catheter Latex 16 Fr. 2 days          Airway  Duration           ETT  Cuffed;Oral;Inflated 7 mm <1 day                      Lab Results: Results: I have personally reviewed all pertinent laboratory/tests results  Imaging: I have personally reviewed pertinent reports.      Other Studies: OR Today

## 2023-07-17 NOTE — ANESTHESIA PREPROCEDURE EVALUATION
Procedure:  DEBRIDEMENT WOUND [facility] OUT) (Perineum)   Admitted 7/10 after fall, found to have UTI which was treated. Patient continued to meet SIRS criteria and found to have a perianal abscess on physical exam  Sepsis 2/2 perianal abscess -today imaging showed presence of air. WBC still elevated. Lactate pending  Hyponatremia thought to be 2/2 volume overload, received tolvaptan    On zosyn last dose 7/17 at [address], at 8am - 1/2 pancake + ensure  Ok with intubation for the procedure, but remains DNR. Discussed risks/benefits with daughter. All questions answered    Plan for RSI + pre induction arterial line given that patient is not NPO appropriate/emergent procedure and severe AS    Relevant Problems   CARDIO   (+) Primary hypertension      /RENAL   (+) MAXX (acute kidney injury) ([address])      HEMATOLOGY   (+) Anemia      MUSCULOSKELETAL   (+) Arthritis of carpometacarpal (CMC) joint of right thumb   (+) Primary osteoarthritis of right wrist      PULMONARY   (+) Bilateral pleural effusion   Cirrhosis noted on imaging    Lab Results   Component Value Date    WBC 49.23 (HH) 07/17/2023    HGB 9.0 (L) 07/17/2023    HCT 26.4 (L) 07/17/2023    MCV 86 07/17/2023     07/17/2023     Lab Results   Component Value Date    SODIUM 131 (L) 07/17/2023    K 5.0 07/17/2023     07/17/2023    CO2 20 (L) 07/17/2023    BUN 53 (H) 07/17/2023    CREATININE 1.68 (H) 07/17/2023    GLUC 75 07/17/2023    CALCIUM 8.4 07/17/2023       Hx of renal cancer s/p left nephrectomy    Echo 6/2021  •  Left ventricle is normal in size. There is mild concentric hypertrophy. Systolic function is hyperdynamic with an ejection fraction over 65%. Wall   motion is within normal limits. There is grade I (mild) diastolic   dysfunction. •  Right ventricle cavity is mildly dilated. Systolic function is normal.   •  Mitral valve structure is normal. There is severe posterior annular   calcification. There is mild regurgitation.  There is no evidence of mitral   valve stenosis. •  The aortic valve was not well visualized. The aortic valve is   trileaflet. The leaflets are severely calcified. There is trace   regurgitation. There is severe stenosis. Peak velocity 4.2m/s, mean   gradient 38mmHg. DEANDRE 0.9cm2. •  Pulmonic Valve: The estimated pulmonary artery systolic pressure is   88.1 mmHg. Normal pulmonary artery pressure       Physical Exam    Airway    Mallampati score: III  TM Distance: <3 FB  Neck ROM: full     Dental       Cardiovascular  Rhythm: regular, Rate: normal, Murmur,     Pulmonary  Breath sounds clear to auscultation,     Other Findings        Anesthesia Plan  ASA Score- 4 Emergent    Anesthesia Type- general with ASA Monitors. Additional Monitors: arterial line. Airway Plan: ETT. Comment: Risks/benefits and alternatives discussed with patient including possible PONV, sore throat, damage to teeth/lips/gums/esophagus, and possibility of rare anesthetic and surgical emergencies including but not limited to heart attack, stroke, and/or death. All questions were answered. .       Plan Factors-    Chart reviewed. Existing labs reviewed. Patient summary reviewed. Patient is not a current smoker. Induction- rapid sequence induction. Postoperative Plan- Plan for postoperative opioid use. Planned trial extubation    Informed Consent- Anesthetic plan and risks discussed with patient. I personally reviewed this patient with the CRNA. Discussed and agreed on the Anesthesia Plan with the CRNA. Eliane Ring

## 2023-07-17 NOTE — PROCEDURES
Central Line Insertion    Date/Time: 7/17/2023 4:53 PM    Performed by: Towanda Schilder, MD  Authorized by: Towanda Schilder, MD    Patient location:  ICU  Consent:     Consent obtained:  Written    Consent given by: daughter.     Risks discussed:  Arterial puncture, bleeding, infection and pneumothorax    Alternatives discussed:  No treatment  Universal protocol:     Procedure explained and questions answered to patient or proxy's satisfaction: yes      Relevant documents present and verified: yes      Test results available and properly labeled: yes      Immediately prior to procedure, a time out was called: yes      Patient identity confirmed:  Arm band  Pre-procedure details:     Hand hygiene: Hand hygiene performed prior to insertion      Skin preparation:  2% chlorhexidine    Skin preparation agent: Skin preparation agent completely dried prior to procedure    Indications:     Central line indications: medications requiring central line

## 2023-07-17 NOTE — ANESTHESIA PROCEDURE NOTES
Arterial Line Insertion    Performed by: Betsy Noble  Authorized by: Samina Mustafa DO  Consent: Verbal consent obtained. Written consent obtained. Risks and benefits: risks, benefits and alternatives were discussed  Consent given by: patient  Patient understanding: patient states understanding of the procedure being performed  Patient consent: the patient's understanding of the procedure matches consent given  Procedure consent: procedure consent matches procedure scheduled  Relevant documents: relevant documents present and verified  Test results: test results available and properly labeled  Site marked: the operative site was marked  Radiology Images: Radiology Images displayed and confirmed. If images not available, report reviewed  Patient identity confirmed: verbally with patient, arm band and hospital-assigned identification number  Preparation: Patient was prepped and draped in the usual sterile fashion.   Indications: hemodynamic monitoring  Orientation:  Left  Location: radial artery  Procedure Details:  Needle gauge: 20  Number of attempts: 2    Post-procedure:  Post-procedure: dressing applied  Waveform: good waveform  Post-procedure CNS: normal and unchanged  Patient tolerance: Patient tolerated the procedure well with no immediate complications

## 2023-07-17 NOTE — ASSESSMENT & PLAN NOTE
- Patient developed acute encephalopathy during admission  - Baseline GCS 15, fully oriented and lives alone  - Worsening confusion over the past 48 hrs  - Continue supportive treatment  - Pt requires lights on at night, otherwise she has PTSD episodes from the past. This was discussed with her daughter  - Acute encephalopathy due to acute necrotizing infection   - Continue to monitor

## 2023-07-17 NOTE — ASSESSMENT & PLAN NOTE
- Patient with SIRS, improved with IV fluids but leukocytosis continues to worsen  - Initially, patient was scanned with CT abd/pelvis and treated for a UTI with Rocephin IV x 3 days. Then she continued to have worsening leukocytosis. - 7/14 CT chest, abdomen, pelvis was completed to evaluate for signs of infection   - Tachycardia, hypotension, leukocytosis, bandemia   - Hypotension improved with albumin, 500 cc bolus with LR   - Pt continues to have SBP 100s  - Blood cultures ordered, NGTD  - Elevated creatinine likely due to relative hypotension due to sepsis  - Patient is tolerating PO, GCS 14/15. Confusion worsening  - Ann-anal abscess identified on exam with purulent drainage; appreciate General Surgery consult and interventions   - Continue Zosyn  - 7/17 Repeat CT Pelvis: nectrotizing infection in the right gluteal region extending to the thigh and groin region  - Worsening leukocytosis  - Emergently taken to the OR for washout and debridement of the necrotizing faciitis  - Upgrade to critical care post-operatively  - Goals of care discussed with daughter/ POA Sandra - Patient will be level 3 DNR/DNI for remainder of admission. We discussed that this infection is life threatening and she expressed understanding of this critical finding and recommended treatment with the associated risks.

## 2023-07-17 NOTE — ASSESSMENT & PLAN NOTE
- Cholelithiasis with mild wall thickening in the fundus on initial imaging  - Nontender and nondistended abdomen, tolerating a diet  - Negative Altamirano's sign  - RUQ US completed due to leukocytosis without evidence of acute cholecystitis  - Repeat CT abd/pelvis completed as well with re-identification of gallstones  - Patient reports she feels hot and sweaty, but afebrile and has some epigastric tenderness. - 7/15 Due to severe elevation of WBC, initiated Rocephin and Flagyl to treat possible acute cholecystitis. Appreciate General Surgery consult and recommendations.  This therapy was discontinued due to identification of a perianal abscess which is more likely to be causing sepsis  - 7/16 HIDA scan negative  - Low suspicion for acute cholecystitis  - Continue to monitor

## 2023-07-17 NOTE — OP NOTE
OPERATIVE REPORT  PATIENT NAME: Edouard Preston    :  3/12/1928  MRN: [id number]  Pt Location: [facility] OR ROOM 03    SURGERY DATE: 2023    Surgeon(s) and Role:     * Jovan Haas MD - Primary     * Keith Benavides DO - Assisting     * Sai Moore MD - Assisting    Preop Diagnosis:  Necrotizing fasciitis ([address]) [M72.6]    Post-Op Diagnosis Codes:     * Necrotizing fasciitis ([address]) [M72.6]    Procedure(s):  DEBRIDEMENT WOUND ([address] OUT)    Specimen(s):  ID Type Source Tests Collected by Time Destination   A : perineal wound Tissue Perineum ANAEROBIC CULTURE AND GRAM STAIN, FUNGAL CULTURE, CULTURE, TISSUE AND GRAM STAIN, AFB CULTURE WITH STAIN Jovan Haas MD 2023 1259        Estimated Blood Loss:   Minimal    Drains:  Urethral Catheter Latex 16 Fr. (Active)   Reasons to continue Urinary Catheter  Acute urinary retention/obstruction failing urinary retention protocol 23 1600   Goal for Removal Remove after 48 hrs of I/O monitoring 23 1600   Site Assessment Clean;Skin intact 23 1600   Qureshi Care Done 23 0846   Collection Container Standard drainage bag 23 1600   Securement Method Securing device (Describe) 23 1600   Output (mL) 75 mL 23 1600   Number of days: 2       [REMOVED] External Urinary Catheter (Removed)   Collection Container Canister and suction tubing (For Female) 23 1441   Suction Pressure (mmHg) 100 mmHg 23 1508   Interventions Pericare performed;Device changed 23 1508   Output (mL) 350 mL 23 0615   Number of days: 4       Anesthesia Type:   General    Operative Indications:  Necrotizing fasciitis ([address]) [M72.6]      Operative Findings:  -NSTI of the right perianal area  -Involvement of the right external sphincter muscle tracking up to the pubic bone.   Final wound dimensions 15 cm x 4 cm x 14 cm  -Wound packed with 2 Betadine soaked Kerlix    Complications:   None    Procedure and Technique:  Patient was met and identified in the preoperative holding area by name and patient identification bracelet. She was brought to the operating room and placed in the supine position. General endotracheal anesthesia was induced. Preoperative antibiotics were administered. The patient was then placed in the lithotomy position, and the area was prepped and draped in usual sterile fashion. A timeout was called and all parties were in agreement. We began by making a cruciate incision along the area of fluctuance in the right perianal area. This was deepened through the subcutaneous tissue where we were immediately met with murky dishwater foul-smelling fluid. The incision was then extended both superiorly and inferiorly. Necrotic tissue was removed with a combination of scissors and electrocautery. The wound tracked all the way up to the pubic bone. We removed both subcutaneous tissue, muscle including the external sphincter, and the skin. We debrided the tissue back to healthy viable bleeding tissue. The area was copiously irrigated with 2 L of sterile warm normal saline using the Pulsavac. Hemostasis was achieved with electrocautery. The final dimensions of the wound were 15 cm x 4 cm x 14 cm. The wound was then packed with 2 Betadine soaked Kerlix and covered with a trauma ABD and mesh panties. Instrument, needle, and sponge counts were correct and confirmed by RF wanding. Patient was brought back to ICU intubated in critical but stable condition. Dr. Coleen Jackson was present for the entire procedure.        Patient Disposition:  Critical Care Unit        SIGNATURE: Geovanny Starkey DO  DATE: July 17, 2023  TIME: 4:44 PM

## 2023-07-17 NOTE — ASSESSMENT & PLAN NOTE
- Patient with acute hyponatremia, present on presentation.  - Patient not currently on diuretic therapy, did not receive DDAVP, and appears hypervolemic with bilateral pleural effusions  - Urine studies and serum osmol reviewed.   - Appreciate Nephrology consult and recommendations   - Fluid restriction of 1500 mL   - 7/15 discontinued salt tabs, gave 1 dose of Tolvaptan   - Improved  - Continue to monitor sodium level with daily BMP

## 2023-07-17 NOTE — PROGRESS NOTES
General Surgery  Progress Note   Salena Rosado 80 y.o. female MRN: [id number]  Unit/Bed#: [facility] 038-23 Encounter: [id number]    Assessment:  Quincy Dolan is a 80year old female presenting after experiencing a fall. Surgery was consulted regarding potential acute cholecystitis. Perianal abscess draining    AVSS  WBC pending from 46  Hb pending from 9.2  Cr pending from 1.82     Plan:  -Continue IV Zosyn  -Continue diet as tolerated  -wound check today   -F/u urine culture results  -Low suspicion for acute cholecystitis   -Pain/ nausea control PRN  -Incentive Spirometry  -Repeat CT today to evaluate perianal abscess      Subjective/Objective     Subjective:   Patient examined at bedside. She expressed no acute concerns for the team today. She denies any abdominal pain and is tolerating her diet. Her last bowel movement was yesterday and she is urinating with a Qureshi catheter. She reports no perianal pain. She denies nausea vomiting fevers chills and shortness of breath at this time. Objective:   Vitals:Blood pressure 94/54, pulse 84, temperature 98.1 °F (36.7 °C), temperature source Oral, resp. rate 18, height 5' (1.524 m), weight 54.4 kg (120 lb), SpO2 95 %. Temp (24hrs), Av.9 °F (36.6 °C), Min:97.6 °F (36.4 °C), Max:98.1 °F (36.7 °C)      I/O        0701  07/15 0700 07/15 0701   0700    P. O. 880 1060    IV Piggyback 600 1050    Total Intake(mL/kg) 1480 (27.2) 2110 (38.8)    Urine (mL/kg/hr) 1675 (1.3) 400 (0.3)    Stool 0 0    Total Output 1675 400    Net -195 +1710          Unmeasured Stool Occurrence 1 x 1 x          Invasive Devices     Peripheral Intravenous Line  Duration           Peripheral IV 23 Dorsal (posterior); Right Forearm 3 days    Peripheral IV 07/15/23 Left;Ventral (anterior) Forearm 1 day          Drain  Duration           Urethral Catheter Latex 16 Fr. 1 day              Physical Exam:   General: No acute distress  Neuro: Awake, Alert and Oriented x 3  HEENT: Normocephalic  CV: Regular rate and rhythm  Lungs: Normal work of breathing, no increased respiratory effort  Abdomen: Soft, NT/ND  Buttocks: Draining perianal abscess    Extremities: No lower extremity edema  Skin: Warm    Lab Results:   BMP/CMP:   No results found for: "SODIUM", "K", "CL", "CO2", "ANIONGAP", "BUN", "CREATININE", "GLUCOSE", "CALCIUM", "AST", "ALT", "ALKPHOS", "PROT", "BILITOT", "EGFR" and CBC:   No results found for: "WBC", "HGB", "HCT", "MCV", "PLT", "ADJUSTEDWBC", "RBC", "MCH", "MCHC", "RDW", "MPV", "NRBC"  VTE Prophylaxis: Heparin    Marcelo Salazar MD  7/17/2023  6:22 AM

## 2023-07-17 NOTE — PLAN OF CARE
Problem: Potential for Falls  Goal: Patient will remain free of falls  Description: INTERVENTIONS:  - Educate patient/family on patient safety including physical limitations  - Instruct patient to call for assistance with activity   - Consult OT/PT to assist with strengthening/mobility   - Keep Call bell within reach  - Keep bed low and locked with side rails adjusted as appropriate  - Keep care items and personal belongings within reach  - Initiate and maintain comfort rounds  - Make Fall Risk Sign visible to staff  - Offer Toileting every 1 Hours, in advance of need  - Initiate/Maintain alarm  - Obtain necessary fall risk management equipment  - Apply yellow socks and bracelet for high fall risk patients  - Consider moving patient to room near nurses station  Outcome: Progressing     Problem: MOBILITY - ADULT  Goal: Maintain or return to baseline ADL function  Description: INTERVENTIONS:  -  Assess patient's ability to carry out ADLs; assess patient's baseline for ADL function and identify physical deficits which impact ability to perform ADLs (bathing, care of mouth/teeth, toileting, grooming, dressing, etc.)  - Assess/evaluate cause of self-care deficits   - Assess range of motion  - Assess patient's mobility; develop plan if impaired  - Assess patient's need for assistive devices and provide as appropriate  - Encourage maximum independence but intervene and supervise when necessary  - Involve family in performance of ADLs  - Assess for home care needs following discharge   - Consider OT consult to assist with ADL evaluation and planning for discharge  - Provide patient education as appropriate  Outcome: Progressing  Goal: Maintains/Returns to pre admission functional level  Description: INTERVENTIONS:  - Perform BMAT or MOVE assessment daily.   - Set and communicate daily mobility goal to care team and patient/family/caregiver.    - Collaborate with rehabilitation services on mobility goals if consulted  - Perform Range of Motion 3 times a day. - Reposition patient every 2 hours.   - Dangle patient 3 times a day  - Stand patient 3 times a day  - Ambulate patient 3 times a day  - Out of bed to chair 3 times a day   - Out of bed for meals 3 times a day  - Out of bed for toileting  - Record patient progress and toleration of activity level   Outcome: Progressing     Problem: PAIN - ADULT  Goal: Verbalizes/displays adequate comfort level or baseline comfort level  Description: Interventions:  - Encourage patient to monitor pain and request assistance  - Assess pain using appropriate pain scale  - Administer analgesics based on type and severity of pain and evaluate response  - Implement non-pharmacological measures as appropriate and evaluate response  - Consider cultural and social influences on pain and pain management  - Notify physician/advanced practitioner if interventions unsuccessful or patient reports new pain  Outcome: Progressing     Problem: INFECTION - ADULT  Goal: Absence or prevention of progression during hospitalization  Description: INTERVENTIONS:  - Assess and monitor for signs and symptoms of infection  - Monitor lab/diagnostic results  - Monitor all insertion sites, i.e. indwelling lines, tubes, and drains  - Monitor endotracheal if appropriate and nasal secretions for changes in amount and color  - Harrah appropriate cooling/warming therapies per order  - Administer medications as ordered  - Instruct and encourage patient and family to use good hand hygiene technique  - Identify and instruct in appropriate isolation precautions for identified infection/condition  Outcome: Progressing  Goal: Absence of fever/infection during neutropenic period  Description: INTERVENTIONS:  - Monitor WBC    Outcome: Progressing     Problem: DISCHARGE PLANNING  Goal: Discharge to home or other facility with appropriate resources  Description: INTERVENTIONS:  - Identify barriers to discharge w/patient and caregiver  - Arrange for needed discharge resources and transportation as appropriate  - Identify discharge learning needs (meds, wound care, etc.)  - Arrange for interpretive services to assist at discharge as needed  - Refer to Case Management Department for coordinating discharge planning if the patient needs post-hospital services based on physician/advanced practitioner order or complex needs related to functional status, cognitive ability, or social support system  Outcome: Progressing     Problem: Knowledge Deficit  Goal: Patient/family/caregiver demonstrates understanding of disease process, treatment plan, medications, and discharge instructions  Description: Complete learning assessment and assess knowledge base. Interventions:  - Provide teaching at level of understanding  - Provide teaching via preferred learning methods  Outcome: Progressing     Problem: Prexisting or High Potential for Compromised Skin Integrity  Goal: Skin integrity is maintained or improved  Description: INTERVENTIONS:  - Identify patients at risk for skin breakdown  - Assess and monitor skin integrity  - Assess and monitor nutrition and hydration status  - Monitor labs   - Assess for incontinence   - Turn and reposition patient  - Assist with mobility/ambulation  - Relieve pressure over bony prominences  - Avoid friction and shearing  - Provide appropriate hygiene as needed including keeping skin clean and dry  - Evaluate need for skin moisturizer/barrier cream  - Collaborate with interdisciplinary team   - Patient/family teaching  - Consider wound care consult   Outcome: Progressing     Problem: Nutrition/Hydration-ADULT  Goal: Nutrient/Hydration intake appropriate for improving, restoring or maintaining nutritional needs  Description: Monitor and assess patient's nutrition/hydration status for malnutrition. Collaborate with interdisciplinary team and initiate plan and interventions as ordered.   Monitor patient's weight and dietary intake as ordered or per policy. Utilize nutrition screening tool and intervene as necessary. Determine patient's food preferences and provide high-protein, high-caloric foods as appropriate.      INTERVENTIONS:  - Monitor oral intake, urinary output, labs, and treatment plans  - Assess nutrition and hydration status and recommend course of action  - Evaluate amount of meals eaten  - Assist patient with eating if necessary   - Allow adequate time for meals  - Recommend/ encourage appropriate diets, oral nutritional supplements, and vitamin/mineral supplements  - Order, calculate, and assess calorie counts as needed  - Recommend, monitor, and adjust tube feedings and TPN/PPN based on assessed needs  - Assess need for intravenous fluids  - Provide specific nutrition/hydration education as appropriate  - Include patient/family/caregiver in decisions related to nutrition  Outcome: Progressing

## 2023-07-17 NOTE — PHYSICAL THERAPY NOTE
PT Treatment Note    NAME:  Marcelo Smalls  DATE: 07/17/23    AGE:   89 y.o. Mrn:   [id number]  ADMIT DX:  UTI (urinary tract infection) [N39.0]  Fatigue [R53.83]  Sacral fracture ([address]) [S32.10XA]  Fall, initial encounter [W19. XXXA]  Unspecified multiple injuries, initial encounter [T07. XXXA]  Performed at least 2 patient identifiers during session: Birthday and ID bracelet         07/17/23 0930   PT Last Visit   PT Visit Date 07/17/23   Note Type   Note Type Treatment   Pain Assessment   Pain Assessment Tool 0-10   Pain Score 4   Pain Location/Orientation Location: Buttocks   Pain Onset/Description Descriptor: Discomfort   Hospital Pain Intervention(s) Repositioned   Restrictions/Precautions   Weight Bearing Precautions Per Order Yes   RLE Weight Bearing Per Order WBAT   LLE Weight Bearing Per Order WBAT   Other Precautions Fall Risk;Fluid restriction;Multiple lines;Pain  (Qureshi cath)   General   Chart Reviewed Yes   Response to Previous Treatment Patient with no complaints from previous session. Family/Caregiver Present No   Cognition   Overall Cognitive Status Impaired   Arousal/Participation Alert   Attention Attends with cues to redirect  (slow response time with cues)   Orientation Level Oriented X4   Memory Decreased short term memory   Following Commands Follows one step commands with increased time or repetition   Comments decreased problem solving noted   Bed Mobility   Supine to Sit 3  Moderate assistance   Additional items Assist x 1;LE management;Verbal cues; Increased time required; Bedrails;HOB elevated   Sit to Supine 3  Moderate assistance   Additional items Assist x 1;LE management;Verbal cues; Increased time required; Bedrails;HOB elevated   Additional Comments pt required trunk support while sitting EOB due to L lean   Transfers   Sit to Stand 3  Moderate assistance   Additional items Assist x 1;Verbal cues; Increased time required;Armrests   Stand to Sit 4  Minimal assistance   Additional items Assist x 1;Verbal cues; Increased time required;Armrests   Stand pivot 4  Minimal assistance   Additional items Assist x 1;Verbal cues; Increased time required   Additional Comments required cues with transfers for proper hand placement; used RW   Ambulation/Elevation   Gait pattern Improper Weight shift; Antalgic; Forward Flexion;Decreased foot clearance;Shuffling; Inconsistent ramón; Foward flexed; Short stride; Excessively slow;Decreased toe off;Decreased heel strike   Gait Assistance 4  Minimal assist   Additional items Assist x 1;Verbal cues   Assistive Device Rolling walker   Distance 3 ft x 3   Ambulation/Elevation Additional Comments lack of stride length or height noted; pt incontinent of stool and required Max A for pericare care and clothing mgt   Balance   Static Sitting Fair -   Dynamic Sitting Poor +   Static Standing Poor +   Dynamic Standing Poor +   Ambulatory Poor +   Endurance Deficit   Endurance Deficit Yes   Endurance Deficit Description required lengthy rest breaks; SaO2 remained at 100% throughout session   Activity Tolerance   Activity Tolerance Patient limited by fatigue;Patient limited by pain   Nurse Made Aware FELIX Posey   Assessment   Prognosis Fair   Assessment Pt seen for PT treatment session this date with interventions consisting of gait training to normalize gait pattern to decrease fall risk and therapeutic activity to improve transfers and increase activity tolerance with functional mobility to decrease fall risk. Pt agreeable to PT treatment session upon arrival, pt found supine in bed, in no apparent distress. Since previous session, pt has made no progress as evidenced by requiring increased amount of assistance with mobility  Barriers during this session include pain and fatigue. Pt continues to be functioning below baseline level, and remains limited 2* factors listed above and including decreased strength, impaired activity tolerance, decreased balance and pain.   Pt prognosis for achieving goals is fair, pending pt progress with hospitalization/medical status improvements, and indicated by ability to follow cues, supportive family and continued required assistance. PT will continue to see pt during current hospitalization in order to address the deficits listed above and provide interventions consistent w/ POC in effort to achieve goals. Current goals and POC remain appropriate, pt continues to have rehab potential  Continue to recommend post acute rehabilitation services at time of d/c in order to maximize pt's functional independence and safety w/ mobility. Upon conclusion pt supine in bed. The patient's AM-PAC Basic Mobility Inpatient Short Form Raw Score is 12. A Raw score of less than or equal to 16 suggests the patient may benefit from discharge to home. Please also refer to the recommendation of the Physical Therapist for safe discharge planning. RN verbalized pt appropriate for session.    Goals   Patient Goals to talk to her children   PT Treatment Day 3   Plan   Progress Slow progress, decreased activity tolerance   Recommendation   PT Discharge Recommendation Post acute rehabilitation services   [facility]  (pt has a RW)   AM-PAC Basic Mobility Inpatient   Turning in Flat Bed Without Bedrails 3   Lying on Back to Sitting on Edge of Flat Bed Without Bedrails 2   Moving Bed to Chair 2   Standing Up From Chair Using Arms 2   Walk in Room 2   Climb 3-5 Stairs With Railing 1   Basic Mobility Inpatient Raw Score 12   Basic Mobility Standardized Score 32.23   Highest Level Of Mobility   JH-HLM Goal 4: Move to chair/commode   JH-HLM Achieved 4: Move to chair/commode     Time In: 0849  Time Out: 0930  Total Treatment Minutes: 2001 Dhaval De Los Santos, PT

## 2023-07-17 NOTE — PROGRESS NOTES
[address] y.o. female MRN: [id number]  Unit/Bed#: [facility] 607-01 Encounter: [id number]  Reason for Consult: MAXX    ASSESSMENT and PLAN:  70-year-old female who presents after fall. Neurology consulted for management of MAXX and hyponatremia    Acute kidney injury  · Baseline creatinine appears to be near 1.1  · Creatinine at baseline on admission 7/10  · 7/14 creatinine increasing with a peak creatinine of 1.8 on 7/16. Diuretic on hold  · 7/17 creatinine down to 1.68. Appears euvolemic/dry  · Etiology/risk factors:  · Contrast on 7/14, hypotension, sepsis in the setting of solitary kidney, advanced age  · Imaging: Left nephrectomy. Right kidney within normal limits  · Urinalysis: Moderate leuks, trace protein, 10-20 RBCs, 10-20 WBCs. Urine culture negative  · Qureshi catheter in place since 7/15. Urine output near 1 L  · Renal function improving. No changes at this time. · Appears dry. Hold diuretic today consider resuming in 24 to 48 hours  · Continue midodrine    Hypoosmolar hyponatremia:  · Work-up:   · Urine sodium 16, urine osmolality 258, serum osmolality 265  · A.m. cortisol 27.7  · Uric acid normal 5.7  · TSH normal  · Sodium level 129 on admission  · Sodium up to 131, improved  · Salt tablets on hold since 7/15. Given 1 dose of Samsca on 7/15  · Loop diuretic on hold due to increasing creatinine  · On modest fluid restriction 1500 mL day  · Solute intake appears to be acceptable  · No changes at this time. Continue to hold salt tablets and loop diuretic    Hypotension:  · Added on midodrine 7/16  · Blood pressure in the 90s, stable    Sepsis:  · Source unclear  · Increasing leukocytosis  · Urine culture negative  · Perianal abscess  · On Zosyn    Status post fall: Rib fracture and sacral fracture    Anemia:  · Hemoglobin stable, 9.0    Acid-base disorder:  · Low bicarbonate improving, up to 20.     Hypophosphatemia: Given replacement with improvement to 2.6    Cholelithiasis without evidence of acute cholecystitis: On empiric antibiotic treatment    DISPOSITION:  Continue to hold diuretic and salt tablets  Modest fluid restriction  Continue midodrine    SUBJECTIVE / 24H INTERVAL HISTORY:  Denies pain. Had breakfast earlier this morning. OBJECTIVE:  Current Weight: Weight - Scale: 54.4 kg (120 lb)  Vitals:    07/16/23 1932 07/16/23 2220 07/17/23 0300 07/17/23 0632   BP: 92/56 93/53 94/54 92/53   BP Location:  Left arm Left arm    Pulse: 89 85 84 91   Resp: 16 18     Temp: 97.6 °F (36.4 °C) 97.9 °F (36.6 °C) 98.1 °F (36.7 °C) 98 °F (36.7 °C)   TempSrc:  Oral Oral    SpO2: 95% 97% 95% 99%   Weight:       Height:           Intake/Output Summary (Last 24 hours) at 7/17/2023 0830  Last data filed at 7/17/2023 0300  Gross per 24 hour   Intake 520 ml   Output 950 ml   Net -430 ml     General: NAD  Skin: no rash  Eyes: anicteric sclera  ENT: Oropharynx appears dry  Neck: supple, no JVD  Chest: CTA b/l, no ronchii, no wheeze, no rubs, no rales. Normal effort  CVS: P0V3, harsh systolic murmur, no gallop, no rub. Normal rate regular rhythm  Abdomen: soft, nontender, nl sounds. Protuberant but nondistended  Extremities: no edema LE b/l.   No mottling or cyanosis  : Qureshi catheter in place  Neuro: AAOX3  Psych: normal affect  Medications:    Current Facility-Administered Medications:   •  acetaminophen (TYLENOL) tablet 650 mg, 650 mg, Oral, Q6H [address], Jeni Robb MD, 650 mg at 07/17/23 0620  •  carbamide peroxide (DEBROX) 6.5 % otic solution 5 drop, 5 drop, Both Ears, BID, Tyler Rogers PA-C, 5 drop at 07/17/23 0803  •  famotidine (PEPCID) tablet 20 mg, 20 mg, Oral, Daily, Maryana Powers MD, 20 mg at 07/17/23 0802  •  heparin (porcine) subcutaneous injection 5,000 Units, 5,000 Units, Subcutaneous, Q8H [address], 5,000 Units at 07/17/23 4999 **AND** [CANCELED] Platelet count, , , Once, DO Kamille  •  HYDROmorphone HCl (DILAUDID) injection 0.2 mg, 0.2 mg, Intravenous, Once PRN, Ramona Ochoa MD  •  midodrine (PROAMATINE) tablet 2.5 mg, 2.5 mg, Oral, TID Cutlerromán Beaver DO, 2.5 mg at 07/17/23 7074  •  oxyCODONE (ROXICODONE) IR tablet 5 mg, 5 mg, Oral, Q6H PRN, Yuliya Topete MD, 5 mg at 07/14/23 0436  •  oxyCODONE (ROXICODONE) split tablet 2.5 mg, 2.5 mg, Oral, Q6H PRN, Yuliya Topete MD, 2.5 mg at 07/17/23 0805  •  piperacillin-tazobactam (ZOSYN) 3.375 g in sodium chloride 0.9 % 100 mL IVPB, 3.375 g, Intravenous, Q12H, Ramona Ochoa MD, Last Rate: 200 mL/hr at 07/17/23 0310, 3.375 g at 07/17/23 0310  •  polyethylene glycol (MIRALAX) packet 17 g, 17 g, Oral, Daily, Rommel Rogers PA-C, 17 g at 07/13/23 0920  •  senna-docusate sodium (SENOKOT S) 8.6-50 mg per tablet 1 tablet, 1 tablet, Oral, HS, Rommel Rogers PA-C, 1 tablet at 07/14/23 2156    Laboratory Results:  Results from last 7 days   Lab Units 07/17/23  0446 07/16/23  0438 07/15/23  0602 07/15/23  0451 07/14/23  2244 07/14/23  1734 07/14/23  1104 07/14/23  0629 07/14/23  0532 07/14/23  0415 07/13/23  1205 07/13/23  0441 07/12/23  1457 07/12/23  0432 07/11/23  0833 07/11/23  0832   WBC Thousand/uL 49.23* 46.09* 44.58*  --   --   --   --   --  25.19*  --   --  20.33*  --  18.19*  --  18.79*   HEMOGLOBIN g/dL 9.0* 9.2* 9.0*  --   --   --   --   --  9.4*  --   --  8.7*  --  9.4*  --  9.7*   HEMATOCRIT % 26.4* 26.7* 26.5*  --   --   --   --   --  27.7*  --   --  26.6*  --  27.8*  --  29.4*   PLATELETS Thousands/uL 322 313 275  --   --   --   --   --  224  --   --  190  --  169  --  148*   POTASSIUM mmol/L 5.0 4.8  --  4.9 4.2 4.3 3.9  --   --  4.1   < > 4.0   < > 4.0   < >  --    CHLORIDE mmol/L 104 104  --  102 100 99 97  --   --  97   < > 97   < > 101   < >  --    CO2 mmol/L 20* 19*  --  18* 19* 19* 19*  --   --  21   < > 18*   < > 20*   < >  --    BUN mg/dL 53* 45*  --  30* 26* 25 23  --   --  20   < > 19   < > 16   < >  --    CREATININE mg/dL 1.68* 1.82*  -- 1.33* 1.33* 1.20 1.23  --   --  1.02   < > 0.93   < > 0.92   < >  --    CALCIUM mg/dL 8.4 8.1*  --  8.6 8.4 8.3 8.1*  --   --  8.1*   < > 8.0*   < > 8.2*   < >  --    MAGNESIUM mg/dL  --   --   --  2.0  --   --   --  1.1*  --   --   --   --   --   --   --   --    PHOSPHORUS mg/dL  --  2.6  --  1.8*  --   --   --  1.5*  --   --   --   --   --   --   --   --     < > = values in this interval not displayed.

## 2023-07-17 NOTE — CASE MANAGEMENT
Case Management Discharge Planning Note    Patient name Edouard Preston  Location ICU 14/ICU 14 MRN [id number]  : 3/12/1928 Date 2023       Current Admission Date: 7/10/2023  Current Admission Diagnosis:Generalized weakness   Patient Active Problem List    Diagnosis Date Noted   • Perianal abscess 2023   • Acute encephalopathy 2023   • MAXX (acute kidney injury) ([address]) 07/15/2023   • Sepsis with acute organ dysfunction ([address]) 2023   • Cholelithiasis 2023   • History of rib fracture 2023   • Bilateral pleural effusion 2023   • Bilateral fracture of pubic rami, sequela 2023   • Fall 2023   • Generalized weakness 2023   • Acute UTI (urinary tract infection) 2023   • Closed nondisplaced zone II fracture of sacrum ([address]) 2023   • Hyponatremia 2023   • Anemia 2023   • H/O left nephrectomy 2023   • Raynaud phenomenon 2023   • Arthritis of carpometacarpal (CMC) joint of right thumb 2022   • Primary osteoarthritis of right wrist 2022   • Closed fracture of right proximal humerus 11/10/2022   • Primary hypertension 2014      LOS (days): 6  Geometric Mean LOS (GMLOS) (days): 4.40  Days to GMLOS:-1.6     OBJECTIVE:  Risk of Unplanned Readmission Score: 13.88         Current admission status: Inpatient   Preferred Pharmacy:   [facility] [address] [facility], [address] [place] [address]  Phone: [phone] Fax: [phone]    Primary Care Provider: Fidelia Farnsworth MD    Primary Insurance: [facility]  Secondary Insurance:     DISCHARGE DETAILS:    Noted that pt was upgraded to ICU.    CM will continue to follow for d/c needs and recs

## 2023-07-17 NOTE — CONSULTS
[address] [place] [address]  Consult: Surgical Critical Care  Name: Mortimer Cleveland 80 y.o. female I MRN: [id number]  Unit/Bed#: ICU 15 I Date of Admission: 7/10/2023   Date of Service: 7/17/2023 I Hospital Day: 6      Assessment/Plan   Neuro:   · Diagnosis: Sedation/Analgesia  · Plan:   · Propofol, ween as tolerated  · Fentanyl gtt @ 75 + PRNs      CV:   · Diagnosis: Septic shock  · Plan:   · Phenylephrine, ween  · Postop labs, resuscitate as needed  · Judicious use of volume given AS  · Diagnosis: AS, severe with peak velocity 4.2 m/s, DEANDRE 0.9, gradient 38 as of 6/2021  · Plan: Close monitoring of fluid balance      Pulm:  · Diagnosis: ETT / Vent  · Plan: Ween ventilator settings as tolerated  · Diagnosis: Rib fractures anterior    · Plan: Ween ventilator settings as tolerated    GI:   · Diagnosis: Perianal abscess (likely etiology for nec fasc)  · Plan:   · See ID section  · High likelihood of requiring diverting ostomy      :   · Diagnosis: UTI  · Present on admission  · 7/10 Urine culture = E.  Coli, pan-sensitive  · Plan:   · Qureshi in place due to critical illness  · UTI bugs will be covered by NSTI abx  · Diagnosis: MAXX with hyponatremia  · Hx L nephrectomy for malignancy  · Plan:   · Will closely monitor volume status and trend creatinine  · Appreciate nephrology input  · Was on salt tabs, currently held due to NPO status      F/E/N:    F: Isolyte while NPO  E: Replete PRN  N: Will discuss timing of enteral feeding      Heme/Onc:   · Diagnosis: Leukocytosis  · Secondary to NSTI (originally thought 2/2 GB etiology, 7/17 HIDA negative)  · Plan: See ID section      Endo:   · Diagnosis: No concerns, no history of diabetes  · Plan: No indication for insulin at this time      ID:   · Diagnosis: R perianal NSTI 2/2 perianal abscess  · Perianal abscess noticed 7/15, spontaneously drainage  · Noncon pelvic CT 7/17 showed air tracking into R posterior pelvis from perianal area  · 7/17 OR for wide debridement, packed with betadine Kerlix  · Plan:   · F/u 7/14 BCx, NG 48  · Zosyn/Linezolid  · F/u intraop cultures 7/17      MSK/Skin:   · Diagnosis: Sacral and L inferior pubic ramus fracture  · Plan: WBAT per ortho      Disposition: Critical care       History of Present Illness     HPI: Mortimer Cleveland is a 80 y. o. who presented on 7/11 with fall to her buttock from toilet with -LOC, -AC/AP. Found to have S2 and L inferior pelvic rami fracture, non-op.      History obtained from chart review, unobtainable from patient due to ett/sedation    Review of Systems   Historical Information   Past Medical History:  No date: Cancer [facility])      Comment:  Cervical, Kidney, melanoma Per MRI Screening form 2/5/19 7/11/2023: H/O left nephrectomy Past Surgical History:  No date: NEPHRECTOMY; Left   Current Outpatient Medications   Medication Instructions   • losartan (COZAAR) 100 mg, Oral, Daily   • omeprazole (PRILOSEC) 20 mg, Oral, Daily, Take before a meal   • predniSONE 10 mg tablet TAKE 4 TABLETS FOR 2 DAYS, 3 TABLETS FOR 2 DAYS, 2 TABLETS FOR 2 DAYS, 1 TABLET FOR 2 DAYS THEN OFF    No Known Allergies   Social History     Tobacco Use   • Smoking status: Never   • Smokeless tobacco: Never   Substance Use Topics   • Alcohol use: Not Currently   • Drug use: Not Currently    Family History   Problem Relation Age of Onset   • Brain cancer Other           Objective                            Vitals I/O      Most Recent Min/Max in 24hrs   Temp 98 °F (36.7 °C) Temp  Min: 97.6 °F (36.4 °C)  Max: 98.1 °F (36.7 °C)   Pulse 78 Pulse  Min: 78  Max: 94   Resp 17 Resp  Min: 14  Max: 18   /52 BP  Min: 92/53  Max: 121/52   O2 Sat 100 % SpO2  Min: 94 %  Max: 100 %      Intake/Output Summary (Last 24 hours) at 7/17/2023 1652  Last data filed at 7/17/2023 1600  Gross per 24 hour   Intake 1133.84 ml   Output 1380 ml   Net -246.16 ml         Diet NPO     Invasive Monitoring Physical exam   Arterial Line  Evangelina /94  Arterial Line BP  Min: 100/94  Max: 132/58   MAP 98 mmHg  Arterial Line MAP (mmHg)  Min: 88 mmHg  Max: 98 mmHg    Physical Exam  Constitutional:       General: She is not in acute distress. Appearance: She is ill-appearing. Comments: Appears ill but not in extremis   HENT:      Head: Atraumatic. Nose: No rhinorrhea. Mouth/Throat:      Mouth: Mucous membranes are moist.      Comments: ETT in place 22cm @ lip  Cardiovascular:      Rate and Rhythm: Normal rate and regular rhythm. Pulmonary:      Comments: Breathing comfortably on vent  Abdominal:      General: There is no distension. Tenderness: There is no abdominal tenderness. Skin:     Comments: Betadine kerlix in place in R buttock, no bleeding   Neurological:      Comments: Sedated GCS 3T            Diagnostic Studies      EKG:  Will check  Imaging: I have personally reviewed pertinent films in PACS     Medications:  Scheduled PRN   calcium gluconate, 2 g, Once  carbamide peroxide, 5 drop, BID  chlorhexidine, 15 mL, Q12H NELSY  heparin (porcine), 5,000 Units, Q8H [address]  linezolid, 300 mg, Q12H  pantoprazole, 40 mg, Q24H NELSY  piperacillin-tazobactam, 3.375 g, Q12H      fentanyl citrate (PF), 50 mcg, Q1H PRN  fentanyl citrate (PF), 50 mcg, Once PRN       Continuous    fentaNYL, 50 mcg/hr, Last Rate: 50 mcg/hr (07/17/23 1501)  phenylephine,  mcg/min, Last Rate: 100 mcg/min (07/17/23 1547)  propofol, 5-50 mcg/kg/min, Last Rate: 40 mcg/kg/min (07/17/23 1547)         Labs:    CBC    Recent Labs     07/17/23  0446 07/17/23  1453   WBC 49.23* 74.56*   HGB 9.0* 9.5*   HCT 26.4* 28.4*    420*     BMP    Recent Labs     07/17/23  0446 07/17/23  1453   SODIUM 131* 130*   K 5.0 4.9    105   CO2 20* 18*   AGAP 7 7   BUN 53* 51*   CREATININE 1.68* 1.33*   CALCIUM 8.4 8.5       Coags    No recent results     Additional Electrolytes  Recent Labs     07/16/23  0438 07/17/23  1453   MG  --  2.1   PHOS 2.6 3.7   CAIONIZED  --  1.08*          Blood Gas    Recent Labs     07/17/23  1453   PHART 7.374   [id number] 30.8*   PO2ART 139.3*   BCL1JDS 17.6*   BEART -6.7   SOURCE Line, Arterial     Recent Labs     07/17/23  1453   SOURCE Line, Arterial    LFTs  Recent Labs     07/16/23  0438 07/17/23  1453   ALT 24 50   AST 27 80*   ALKPHOS 145* 352*   ALB 1.8* 1.7*   TBILI 0.28 0.41       Infectious  No recent results  Glucose  Recent Labs     07/16/23  0438 07/17/23  0446 07/17/23  1453   GLUC 92 75 128             Critical Care Time Delivered: Upon my evaluation, this patient had a high probability of imminent or life-threatening deterioration due to septic shock, ventilator dependent respiratory failure, which required my direct attention, intervention, and personal management. I have personally provided 30 minutes of critical care time, exclusive of procedures, teaching, family meetings, and any prior time recorded by providers other than myself.      Hardik Rojo MD

## 2023-07-17 NOTE — ASSESSMENT & PLAN NOTE
- 7/15 nursing identified an area of purulent drainage   - Patient was found to have a perianal abscess, purulent drainage expressed  - Appreciate General surgery consult and recommendations  - Bedside washout and debridement, packed  - IV Zosyn initiated  - Monitor fever/ WBC curve  - Leukocytosis continues to rise, at 49,000 today  - CT pelvis completed which indicated necrotizing infection. Patient taken to OR.

## 2023-07-17 NOTE — PLAN OF CARE
Problem: PHYSICAL THERAPY ADULT  Goal: Performs mobility at highest level of function for planned discharge setting. See evaluation for individualized goals. Description: Treatment/Interventions: ADL retraining, Elevations, Functional transfer training, LE strengthening/ROM, Endurance training, Therapeutic exercise, Equipment eval/education, Patient/family training, Bed mobility, Gait training, Compensatory technique education          See flowsheet documentation for full assessment, interventions and recommendations. 7/17/2023 0942 by Darline Salgado PT  Outcome: Progressing  Note: Prognosis: Fair  Problem List: Decreased strength, Decreased range of motion, Decreased endurance, Impaired balance, Decreased mobility  Assessment: Pt seen for PT treatment session this date with interventions consisting of gait training to normalize gait pattern to decrease fall risk and therapeutic activity to improve transfers and increase activity tolerance with functional mobility to decrease fall risk. Pt agreeable to PT treatment session upon arrival, pt found supine in bed, in no apparent distress. Since previous session, pt has made no progress as evidenced by requiring increased amount of assistance with mobility  Barriers during this session include pain and fatigue. Pt continues to be functioning below baseline level, and remains limited 2* factors listed above and including decreased strength, impaired activity tolerance, decreased balance and pain. Pt prognosis for achieving goals is fair, pending pt progress with hospitalization/medical status improvements, and indicated by ability to follow cues, supportive family and continued required assistance. PT will continue to see pt during current hospitalization in order to address the deficits listed above and provide interventions consistent w/ POC in effort to achieve goals.  Current goals and POC remain appropriate, pt continues to have rehab potential  Continue to recommend post acute rehabilitation services at time of d/c in order to maximize pt's functional independence and safety w/ mobility. Upon conclusion pt supine in bed. The patient's AM-PAC Basic Mobility Inpatient Short Form Raw Score is 12. A Raw score of less than or equal to 16 suggests the patient may benefit from discharge to home. Please also refer to the recommendation of the Physical Therapist for safe discharge planning. RN verbalized pt appropriate for session. Barriers to Discharge: Decreased caregiver support (Pt home alione)     PT Discharge Recommendation: Post acute rehabilitation services    See flowsheet documentation for full assessment. 7/17/2023 0942 by Herberth Yanes PT  Outcome: Progressing  Note: Prognosis: Fair  Problem List: Decreased strength, Decreased range of motion, Decreased endurance, Impaired balance, Decreased mobility  Assessment: Pt seen for PT treatment session this date with interventions consisting of gait training to normalize gait pattern to decrease fall risk and therapeutic activity to improve transfers and increase activity tolerance with functional mobility to decrease fall risk. Pt agreeable to PT treatment session upon arrival, pt found supine in bed, in no apparent distress. Since previous session, pt has made no progress as evidenced by requiring increased amount of assistance with mobility  Barriers during this session include pain and fatigue. Pt continues to be functioning below baseline level, and remains limited 2* factors listed above and including decreased strength, impaired activity tolerance, decreased balance and pain. Pt prognosis for achieving goals is fair, pending pt progress with hospitalization/medical status improvements, and indicated by ability to follow cues, supportive family and continued required assistance.  PT will continue to see pt during current hospitalization in order to address the deficits listed above and provide interventions consistent w/ POC in effort to achieve goals. Current goals and POC remain appropriate, pt continues to have rehab potential  Continue to recommend post acute rehabilitation services at time of d/c in order to maximize pt's functional independence and safety w/ mobility. Upon conclusion pt supine in bed. The patient's AM-PAC Basic Mobility Inpatient Short Form Raw Score is 12. A Raw score of less than or equal to 16 suggests the patient may benefit from discharge to home. Please also refer to the recommendation of the Physical Therapist for safe discharge planning. RN verbalized pt appropriate for session. Barriers to Discharge: Decreased caregiver support (Pt home alione)     PT Discharge Recommendation: Post acute rehabilitation services    See flowsheet documentation for full assessment.

## 2023-07-17 NOTE — ANESTHESIA POSTPROCEDURE EVALUATION
Post-Op Assessment Note    CV Status:  Stable    Pain management: adequate     Mental Status:  Somnolent and sleepy (sedated)   Hydration Status:  Euvolemic   PONV Controlled:  Controlled   Airway Patency:  Patent and adequate  Airway: intubated      Post Op Vitals Reviewed: Yes      Staff: Anesthesiologist, CRNA   Reason for prolonged intubation > 24 hours:  Pt remained critically ill in the iCUReason for prolonged intubation > 48 hours:  Pt critically ill in the iCU      No notable events documented. BP   102/60   Temp   37.5C   Pulse  88   Resp   14   SpO2   100   Patient brought to ICU with CRNA in stable condition. HD monitored throughout transfer and patient remained HD stable throughout transport. Patient ventilation and oxygenated with an ambu bag with ETT in place for transport. SpO2 remained at 100% throughout transport. Handoff given to ICU RN and ICU resident/PA.  All questions were answered

## 2023-07-18 ENCOUNTER — ANESTHESIA (INPATIENT)
Dept: PERIOP | Facility: HOSPITAL | Age: 88
DRG: 853 | End: 2023-07-18
Payer: COMMERCIAL

## 2023-07-18 ENCOUNTER — ANESTHESIA EVENT (INPATIENT)
Dept: PERIOP | Facility: HOSPITAL | Age: 88
DRG: 853 | End: 2023-07-18
Payer: COMMERCIAL

## 2023-07-18 PROBLEM — I35.0 NONRHEUMATIC AORTIC VALVE STENOSIS: Status: ACTIVE | Noted: 2017-05-16

## 2023-07-18 PROBLEM — Z99.11 VENTILATOR DEPENDENCE (HCC): Status: ACTIVE | Noted: 2023-01-01

## 2023-07-18 RX ORDER — SODIUM CHLORIDE, SODIUM LACTATE, POTASSIUM CHLORIDE, CALCIUM CHLORIDE 600; 310; 30; 20 MG/100ML; MG/100ML; MG/100ML; MG/100ML
INJECTION, SOLUTION INTRAVENOUS CONTINUOUS PRN
Status: DISCONTINUED | OUTPATIENT
Start: 2023-07-18 | End: 2023-07-18

## 2023-07-18 RX ORDER — ROCURONIUM BROMIDE 10 MG/ML
INJECTION, SOLUTION INTRAVENOUS AS NEEDED
Status: DISCONTINUED | OUTPATIENT
Start: 2023-07-18 | End: 2023-07-18

## 2023-07-18 RX ADMIN — ROCURONIUM BROMIDE 10 MG: 10 INJECTION, SOLUTION INTRAVENOUS at 15:52

## 2023-07-18 RX ADMIN — ROCURONIUM BROMIDE 10 MG: 10 INJECTION, SOLUTION INTRAVENOUS at 16:15

## 2023-07-18 RX ADMIN — SUGAMMADEX 200 MG: 100 INJECTION, SOLUTION INTRAVENOUS at 16:32

## 2023-07-18 RX ADMIN — SODIUM CHLORIDE, SODIUM LACTATE, POTASSIUM CHLORIDE, AND CALCIUM CHLORIDE: .6; .31; .03; .02 INJECTION, SOLUTION INTRAVENOUS at 15:26

## 2023-07-18 RX ADMIN — ROCURONIUM BROMIDE 30 MG: 10 INJECTION, SOLUTION INTRAVENOUS at 15:32

## 2023-07-18 NOTE — CONSULTS
[address] [place] [address]  Consult: Surgical Critical Care  Name: Torsten Dowd 80 y.o. female I MRN: [id number]  Unit/Bed#: ICU 15 I Date of Admission: 7/10/2023   Date of Service: 7/18/2023 I Hospital Day: 7      Assessment/Plan   Neuro:   · Diagnosis: Sedation/Analgesia  · Plan:   · Fentanyl gtt @ 75 + PRN fentanyl 50mcg q1h prn       CV:   · Diagnosis: Shock  · Likely septic shock in the setting of NSTI   · Plan:   · Phenylephrine, wean as able   · Judicious use of volume given AS  · Monitor hemodynamics   · Diagnosis: AS, severe with peak velocity 4.2 m/s, DEANDRE 0.9, gradient 38 as of 6/2021   · Plan:   · Close monitoring of fluid balance  · Follow up TTE ordered   · Diagnosis: HTN   · Plan:  · Holding home losartan due to hypotension       Pulm:  · Diagnosis: ETT / Vent  · Plan:   · Wean ventilator settings as tolerated  · Daily SBT   · Diagnosis: Acute L anterior 2-4 rib fractures; old healed R posterolateral 8-9 rib fractures   · CT chest 7/14: "Acute, nondisplaced left anterior second through fourth rib fractures. Stable T12 compression fracture.  Old healed right posterolateral eighth and ninth rib fractures."  · Plan:  · Encourage IS when extubated       GI:   · Diagnosis: Perianal abscess (likely etiology for nec fasc)  · Plan:   · See ID section  · High likelihood of requiring diverting ostomy  · Diagnosis: Cholelithiasis   · 7/11 CT abdomen pelvis wo contrast: cholelithiasis with mild wall thickening in fundus  · 7/14 CT abdomen pelvis w contrast: similar to prior CT study    · 7/14 RUQ US: cholelithiasis without sonographic findings for acute cholecystitis   · 7/16 HIDA: no evidence for acute cholecystitis   · Rocephin and flagyl initiated 7/15 for possible acute cholecystitis - this therapy was discontinued 7/15 due to identification of a perianal abscess which is more likely to be the etiology of sepsis   · Plan:   · Continue to monitor   · GI PPx: protonix 40mg daily :   · Diagnosis: UTI  · Present on admission  · 7/10 Urine culture = E. Coli, pan-sensitive  · Treated with 3 day course Rocephin (7/11-7/13)   · 7/15 urine cx: no growth <1000 cfu/ml   · Plan:   · Maintain russell in place due to critical illness  · Diagnosis: MAXX with hyponatremia  · Baseline Cr 1.1   · Hx L nephrectomy for malignancy  · Plan:   · Will closely monitor volume status and trend creatinine  · Was on salt tabs, currently held due to NPO status  · Appreciate nephrology input - continue to hold salt tablets and modest fluid restriction 1500 ml day       F/E/N:    F: no mIVF   E: Replete PRN  N: Will discuss timing of enteral feeding      Heme/Onc:   · Diagnosis: Leukocytosis  · Secondary to NSTI (originally thought 2/2 GB etiology, 7/16 HIDA negative)  · Plan: See ID section  · DVT PPx: SQH      Endo:   · Diagnosis: No concerns, no history of diabetes  · Plan: No indication for insulin at this time      ID:   · Diagnosis: R perianal NSTI 2/2 perianal abscess  · Perianal abscess noticed 7/15, spontaneously drainage  · Noncon pelvic CT 7/17 showed air tracking into R posterior pelvis from perianal area  · 7/17 OR for wide debridement, wound packed with betadine Kerlix  · Plan:   · F/u 7/14 BCx, NG 48  · F/u intraop cultures 7/17  · Continue Zosyn (7/15 - ), Linezolid (7/17 - )       MSK/Skin:   · Diagnosis: Sacral and L pubic rami fracture  · Plan: WBAT per ortho. Follow up in 4 weeks   · Diagnosis: DTI PPx   · Plan:   · Frequent repositioning. PT/OT and OOB when able     Disposition: Critical care       History of Present Illness     HPI: Andre Montano is a 80 y. o. who presented on 7/11 with fall to her buttock from toilet with -LOC, -head strike, -AC/AP. Found to have S2 and L pubic rami fracture, non-op. Patient was noted to have leukocytosis WBC 50 yesterday, which prompted repeat CT given setting of perianal abscess.  CT pelvis obtained showed findings concerning for necrotizing fascitis and patient was subsequently taken to the OR for wound debridement. OR findings include NSTI of the right perianal area with involvement of right external sphincter muscle tracking up to the pubic bone. Overnight, patient received 500cc isolyte. Propofol was turned off. Neosynephrine weaned down from 100 to 30. Troponin peaked at 300. Cardiology was consulted for rising troponin and EKG with ST elevation in V1-V2 - low suspicion of type 1 ACS. History obtained from chart review, unobtainable from patient due to ETT.     Review of Systems   Historical Information   Past Medical History:  No date: Cancer [facility])      Comment:  Cervical, Kidney, melanoma Per MRI Screening form 2/5/19 7/11/2023: H/O left nephrectomy Past Surgical History:  No date: NEPHRECTOMY; Left   Current Outpatient Medications   Medication Instructions   • losartan (COZAAR) 100 mg, Oral, Daily   • omeprazole (PRILOSEC) 20 mg, Oral, Daily, Take before a meal   • predniSONE 10 mg tablet TAKE 4 TABLETS FOR 2 DAYS, 3 TABLETS FOR 2 DAYS, 2 TABLETS FOR 2 DAYS, 1 TABLET FOR 2 DAYS THEN OFF    No Known Allergies   Social History     Tobacco Use   • Smoking status: Never   • Smokeless tobacco: Never   Substance Use Topics   • Alcohol use: Not Currently   • Drug use: Not Currently    Family History   Problem Relation Age of Onset   • Brain cancer Other           Objective                            Vitals I/O      Most Recent Min/Max in 24hrs   Temp 98.3 °F (36.8 °C) Temp  Min: 98.3 °F (36.8 °C)  Max: 98.5 °F (36.9 °C)   Pulse 58 Pulse  Min: 54  Max: 94   Resp 14 Resp  Min: 13  Max: 17   /52 BP  Min: 121/52  Max: 121/52   O2 Sat 100 % SpO2  Min: 94 %  Max: 100 %      Intake/Output Summary (Last 24 hours) at 7/18/2023 0658  Last data filed at 7/18/2023 0534  Gross per 24 hour   Intake 1780.02 ml   Output 1555 ml   Net 225.02 ml         Diet NPO     Invasive Monitoring Physical exam   Arterial Line  [place] /38  Arterial Line BP  Min: 100/94  Max: 132/58   MAP (!) 64 mmHg  Arterial Line MAP (mmHg)  Min: 64 mmHg  Max: 98 mmHg    Physical Exam  Constitutional:       General: She is not in acute distress. HENT:      Head: Atraumatic. Nose: No rhinorrhea. Mouth/Throat:      Mouth: Mucous membranes are moist.      Comments: ETT in place 22cm @ lip  Eyes:      Conjunctiva/sclera: Conjunctivae normal.      Pupils: Pupils are equal, round, and reactive to light. Cardiovascular:      Rate and Rhythm: Normal rate and regular rhythm. Pulses: Normal pulses. Pulmonary:      Comments: Breathing comfortably on vent  Abdominal:      General: There is no distension. Palpations: Abdomen is soft. Tenderness: There is no abdominal tenderness. Skin:     General: Skin is warm and dry. Capillary Refill: Capillary refill takes less than 2 seconds. Comments: Betadine kerlix in place in R buttock, no bleeding   Neurological:      General: No focal deficit present. Mental Status: She is alert. Comments: Follows commands             Diagnostic Studies      EKG:  Will check  Imaging: I have personally reviewed pertinent films in PACS     Medications:  Scheduled PRN   carbamide peroxide, 5 drop, BID  chlorhexidine, 15 mL, Q12H NELSY  heparin (porcine), 5,000 Units, Q8H [address]  linezolid, 300 mg, Q12H  pantoprazole, 40 mg, Q24H NELSY  piperacillin-tazobactam, 3.375 g, Q12H      fentanyl citrate (PF), 50 mcg, Q1H PRN       Continuous    fentaNYL, 75 mcg/hr, Last Rate: 75 mcg/hr (07/17/23 2226)  phenylephine,  mcg/min, Last Rate: 40 mcg/min (07/18/23 0626)  propofol, 5-50 mcg/kg/min, Last Rate: Stopped (07/18/23 0102)         Labs:    CBC    Recent Labs     07/18/23  0029 07/18/23  0516   WBC 72.36* 62.59*   HGB 9.8* 9.5*   HCT 29.5* 29.1*   * 407*     BMP    Recent Labs     07/18/23  0029 07/18/23  0516   SODIUM 130* 135   K 5.3 5.6*    106   CO2 18* 19*   AGAP 6 10   BUN 50* 49*   CREATININE 1.29 1.21   CALCIUM 9.0 9.0 Coags    No recent results     Additional Electrolytes  Recent Labs     07/18/23  0029 07/18/23  0516   MG 2.2 2.3   PHOS 4.9* 5.0*   CAIONIZED 1.06* 1.17          Blood Gas    Recent Labs     07/18/23  0030   PHART 7.310*   LCP5DRB 32.7*   PO2ART 134.4*   LVV4NFM 16.1*   BEART -9.2   SOURCE Line, Arterial     Recent Labs     07/18/23  0027 07/18/23  0030   PHVEN 7.247*  --    JUI6IEN 45.9  --    PO2VEN 39.4  --    ATT5FVG 19.5*  --    BEVEN -7.5  --    SOURCE  --  Line, Arterial    LFTs  Recent Labs     07/17/23  1453   ALT 50   AST 80*   ALKPHOS 352*   ALB 1.7*   TBILI 0.41       Infectious  No recent results  Glucose  Recent Labs     07/17/23  0446 07/17/23  1453 07/18/23  0029 07/18/23  0516   GLUC 75 128 156* 140               Havery Schlatter, MD

## 2023-07-18 NOTE — CASE MANAGEMENT
Case Management Discharge Planning Note    Patient name Cindy Morel  Location ICU 14/ICU 14 MRN [id number]  : 3/12/1928 Date 2023       Current Admission Date: 7/10/2023  Current Admission Diagnosis:Generalized weakness   Patient Active Problem List    Diagnosis Date Noted   • Perianal abscess 2023   • Acute encephalopathy 2023   • MAXX (acute kidney injury) ([address]) 07/15/2023   • Sepsis with acute organ dysfunction ([address]) 2023   • Cholelithiasis 2023   • History of rib fracture 2023   • Bilateral pleural effusion 2023   • Bilateral fracture of pubic rami, sequela 2023   • Fall 2023   • Generalized weakness 2023   • Acute UTI (urinary tract infection) 2023   • Closed nondisplaced zone II fracture of sacrum ([address]) 2023   • Hyponatremia 2023   • Anemia 2023   • H/O left nephrectomy 2023   • Raynaud phenomenon 2023   • Arthritis of carpometacarpal (CMC) joint of right thumb 2022   • Primary osteoarthritis of right wrist 2022   • Closed fracture of right proximal humerus 11/10/2022   • Primary hypertension 2014      LOS (days): 7  Geometric Mean LOS (GMLOS) (days): 9.60  Days to GMLOS:2.8     OBJECTIVE:  Risk of Unplanned Readmission Score: 17.34         Current admission status: Inpatient   Preferred Pharmacy:   [facility] [address] [facility],[address], [address] [place] [address]  Phone: [phone] Fax: [phone]    Primary Care Provider: Chata Olvera MD    Primary Insurance: [facility]  Secondary Insurance:     DISCHARGE DETAILS:    Pt remains intubated/sedated.  CM will continue to follow for d/c needs

## 2023-07-18 NOTE — RESPIRATORY THERAPY NOTE
RT Ventilator Management Note  Lynn Hall 80 y.o. female MRN: [id number]  Unit/Bed#: ICU 14 Encounter: [id number]      Daily Screen         7/17/2023  1400             Patient safety screen outcome[de-identified] Failed    Not Ready for Weaning due to[de-identified] Underline problem not resolved              Physical Exam:   Assessment Type: (P) Assess only  Cough: None      Resp Comments: (P) Pt. doing well on CMV. No changes throughout the night.

## 2023-07-18 NOTE — PLAN OF CARE
Problem: Potential for Falls  Goal: Patient will remain free of falls  Description: INTERVENTIONS:  - Educate patient/family on patient safety including physical limitations  - Instruct patient to call for assistance with activity   - Consult OT/PT to assist with strengthening/mobility   - Keep Call bell within reach  - Keep bed low and locked with side rails adjusted as appropriate  - Keep care items and personal belongings within reach  - Initiate and maintain comfort rounds  - Make Fall Risk Sign visible to staff  - Offer Toileting every 1 Hours, in advance of need  - Initiate/Maintain alarm  - Obtain necessary fall risk management equipment  - Apply yellow socks and bracelet for high fall risk patients  - Consider moving patient to room near nurses station  Outcome: Progressing     Problem: MOBILITY - ADULT  Goal: Maintain or return to baseline ADL function  Description: INTERVENTIONS:  -  Assess patient's ability to carry out ADLs; assess patient's baseline for ADL function and identify physical deficits which impact ability to perform ADLs (bathing, care of mouth/teeth, toileting, grooming, dressing, etc.)  - Assess/evaluate cause of self-care deficits   - Assess range of motion  - Assess patient's mobility; develop plan if impaired  - Assess patient's need for assistive devices and provide as appropriate  - Encourage maximum independence but intervene and supervise when necessary  - Involve family in performance of ADLs  - Assess for home care needs following discharge   - Consider OT consult to assist with ADL evaluation and planning for discharge  - Provide patient education as appropriate  Outcome: Progressing  Goal: Maintains/Returns to pre admission functional level  Description: INTERVENTIONS:  - Perform BMAT or MOVE assessment daily.   - Set and communicate daily mobility goal to care team and patient/family/caregiver.    - Collaborate with rehabilitation services on mobility goals if consulted  - Perform Range of Motion 3 times a day. - Reposition patient every 2 hours.   - Dangle patient 3 times a day  - Stand patient 3 times a day  - Ambulate patient 3 times a day  - Out of bed to chair 3 times a day   - Out of bed for meals 3 times a day  - Out of bed for toileting  - Record patient progress and toleration of activity level   Outcome: Progressing     Problem: PAIN - ADULT  Goal: Verbalizes/displays adequate comfort level or baseline comfort level  Description: Interventions:  - Encourage patient to monitor pain and request assistance  - Assess pain using appropriate pain scale  - Administer analgesics based on type and severity of pain and evaluate response  - Implement non-pharmacological measures as appropriate and evaluate response  - Consider cultural and social influences on pain and pain management  - Notify physician/advanced practitioner if interventions unsuccessful or patient reports new pain  Outcome: Progressing     Problem: INFECTION - ADULT  Goal: Absence or prevention of progression during hospitalization  Description: INTERVENTIONS:  - Assess and monitor for signs and symptoms of infection  - Monitor lab/diagnostic results  - Monitor all insertion sites, i.e. indwelling lines, tubes, and drains  - Monitor endotracheal if appropriate and nasal secretions for changes in amount and color  - Ryan appropriate cooling/warming therapies per order  - Administer medications as ordered  - Instruct and encourage patient and family to use good hand hygiene technique  - Identify and instruct in appropriate isolation precautions for identified infection/condition  Outcome: Progressing  Goal: Absence of fever/infection during neutropenic period  Description: INTERVENTIONS:  - Monitor WBC    Outcome: Progressing     Problem: DISCHARGE PLANNING  Goal: Discharge to home or other facility with appropriate resources  Description: INTERVENTIONS:  - Identify barriers to discharge w/patient and caregiver  - Arrange for needed discharge resources and transportation as appropriate  - Identify discharge learning needs (meds, wound care, etc.)  - Arrange for interpretive services to assist at discharge as needed  - Refer to Case Management Department for coordinating discharge planning if the patient needs post-hospital services based on physician/advanced practitioner order or complex needs related to functional status, cognitive ability, or social support system  Outcome: Progressing     Problem: Knowledge Deficit  Goal: Patient/family/caregiver demonstrates understanding of disease process, treatment plan, medications, and discharge instructions  Description: Complete learning assessment and assess knowledge base. Interventions:  - Provide teaching at level of understanding  - Provide teaching via preferred learning methods  Outcome: Progressing     Problem: Prexisting or High Potential for Compromised Skin Integrity  Goal: Skin integrity is maintained or improved  Description: INTERVENTIONS:  - Identify patients at risk for skin breakdown  - Assess and monitor skin integrity  - Assess and monitor nutrition and hydration status  - Monitor labs   - Assess for incontinence   - Turn and reposition patient  - Assist with mobility/ambulation  - Relieve pressure over bony prominences  - Avoid friction and shearing  - Provide appropriate hygiene as needed including keeping skin clean and dry  - Evaluate need for skin moisturizer/barrier cream  - Collaborate with interdisciplinary team   - Patient/family teaching  - Consider wound care consult   Outcome: Progressing     Problem: Nutrition/Hydration-ADULT  Goal: Nutrient/Hydration intake appropriate for improving, restoring or maintaining nutritional needs  Description: Monitor and assess patient's nutrition/hydration status for malnutrition. Collaborate with interdisciplinary team and initiate plan and interventions as ordered.   Monitor patient's weight and dietary intake as ordered or per policy. Utilize nutrition screening tool and intervene as necessary. Determine patient's food preferences and provide high-protein, high-caloric foods as appropriate.      INTERVENTIONS:  - Monitor oral intake, urinary output, labs, and treatment plans  - Assess nutrition and hydration status and recommend course of action  - Evaluate amount of meals eaten  - Assist patient with eating if necessary   - Allow adequate time for meals  - Recommend/ encourage appropriate diets, oral nutritional supplements, and vitamin/mineral supplements  - Order, calculate, and assess calorie counts as needed  - Recommend, monitor, and adjust tube feedings and TPN/PPN based on assessed needs  - Assess need for intravenous fluids  - Provide specific nutrition/hydration education as appropriate  - Include patient/family/caregiver in decisions related to nutrition  Outcome: Progressing     Problem: SAFETY,RESTRAINT: NV/NON-SELF DESTRUCTIVE BEHAVIOR  Goal: Remains free of harm/injury (restraint for non violent/non self-detsructive behavior)  Description: INTERVENTIONS:  - Instruct patient/family regarding restraint use   - Assess and monitor physiologic and psychological status   - Provide interventions and comfort measures to meet assessed patient needs   - Identify and implement measures to help patient regain control  - Assess readiness for release of restraint   Outcome: Progressing  Goal: Returns to optimal restraint-free functioning  Description: INTERVENTIONS:  - Assess the patient's behavior and symptoms that indicate continued need for restraint  - Identify and implement measures to help patient regain control  - Assess readiness for release of restraint   Outcome: Progressing

## 2023-07-18 NOTE — OCCUPATIONAL THERAPY NOTE
OT CANCEL NOTE    Pt chart reviewed. Pt is currently intubated/sedated and not appropriate to engage in skilled OT services at this time. Will continue to follow pt on caseload and see pt when medically stable and as clinically appropriate.       07/18/23 1101   OT Last Visit   OT Visit Date 07/18/23   Note Type   Note Type Cancelled Session   Cancel Reasons Intubated/sedated         Emre Falk MS, OTR/L

## 2023-07-18 NOTE — PROGRESS NOTES
NEPHROLOGY PROGRESS NOTE   Edwin Felix 80 y.o. female MRN: [id number]  Unit/Bed#: ICU 14 Encounter: [id number]        ASSESSMENT & PLAN    #1  Acute kidney injury, likely secondary to prerenal azotemia, ATN  • Creatinine is stable around 1.1-1.3  • 1.55 L urine output  • A-line pressures show maps that are mildly low  • pH is acceptable  • History of left nephrectomy for malignancy    #2  Sepsis  • Secondary to a NSTI UTI  • IV Zosyn and linezolid, infectious disease following  • Washout and debridement to operating room today    #3  Low bicarbonate, hyperkalemia  • With hyperkalemia  • Using pH neutral solutions, pH 7.3,  • Monitor if potassium is higher can start a bicarb drip    #4  Acute hypoxic respiratory failure  • Remains intubated in the ICU on critical care service    #5  Hypotension  • On Mirza-Synephrine  • Keep map above 65    #6  Anemia in the setting of chronic illness  • Monitor H&H and transfuse as needed    #7  Status post fall  · Ongoing supportive care for rib fractures and sacral fractures    #8 hypoosmolar hyponatremia  · Sodium level stable at 135    #10 cholelithiasis without evidence of acute cholecystitis  · Continue to monitor    DISCUSSION:    Discussed with surgical critical care service and we are in agreement, currently stable    SUBJECTIVE:    Patient was seen today, remains in the ICU, critically ill  No acute complaints    12 point review of systems was otherwise negative     Medications:    Current Facility-Administered Medications:   •  carbamide peroxide (DEBROX) 6.5 % otic solution 5 drop, 5 drop, Both Ears, BID, Brooke Gordon MD, 5 drop at 07/18/23 4122  •  chlorhexidine (PERIDEX) 0.12 % oral rinse 15 mL, 15 mL, Mouth/Throat, Q12H [address], Brooke Gordon MD, 15 mL at 07/18/23 9994  •  fentaNYL 1000 mcg in sodium chloride 0.9% 100mL infusion, 75 mcg/hr, Intravenous, Continuous, Brooke Gordon MD, Last Rate: 7.5 mL/hr at 07/17/23 2226, 75 mcg/hr at 07/17/23 2226  • fentanyl citrate (PF) 100 MCG/2ML 50 mcg, 50 mcg, Intravenous, Q1H PRN, Vijay Aviles MD, 50 mcg at 07/18/23 1014  •  heparin (porcine) subcutaneous injection 5,000 Units, 5,000 Units, Subcutaneous, Q8H [address], 5,000 Units at 07/18/23 0517 **AND** [CANCELED] Platelet count, , , Once, [employer], DO  •  linezolid (ZYVOX) IVPB 300 mg, 300 mg, Intravenous, Q12H, Vijay Aviles MD, Last Rate: 150 mL/hr at 07/18/23 0317, 300 mg at 07/18/23 0317  •  pantoprazole (PROTONIX) injection 40 mg, 40 mg, Intravenous, Q24H [address], Vijay Aviles MD, 40 mg at 07/18/23 4465  •  phenylephrine (SHRAVAN-SYNEPHRINE) 50 mg (STANDARD CONCENTRATION) in sodium chloride 0.9% 250 mL,  mcg/min, Intravenous, Titrated, Vijay Aviles MD, Last Rate: 15 mL/hr at 07/18/23 1007, 50 mcg/min at 07/18/23 1007  •  piperacillin-tazobactam (ZOSYN) 3.375 g in sodium chloride 0.9 % 100 mL IVPB, 3.375 g, Intravenous, Q12H, Vijay Aviles MD, Last Rate: 200 mL/hr at 07/18/23 0349, 3.375 g at 07/18/23 0349  •  propofol (DIPRIVAN) 1000 mg in 100 mL infusion (premix), 5-50 mcg/kg/min, Intravenous, Titrated, Vijay Aviles MD, Stopped at 07/18/23 0102    OBJECTIVE:    Vitals:    07/18/23 0700 07/18/23 0726 07/18/23 0800 07/18/23 0900   BP:    121/52   BP Location:       Pulse: 56  (!) 54 64   Resp: 14  13 14   Temp:    98.1 °F (36.7 °C)   TempSrc:    Oral   SpO2: 100% 100% 100% 100%   Weight:    54.4 kg (120 lb)   Height:    5' (1.524 m)        Temp:  [98.1 °F (36.7 °C)-98.5 °F (36.9 °C)] 98.1 °F (36.7 °C)  HR:  [54-94] 64  Resp:  [13-17] 14  BP: (121)/(52) 121/52  SpO2:  [100 %] 100 %     Body mass index is 23.44 kg/m². Weight (last 2 days)     Date/Time Weight    07/18/23 0900 54.4 (120)          I/O last 3 completed shifts: In: 6489 [I.V.:1676.7; IV Piggyback:103.3]  Out: 6024 [Urine:2200; Blood:5]    I/O this shift:   In: 44.9 [I.V.:44.9]  Out: 125 [Urine:125]      Physical exam:    General: no acute distress, cooperative  Eyes: conjunctivae pink, anicteric sclerae  ENT: lips and mucous membranes moist, no exudates, normal external ears  Neck: ROM intact, no JVD  Chest: No respiratory distress, no accessory muscle use  CVS: normal rate, non pericardial friction rub  Abdomen: soft, non-tender, non-distended, normoactive bowel sounds  Extremities: no edema of both legs  Skin: no rash  Neuro: awake, alert, oriented, grossly intact  Psych:  Pleasant affect    Invasive Devices:   Urethral Catheter Latex 16 Fr.  (Active)   Reasons to continue Urinary Catheter  Accurate I&O assessment in critically ill patients (48 hr. max) 07/18/23 0800   Goal for Removal Remove after 48 hrs of I/O monitoring 07/18/23 0800   Site Assessment Clean;Skin intact 07/18/23 0800   Qureshi Care Done 07/18/23 0900   Collection Container Standard drainage bag 07/18/23 0800   Securement Method Securing device (Describe) 07/18/23 0800   Output (mL) 125 mL 07/18/23 0800     Lab Results:   Results from last 7 days   Lab Units 07/18/23  0516 07/18/23  0029 07/17/23  1453 07/17/23  0446 07/16/23  0438 07/15/23  0954 07/15/23  0602 07/15/23  0451 07/14/23  2244 07/14/23  1737 07/14/23  1735 07/14/23  1104 07/14/23  0629   WBC Thousand/uL 62.59* 72.36* 74.56* 49.23* 46.09*  --    < >  --   --   --   --   --   --    HEMOGLOBIN g/dL 9.5* 9.8* 9.5* 9.0* 9.2*  --    < >  --   --   --   --   --   --    HEMATOCRIT % 29.1* 29.5* 28.4* 26.4* 26.7*  --    < >  --   --   --   --   --   --    PLATELETS Thousands/uL 407* 415* 420* 322 313  --    < >  --   --   --   --   --   --    POTASSIUM mmol/L 5.6* 5.3 4.9 5.0 4.8  --   --  4.9   < >  --   --    < >  --    CHLORIDE mmol/L 106 106 105 104 104  --   --  102   < >  --   --    < >  --    CO2 mmol/L 19* 18* 18* 20* 19*  --   --  18*   < >  --   --    < >  --    BUN mg/dL 49* 50* 51* 53* 45*  --   --  30*   < >  --   --    < >  --    CREATININE mg/dL 1.21 1.29 1.33* 1.68* 1.82*  --   --  1.33*   < >  --   --    < >  -- CALCIUM mg/dL 9.0 9.0 8.5 8.4 8.1*  --   --  8.6   < >  --   --    < >  --    MAGNESIUM mg/dL 2.3 2.2 2.1  --   --   --   --  2.0  --   --   --   --  1.1*   PHOSPHORUS mg/dL 5.0* 4.9* 3.7  --  2.6  --   --  1.8*  --   --   --   --  1.5*   ALK PHOS U/L 278*  --  352*  --  145*  --   --   --   --   --   --   --   --    ALT U/L 36  --  50  --  24  --   --   --   --   --   --   --   --    AST U/L 49*  --  80*  --  27  --   --   --   --   --   --   --   --    BLOOD CULTURE   --   --   --   --   --   --   --   --   --  No Growth at 72 hrs. No Growth at 72 hrs.  --   --    NITRITE UA   --   --   --   --   --  Negative  --   --   --   --   --   --   --    LEUKOCYTES UA   --   --   --   --   --  Moderate*  --   --   --   --   --   --   --    BLOOD UA   --   --   --   --   --  Small*  --   --   --   --   --   --   --     < > = values in this interval not displayed. Portions of the record may have been created with voice recognition software. Occasional wrong word or "sound a like" substitutions may have occurred due to the inherent limitations of voice recognition software. Read the chart carefully and recognize, using context, where substitutions have occurred. If you have any questions, please contact the dictating provider.

## 2023-07-18 NOTE — PLAN OF CARE
Problem: Potential for Falls  Goal: Patient will remain free of falls  Description: INTERVENTIONS:  - Educate patient/family on patient safety including physical limitations  - Instruct patient to call for assistance with activity   - Consult OT/PT to assist with strengthening/mobility   - Keep Call bell within reach  - Keep bed low and locked with side rails adjusted as appropriate  - Keep care items and personal belongings within reach  - Initiate and maintain comfort rounds  - Make Fall Risk Sign visible to staff  - Offer Toileting every 1 Hours, in advance of need  - Initiate/Maintain alarm  - Obtain necessary fall risk management equipment  - Apply yellow socks and bracelet for high fall risk patients  - Consider moving patient to room near nurses station  Outcome: Progressing     Problem: MOBILITY - ADULT  Goal: Maintain or return to baseline ADL function  Description: INTERVENTIONS:  -  Assess patient's ability to carry out ADLs; assess patient's baseline for ADL function and identify physical deficits which impact ability to perform ADLs (bathing, care of mouth/teeth, toileting, grooming, dressing, etc.)  - Assess/evaluate cause of self-care deficits   - Assess range of motion  - Assess patient's mobility; develop plan if impaired  - Assess patient's need for assistive devices and provide as appropriate  - Encourage maximum independence but intervene and supervise when necessary  - Involve family in performance of ADLs  - Assess for home care needs following discharge   - Consider OT consult to assist with ADL evaluation and planning for discharge  - Provide patient education as appropriate  Outcome: Progressing  Goal: Maintains/Returns to pre admission functional level  Description: INTERVENTIONS:  - Perform BMAT or MOVE assessment daily.   - Set and communicate daily mobility goal to care team and patient/family/caregiver.    - Collaborate with rehabilitation services on mobility goals if consulted  - Perform Range of Motion 3 times a day. - Reposition patient every 2 hours.   - Dangle patient 3 times a day  - Stand patient 3 times a day  - Ambulate patient 3 times a day  - Out of bed to chair 3 times a day   - Out of bed for meals 3 times a day  - Out of bed for toileting  - Record patient progress and toleration of activity level   Outcome: Progressing     Problem: PAIN - ADULT  Goal: Verbalizes/displays adequate comfort level or baseline comfort level  Description: Interventions:  - Encourage patient to monitor pain and request assistance  - Assess pain using appropriate pain scale  - Administer analgesics based on type and severity of pain and evaluate response  - Implement non-pharmacological measures as appropriate and evaluate response  - Consider cultural and social influences on pain and pain management  - Notify physician/advanced practitioner if interventions unsuccessful or patient reports new pain  Outcome: Progressing     Problem: INFECTION - ADULT  Goal: Absence or prevention of progression during hospitalization  Description: INTERVENTIONS:  - Assess and monitor for signs and symptoms of infection  - Monitor lab/diagnostic results  - Monitor all insertion sites, i.e. indwelling lines, tubes, and drains  - Monitor endotracheal if appropriate and nasal secretions for changes in amount and color  - [place] appropriate cooling/warming therapies per order  - Administer medications as ordered  - Instruct and encourage patient and family to use good hand hygiene technique  - Identify and instruct in appropriate isolation precautions for identified infection/condition  Outcome: Progressing  Goal: Absence of fever/infection during neutropenic period  Description: INTERVENTIONS:  - Monitor WBC    Outcome: Progressing     Problem: DISCHARGE PLANNING  Goal: Discharge to home or other facility with appropriate resources  Description: INTERVENTIONS:  - Identify barriers to discharge w/patient and caregiver  - Arrange for needed discharge resources and transportation as appropriate  - Identify discharge learning needs (meds, wound care, etc.)  - Arrange for interpretive services to assist at discharge as needed  - Refer to Case Management Department for coordinating discharge planning if the patient needs post-hospital services based on physician/advanced practitioner order or complex needs related to functional status, cognitive ability, or social support system  Outcome: Progressing     Problem: Knowledge Deficit  Goal: Patient/family/caregiver demonstrates understanding of disease process, treatment plan, medications, and discharge instructions  Description: Complete learning assessment and assess knowledge base. Interventions:  - Provide teaching at level of understanding  - Provide teaching via preferred learning methods  Outcome: Progressing     Problem: Prexisting or High Potential for Compromised Skin Integrity  Goal: Skin integrity is maintained or improved  Description: INTERVENTIONS:  - Identify patients at risk for skin breakdown  - Assess and monitor skin integrity  - Assess and monitor nutrition and hydration status  - Monitor labs   - Assess for incontinence   - Turn and reposition patient  - Assist with mobility/ambulation  - Relieve pressure over bony prominences  - Avoid friction and shearing  - Provide appropriate hygiene as needed including keeping skin clean and dry  - Evaluate need for skin moisturizer/barrier cream  - Collaborate with interdisciplinary team   - Patient/family teaching  - Consider wound care consult   Outcome: Progressing     Problem: Nutrition/Hydration-ADULT  Goal: Nutrient/Hydration intake appropriate for improving, restoring or maintaining nutritional needs  Description: Monitor and assess patient's nutrition/hydration status for malnutrition. Collaborate with interdisciplinary team and initiate plan and interventions as ordered.   Monitor patient's weight and dietary intake as ordered or per policy. Utilize nutrition screening tool and intervene as necessary. Determine patient's food preferences and provide high-protein, high-caloric foods as appropriate.      INTERVENTIONS:  - Monitor oral intake, urinary output, labs, and treatment plans  - Assess nutrition and hydration status and recommend course of action  - Evaluate amount of meals eaten  - Assist patient with eating if necessary   - Allow adequate time for meals  - Recommend/ encourage appropriate diets, oral nutritional supplements, and vitamin/mineral supplements  - Order, calculate, and assess calorie counts as needed  - Recommend, monitor, and adjust tube feedings and TPN/PPN based on assessed needs  - Assess need for intravenous fluids  - Provide specific nutrition/hydration education as appropriate  - Include patient/family/caregiver in decisions related to nutrition  Outcome: Progressing     Problem: SAFETY,RESTRAINT: NV/NON-SELF DESTRUCTIVE BEHAVIOR  Goal: Remains free of harm/injury (restraint for non violent/non self-detsructive behavior)  Description: INTERVENTIONS:  - Instruct patient/family regarding restraint use   - Assess and monitor physiologic and psychological status   - Provide interventions and comfort measures to meet assessed patient needs   - Identify and implement measures to help patient regain control  - Assess readiness for release of restraint   O: Progressing  Goal: Returns to optimal restraint-free functioning  Description: INTERVENTIONS:  - Assess the patient's behavior and symptoms that indicate continued need for restraint  - Identify and implement measures to help patient regain control  - Assess readiness for release of restraint   Outcome: Progressing

## 2023-07-18 NOTE — PROGRESS NOTES
General Surgery  Progress Note   Edwin Felix 80 y.o. female MRN: [id number]  Unit/Bed#: ICU 14 Encounter: [id number]    Assessment:  79yo F with NSTI of the right perineal area involving the right external sphincter    AVSS  WBC 62.59 (72)  Hb 9.5 (9.8)  Cr pending from     Gtt: fent, zayda, prop     Plan:  -Continue IV Zosyn and linezolid, appreciate ID recs  -OR today for washout and debridement  -Continue diet as tolerated  -F/u urine culture results  -Pain/ nausea control PRN  -Incentive Spirometry      Subjective/Objective     Subjective:   Patient examined at bedside. Intubated    Objective:   Vitals:Blood pressure 121/52, pulse 58, temperature 98.3 °F (36.8 °C), temperature source Oral, resp. rate 14, height 5' (1.524 m), weight 54.4 kg (120 lb), SpO2 100 %. Temp (24hrs), Av.3 °F (36.8 °C), Min:98 °F (36.7 °C), Max:98.5 °F (36.9 °C)      I/O        0701  07/15 0700 07/15 0701   0700    P. O. 880 1060    IV Piggyback 600 1050    Total Intake(mL/kg) 1480 (27.2) 2110 (38.8)    Urine (mL/kg/hr) 1675 (1.3) 400 (0.3)    Stool 0 0    Total Output 1675 400    Net -195 +1710          Unmeasured Stool Occurrence 1 x 1 x          Invasive Devices     Central Venous Catheter Line  Duration           CVC Central Lines 23 <1 day          Peripheral Intravenous Line  Duration           Peripheral IV 07/15/23 Left;Ventral (anterior) Forearm 2 days    Peripheral IV 23 Distal;Left;Upper;Ventral (anterior) Arm <1 day          Arterial Line  Duration           Arterial Line 23 Left Radial <1 day          Drain  Duration           Urethral Catheter Latex 16 Fr. 2 days          Airway  Duration           ETT  Cuffed;Oral;Inflated 7 mm <1 day              Physical Exam:  General: No acute distress, ill-appearing  Neuro: sedated  HEENT: moist mucous membranes, ETT in place  CV: Well perfused, regular rate and rhythm  Lungs: Normal work of breathing, no increased respiratory effort  Abdomen: Soft, non-tender, non-distended.    : betadine soaked kerlix in place within debrided cavity in the right gluteal cleft/perineum, surrounding erythema and induration  Extremities: No edema, clubbing or cyanosis  Skin: Warm, dry      Lab Results:   BMP/CMP:   Lab Results   Component Value Date    SODIUM 130 (L) 07/18/2023    K 5.3 07/18/2023     07/18/2023    CO2 18 (L) 07/18/2023    BUN 50 (H) 07/18/2023    CREATININE 1.29 07/18/2023    CALCIUM 9.0 07/18/2023    AST 80 (H) 07/17/2023    ALT 50 07/17/2023    ALKPHOS 352 (H) 07/17/2023    EGFR 35 07/18/2023    and CBC:   Lab Results   Component Value Date    WBC 62.59 (HH) 07/18/2023    HGB 9.5 (L) 07/18/2023    HCT 29.1 (L) 07/18/2023    MCV 89 07/18/2023     (H) 07/18/2023    RBC 3.28 (L) 07/18/2023    MCH 29.0 07/18/2023    MCHC 32.6 07/18/2023    RDW 15.1 07/18/2023    MPV 9.1 07/18/2023     VTE Prophylaxis: Heparin    Regina Herndon MD  7/18/2023  6:07 AM

## 2023-07-18 NOTE — QUICK NOTE
Post Op Check Note - Surgery Resident  Lynn Hlal 80 y.o. female MRN: [id number]  Unit/Bed#: ICU 14 Encounter: [id number]    ASSESSMENT:  Lynn Hall is a 80 y.o. female who is status post debridement wound for necrotizing fasciitis on 7/17. WBC 75 from 49     Vent - CMV 14/350/6/40%  Gtt  Fentanyl -75  Propofol -45  Phenylephrine - 80      Subjective: Patient examined at bedside. Patient intubated and sedated. Physical Exam:  GEN: NAD  CV: RRR  Lung: Intubated, CMV   Ab: Soft, NT/ND  Neuro: A+Ox3  Incisions: Perineal wound dressed    Blood pressure 121/52, pulse 64, temperature 98.5 °F (36.9 °C), temperature source Oral, resp. rate 15, height 5' (1.524 m), weight 54.4 kg (120 lb), SpO2 100 %. ,Body mass index is 23.44 kg/m².       Intake/Output Summary (Last 24 hours) at 7/17/2023 2048  Last data filed at 7/17/2023 1800  Gross per 24 hour   Intake 810.87 ml   Output 1505 ml   Net -694.13 ml       Invasive Devices     Central Venous Catheter Line  Duration           CVC Central Lines 07/17/23 <1 day          Peripheral Intravenous Line  Duration           Peripheral IV 07/15/23 Left;Ventral (anterior) Forearm 2 days    Peripheral IV 07/17/23 Distal;Left;Upper;Ventral (anterior) Arm <1 day          Arterial Line  Duration           Arterial Line 07/17/23 Left Radial <1 day          Drain  Duration           Urethral Catheter Latex 16 Fr. 2 days          Airway  Duration           ETT  Cuffed;Oral;Inflated 7 mm <1 day                VTE Pharmacologic Prophylaxis: Heparin

## 2023-07-18 NOTE — ANESTHESIA POSTPROCEDURE EVALUATION
Post-Op Assessment Note    CV Status:  Stable    Pain management: adequate     Mental Status:  Arousable   Hydration Status:  Euvolemic   PONV Controlled:  Controlled   Airway Patency:  Patent      Post Op Vitals Reviewed: Yes      Staff: CRNA   Comments: patient transported to ICU on cardiac monitor, no events on transport, bedside report to RN        No notable events documented.     BP   130/55   Temp      Pulse  62   Resp   16   SpO2   100%

## 2023-07-18 NOTE — OP NOTE
OPERATIVE REPORT  PATIENT NAME: Lynn Hall    :  3/12/1928  MRN: [id number]  Pt Location: [facility] OR ROOM 08    SURGERY DATE: 2023    Surgeon(s) and Role:     * Javi Hammer MD - Primary     * Ceil Snellen, DO - Assisting    Preop Diagnosis:  Necrotizing soft tissue infection [M79.89]    Post-Op Diagnosis Codes:     * Necrotizing soft tissue infection [M79.89]    Procedure(s):  DEBRIDEMENT WOUND AND DRESSING CHANGE ([address] [place] [address] OUT)    Specimen(s):  * No specimens in log *    Estimated Blood Loss:   Minimal    Drains:  Rectal Tube With balloon (Active)   Number of days: 0       Urethral Catheter Latex 16 Fr. (Active)   Reasons to continue Urinary Catheter  Accurate I&O assessment in critically ill patients (48 hr. max) 23 1200   Goal for Removal Remove after 48 hrs of I/O monitoring 23 1200   Site Assessment Clean;Skin intact 23 1200   Qureshi Care Done 23 0900   Collection Container Standard drainage bag 23 1200   Securement Method Securing device (Describe) 23 1200   Output (mL) 125 mL 23 1400   Number of days: 3       Anesthesia Type:   General    Operative Indications:  Necrotizing soft tissue infection [M79.89]      Operative Findings:  -Additional areas on purulence noted adjacent to the rectum  -Minimal further debridement of necrotic subcutaneous tissue and muscle  -Final dimensions of the wound measured 15cm x 6cm x 15 cm which tracked all the way down to bone.  -Wound packed with 2 betadine soaked kerlix    Complications:   None    Procedure and Technique:  Patient was met and identified in the ICU by name and patient identification bracelet. She was brought to the operating room and placed in the supine position. A time out was called and all parties were in agreement. She was already receiving around the clock antibiotics. The patient was then positioned in lithotomy and was prepped and draped in usual sterile fashion.  We began by removing the 2 previous betadine soaked kerlix. There was still some further necrotic muscle and subcutaneous tissues  noted on lateral aspect of the wound which was sharply debrided with scissors and electrocautery. Additionally there was further purulence noted adjacent to the rectum which was bluntly deloculated and drained. This abscess cavity directly abutted the rectum. The wound was then scrubbed with a betadine scrub brush. We then copiously irrigated the wound with 3L of warm sterile saline from the Pulsivac. Hemostasis was achieved with electrocautery. The final measurements of the wound were [id number]. We then packed the wound with 2 betadine soaked kerlix and covered with 4x4s and an abd pad. Instrument, needle, and sponge counts were correct and confirmed by RF wanding. Patient was brought back to ICU intubated and in stable condition. Dr. Theressa Babinski was present for the entire procedure.          Patient Disposition:  PACU         SIGNATURE: Lexie Irene DO  DATE: July 18, 2023  TIME: 5:37 PM

## 2023-07-18 NOTE — PHYSICAL THERAPY NOTE
Physical Therapy Cancellation Note    PT orders received chart review completed. Pt is currently intubated/sedated and not appropriate to participate in skilled PT at this time.  PT will follow and treat as medically appropriate.     07/18/23 1000   Note Type   Note type Cancelled Session   Cancel Reasons Intubated/sedated       Sebas Alonoz, PT

## 2023-07-18 NOTE — ANESTHESIA PREPROCEDURE EVALUATION
Procedure:  DEBRIDEMENT WOUND AND DRESSING CHANGE ([address] OUT) (Perineum)    Relevant Problems   CARDIO   (+) Nonrheumatic aortic valve stenosis   (+) Primary hypertension      /RENAL   (+) MAXX (acute kidney injury) ([address])      HEMATOLOGY   (+) Anemia      MUSCULOSKELETAL   (+) Arthritis of carpometacarpal (CMC) joint of right thumb   (+) Primary osteoarthritis of right wrist      PULMONARY   (+) Bilateral pleural effusion      Nervous and Auditory   (+) Acute encephalopathy      Other   (+) Perianal abscess   (+) Sepsis with acute organ dysfunction (HCC)   (+) Ventilator dependence ([address])        Physical Exam    Airway  Comment: intubated           Dental       Cardiovascular      Pulmonary      Other Findings        Anesthesia Plan  ASA Score- 4     Anesthesia Type- general with ASA Monitors. Additional Monitors: arterial line. Airway Plan: ETT. Comment: Discussed plan with daughter Diana Sinclair. Daughter states that her and her 3 brothers wish to continue DNR status during the surgery. Should patient require cpr during surgery, it will not be performed. The same status was honored during yesterday's surgery as well. Patient's daughter verbalized understanding regarding death if no cpr and wishes to continue to maintain the DNR status intraop as well. Patient will remain DNR throughout. .       Plan Factors-    Chart reviewed. EKG reviewed. Imaging results reviewed. Existing labs reviewed. Patient summary reviewed. Induction- intravenous. Postoperative Plan- Plan for postoperative opioid use. Informed Consent- Anesthetic plan and risks discussed with daughter. I personally reviewed this patient with the CRNA. Discussed and agreed on the Anesthesia Plan with the CRNA. Lanny Camarena

## 2023-07-18 NOTE — CONSULTS
Consultation - - Cardiology  Bola Miller 80 y.o. female MRN: [id number]  Unit/Bed#: ICU 14 Encounter: [id number]      Inpatient consult to Cardiology  Consult performed by: Mitchell Engel MD  Consult ordered by: Hunter Cerrato MD          History of Present Illness   Physician Requesting Consult: Adrian Mueller,*  Reason for Consult / Principal Problem: elevated troponin    Assessment/Plan   Assessment/Plan:    #septic shock  #NSTI, perianal abscess  Septic shock in the setting of NSTI likely seeded from perianal abscess s/op OR for wound debridement. She is going back to the OR today for washout and further debridement. On antibiotics    #elevated troponin  Non ischemic troponin elevation in the setting of septic shock. Troponin mildly elevated. EKG shows LBBB. Patient denied any chest pain. #severe AS  #severe calcific MS  2021 TTE, LVEF 65%, severe AS with peak velocity 4.2, mean gradient 38, DEANDRE 0.9. Echo this admission shows LVEF 40%, cardiac index 1.9, global hypokinesis, severe AS, moderate to severe MR, severe MS. Patient now has severe AS and severe MS. Severe AS has been present since 6/2021. MS rose score is >8 and likely not anenable to balloon valvuloplasty. Discussed echo findings with Luis Ceballos-- she does not think any interventions for her heart valve is in her mothers best interest at this time. I agree. #HTN  Home losartan held iso shock    #leukemoid reaction  WBC peak 74. Neutrophilic. #respiratory failure:   Intubated in the setting of septic shock. HPI: Bola Miller is a 80y.o. year old female with PMH of severe AS, HTN, hx of left nephrectomy,  who presented on 7/11 with fall onto her buttocks found to have S2 and pubic rami fracture managed non-operatively--- hospital course complicated by septic shock perianal abscess and taken to the OR for wound debridement for NSTI.      EKG shows LBBB    7/18 hs trop 215--> 300-->  204-->191    Historical Information   Past Medical History:   Diagnosis Date   • Cancer [facility])     Cervical, Kidney, melanoma Per MRI Screening form 2/5/19   • H/O left nephrectomy 7/11/2023     Past Surgical History:   Procedure Laterality Date   • NEPHRECTOMY Left      Social History     Substance and Sexual Activity   Alcohol Use Not Currently     Social History     Substance and Sexual Activity   Drug Use Not Currently     Social History     Tobacco Use   Smoking Status Never   Smokeless Tobacco Never     Family History:unable to obtain    Meds/Allergies   Hospital Medications:   Current Facility-Administered Medications   Medication Dose Route Frequency   • carbamide peroxide (DEBROX) 6.5 % otic solution 5 drop  5 drop Both Ears BID   • chlorhexidine (PERIDEX) 0.12 % oral rinse 15 mL  15 mL Mouth/Throat Q12H [address]   • fentaNYL 1000 mcg in sodium chloride 0.9% 100mL infusion  75 mcg/hr Intravenous Continuous   • fentanyl citrate (PF) 100 MCG/2ML 50 mcg  50 mcg Intravenous Q1H PRN   • heparin (porcine) subcutaneous injection 5,000 Units  5,000 Units Subcutaneous Q8H [address]   • linezolid (ZYVOX) IVPB 300 mg  300 mg Intravenous Q12H   • pantoprazole (PROTONIX) injection 40 mg  40 mg Intravenous Q24H [address]   • phenylephrine (SHRAVAN-SYNEPHRINE) 50 mg (STANDARD CONCENTRATION) in sodium chloride 0.9% 250 mL   mcg/min Intravenous Titrated   • piperacillin-tazobactam (ZOSYN) 3.375 g in sodium chloride 0.9 % 100 mL IVPB  3.375 g Intravenous Q12H   • propofol (DIPRIVAN) 1000 mg in 100 mL infusion (premix)  5-50 mcg/kg/min Intravenous Titrated     Home Medications:   Medications Prior to Admission   Medication   • losartan (COZAAR) 100 MG tablet   • omeprazole (PriLOSEC) 20 mg delayed release capsule   • predniSONE 10 mg tablet       No Known Allergies    Objective   Vitals: Blood pressure 121/52, pulse 64, temperature 98.1 °F (36.7 °C), temperature source Oral, resp. rate 14, height 5' (1.524 m), weight 54.4 kg (120 lb), SpO2 100 %.   Orthostatic Blood Pressures    Flowsheet Row Most Recent Value   Blood Pressure 121/52 filed at 07/17/2023 1500   Patient Position - Orthostatic VS Lying filed at 07/17/2023 0300            Intake/Output Summary (Last 24 hours) at 7/18/2023 0917  Last data filed at 7/18/2023 0800  Gross per 24 hour   Intake 1824.87 ml   Output 1680 ml   Net 144.87 ml       Invasive Devices     Central Venous Catheter Line  Duration           CVC Central Lines 07/17/23 <1 day          Peripheral Intravenous Line  Duration           Peripheral IV 07/15/23 Left;Ventral (anterior) Forearm 2 days    Peripheral IV 07/17/23 Distal;Left;Upper;Ventral (anterior) Arm <1 day          Arterial Line  Duration           Arterial Line 07/17/23 Left Radial <1 day          Drain  Duration           Urethral Catheter Latex 16 Fr. 2 days          Airway  Duration           ETT  Cuffed;Oral;Inflated 7 mm <1 day                Review of Systems:  ROS  ROS as noted above, otherwise 12 point review of systems was performed and is negative. Physical Exam:   Physical Exam   Constitutional:       Appearance: intubated and sedated. HENT:      Head: Normocephalic and atraumatic. Neck:      Vascular: No JVD   Cardiovascular:      Rate and Rhythm: RRR, AS murmur  Pulmonary:      Effort: CTA-b  Abdominal:      General: Abdomen is flat. Bowel sounds are normal.      Palpations: Abdomen is soft. Musculoskeletal:     No edema  Skin:     General: Skin is warm. Neurological:      Mental Status: sedated      Lab Results: I have personally reviewed pertinent lab results.     Results from last 7 days   Lab Units 07/18/23  0516 07/18/23  0029 07/17/23  1453   WBC Thousand/uL 62.59* 72.36* 74.56*   HEMOGLOBIN g/dL 9.5* 9.8* 9.5*   HEMATOCRIT % 29.1* 29.5* 28.4*   PLATELETS Thousands/uL 407* 415* 420*     Results from last 7 days   Lab Units 07/18/23  0516 07/18/23  0029 07/17/23  1453   POTASSIUM mmol/L 5.6* 5.3 4.9   CHLORIDE mmol/L 106 106 105   CO2 mmol/L 19* 18* 18*   BUN mg/dL 49* 50* 51*   CREATININE mg/dL 1.21 1.29 1.33*   CALCIUM mg/dL 9.0 9.0 8.5         Results from last 7 days   Lab Units 07/18/23  0516 07/18/23  0029 07/17/23  1453   MAGNESIUM mg/dL 2.3 2.2 2.1

## 2023-07-19 NOTE — PROGRESS NOTES
Progress Note - Cardiology   Julius Bruce 80 y.o. female MRN: [id number]  Unit/Bed#: ICU 14 Encounter: [id number]    Assessment/Plan  Julius Bruce is a 80y.o. year old female with PMH of severe AS, HTN, hx of left nephrectomy,  who presented on 7/11 with fall onto her buttocks found to have S2 and pubic rami fracture managed non-operatively--- hospital course complicated by septic shock perianal abscess and taken to the OR for wound debridement for NSTI. Cardiology consulted for mild troponin elevation and for severe AS and severe MS. 24 hr events:  OR for washout yesterday, extubated today    #elevated troponin  Non ischemic troponin elevation in the setting of septic shock. Troponin mildly elevated. EKG shows LBBB. Patient denied any chest pain.      #severe AS  #severe calcific MS  2021 TTE, LVEF 65%, severe AS with peak velocity 4.2, mean gradient 38, DEANDRE 0.9. Echo this admission shows LVEF 40%, cardiac index 1.9, global hypokinesis, severe AS, moderate to severe MR, severe MS. Patient now has severe AS and severe MS. Severe AS has been present since 6/2021. MS rose score is >8 and likely not anenable to balloon valvuloplasty. Discussed echo findings with Marciano Moore-- she does not think any interventions for her heart valve is in her mothers best interest at this time. Ongoing [facility] discussion with patient after extubation.      #HTN  Home losartan held iso shock     #septic shock  #NSTI, perianal abscess  Septic shock in the setting of NSTI likely seeded from perianal abscess s/p OR for wound debridement. On antibiotics.      #leukemoid reaction  WBC peak 74. Neutrophilic.      #respiratory failure:   Intubated in the setting of septic shock. Subjective/Objective   Patient examined today when still intubated. She did not have any chest pain.      Vitals: /55 (BP Location: Left arm)   Pulse 70   Temp 97.7 °F (36.5 °C) (Oral)   Resp 19   Ht 5' (1.524 m)   Wt 54.4 kg (120 lb) SpO2 100%   BMI 23.44 kg/m²   Vitals:    07/11/23 1253 07/18/23 0900   Weight: 54.4 kg (120 lb) 54.4 kg (120 lb)     Orthostatic Blood Pressures    Flowsheet Row Most Recent Value   Blood Pressure 108/55 filed at 07/19/2023 1400   Patient Position - Orthostatic VS Lying filed at 07/17/2023 0300            Intake/Output Summary (Last 24 hours) at 7/19/2023 1438  Last data filed at 7/19/2023 1400  Gross per 24 hour   Intake 1760.1 ml   Output 1230 ml   Net 530.1 ml       Invasive Devices     Central Venous Catheter Line  Duration           CVC Central Lines 07/17/23 1 day          Peripheral Intravenous Line  Duration           Peripheral IV 07/15/23 Left;Ventral (anterior) Forearm 3 days          Drain  Duration           Urethral Catheter Latex 16 Fr. 4 days    Rectal Tube With balloon <1 day                  Physical Exam:     Constitutional:       Appearance: intubated and sedated. still able to open eyes to command and track. HENT:      Head: Normocephalic and atraumatic. Neck:      Vascular: No JVD   Cardiovascular:      Rate and Rhythm: RRR, AS murmur  Pulmonary:      Effort: CTA-b  Abdominal:      General: Abdomen is flat. Bowel sounds are normal.      Palpations: Abdomen is soft. Musculoskeletal:     No edema  Skin:     General: Skin is warm. Neurological:      Mental Status: sedated    Lab Results: I have personally reviewed pertinent lab results. Imaging: I have personally reviewed pertinent reports.

## 2023-07-19 NOTE — QUICK NOTE
Post Op Check:    Intubated and sedated, requiring pressors. On zayda @ 60. On 6 peep, 40%. Appears comfortable    Temp:  [98.1 °F (36.7 °C)] 98.1 °F (36.7 °C)  HR:  [54-72] 68  Resp:  [12-21] 20  BP: (121)/(52) 121/52  Arterial Line BP: (102-154)/(34-82) 154/82  FiO2 (%):  [40] 40      Physical Exam:  General:Intubated, sedated  CV: Well perfused, regular rate, requiring pressors  Lungs: Requiring ventilatory support. Abdomen: Soft, non-tender, non-distended.    Extremities: No clubbing or cyanosis  Skin: perineal wound, dressing in place      Plan:  Diet Enteral/Parenteral; Tube Feeding No Oral Diet; Jevity 1.2 Navi; Continuous; 45; Prosource Protein Liquid - One Packet Daily; 30; Water; Every 6 hours  Wean vent and SBT as able  Pain and nausea control PRN    Guilherme Hawk MD  General Surgery   07/18/23

## 2023-07-19 NOTE — QUICK NOTE
Went to assess patient after having undergone wound debridement, washout, and dressing change of NSTI of rectum and subq tissue. Patient remains intubated and sedated. Currently on fentanyl at 75 and phenyleprhine at 60. OG tube in place, russell in place, CVC, a-line, and rectal tube in place. Vent settings currently at rate of 14, TV of 350, peep of 6 and fi02 fo 40%. On exam, abdomen is soft and tender to palpation in the RLQ and LLG, nondistended. Dressings appears clean and dry. Continue mechanical ventilation, plan to extubate tomorrow AM. Initiated tube feeds @ 9pm, continue as is. Will continue to monitor I and O's closely.

## 2023-07-19 NOTE — PROGRESS NOTES
Progress Note - General Surgery  Luisa Stone 80 y.o. female MRN: [id number]  Unit/Bed#: ICU 14 Encounter: [id number]      Assessment:  80 y.o. female  with NSTI of the right perineal area involving the right external sphincter. Most recent debridement 7/18. WBC 50 from 60  Mirza @ 50  Vent: 6 PEEP, 40%    Plan:  - Continue Zosyn and Linezolid. ID consulted, appreciate recs  - Plan for bedside dressing change today, following bedside dressing change, plan for possible extubation.  - Will have goals of care conversation with patient when able, ongoing with family  - Continue enteral nutrition        Subjective: No acute events overnight. Afebrile, hemodynamically stable. Remains intubated. Objective:   Temp:  [97.8 °F (36.6 °C)-98.2 °F (36.8 °C)] 98.2 °F (36.8 °C)  HR:  [58-74] 64  Resp:  [12-21] 14  BP: (107-121)/(52-57) 114/56  Arterial Line BP: ()/(34-82) 102/50  FiO2 (%):  [40] 40    Physical Exam:  General: No acute distress, awakens and follows commands  HEENT: ETT  CV: Well perfused, regular rate and rhythm  Lungs: Requiring Ventilatory support  Abdomen: Soft, non-tender, non-distended  Extremities: No edema, clubbing or cyanosis  Skin: perineal wound, dressing in place      I/O       07/17 0701  07/18 0700 07/18 0701  07/19 0700 07/19 0701 07/20 0700    P. O.       I.V. (mL/kg) 1676.7 (30.8) 901.9 (16.6)     IV Piggyback 103.3 250     Cell Saver  500     Total Intake(mL/kg) 1780 (32.7) 1651.9 (30.4)     Urine (mL/kg/hr) 1550 (1.2) 1220 (0.9)     Stool 0 0     Blood 5      Total Output 1555 1220     Net +225 +431.9            Unmeasured Stool Occurrence 0 x            Lab, Imaging and other studies: I have personally reviewed pertinent reports.   , CBC with diff:   Lab Results   Component Value Date    WBC 50.63 (HH) 07/19/2023    HGB 9.1 (L) 07/19/2023    HCT 27.0 (L) 07/19/2023    MCV 88 07/19/2023     07/19/2023    RBC 3.08 (L) 07/19/2023    MCH 29.5 07/19/2023    MCHC 33.7 07/19/2023 RDW 15.2 (H) 07/19/2023    MPV 8.9 07/19/2023   , BMP/CMP:   Lab Results   Component Value Date    SODIUM 137 07/19/2023    K 5.4 (H) 07/19/2023     (H) 07/19/2023    CO2 19 (L) 07/19/2023    BUN 55 (H) 07/19/2023    CREATININE 1.15 07/19/2023    CALCIUM 8.7 07/19/2023    AST 50 (H) 07/19/2023    ALT 38 07/19/2023    ALKPHOS 226 (H) 07/19/2023    EGFR 40 07/19/2023           Ramona Ochoa MD  7/19/2023 8:56 AM

## 2023-07-19 NOTE — PHYSICAL THERAPY NOTE
Physical Therapy Cancellation Note       07/19/23 0840   PT Last Visit   PT Visit Date 07/19/23   Note Type   Note Type Cancelled Session   Cancel Reasons Intubated/sedated     ORDERS FOR PT EVALUATION RECEIVED AND CHART REVIEW COMPLETED. PT NOT APPROPRIATE FOR PARTICIPATION IN PT AT THIS TIME 2* MEDICAL STATUS. WILL CONTINUE TO FOLLOW AND COMPLETE PT EVALUATION AS MEDICAL STATUS DICTATES.    Julio C Kang, PT

## 2023-07-19 NOTE — PROGRESS NOTES
NEPHROLOGY PROGRESS NOTE   Ira Phillips 80 y.o. female MRN: [id number]  Unit/Bed#: ICU 14 Encounter: [id number]        SUBJECTIVE:    The patient was seen on morning rounds  Patient's daughter at her bedside  Awake but remains on the ventilator nodding her head to questioning    12 point review of systems was not completed secondary to patient being intubated    Medications:    Current Facility-Administered Medications:   •  carbamide peroxide (DEBROX) 6.5 % otic solution 5 drop, 5 drop, Both Ears, BID, Hardik Rojo MD, 5 drop at 07/19/23 1008  •  chlorhexidine (PERIDEX) 0.12 % oral rinse 15 mL, 15 mL, Mouth/Throat, Q12H [address], Hardik Rojo MD, 15 mL at 07/19/23 1007  •  fentaNYL 1000 mcg in sodium chloride 0.9% 100mL infusion, 75 mcg/hr, Intravenous, Continuous, Hardik Rojo MD, Last Rate: 7.5 mL/hr at 07/19/23 0143, 75 mcg/hr at 07/19/23 0143  •  fentanyl citrate (PF) 100 MCG/2ML 50 mcg, 50 mcg, Intravenous, Q1H PRN, Hardik Rojo MD, 50 mcg at 07/19/23 1016  •  heparin (porcine) subcutaneous injection 5,000 Units, 5,000 Units, Subcutaneous, Q8H [address], 5,000 Units at 07/19/23 0524 **AND** [CANCELED] Platelet count, , , Once, [employer], DO  •  linezolid (ZYVOX) IVPB 300 mg, 300 mg, Intravenous, Q12H, Hardik Rojo MD, Stopped at 07/19/23 0452  •  pantoprazole (PROTONIX) injection 40 mg, 40 mg, Intravenous, Q24H [address], Hardik Rojo MD, 40 mg at 07/19/23 0954  •  phenylephrine (SHRAVAN-SYNEPHRINE) 50 mg (STANDARD CONCENTRATION) in sodium chloride 0.9% 250 mL,  mcg/min, Intravenous, Titrated, Hardik Rojo MD, Last Rate: 15 mL/hr at 07/19/23 0536, 50 mcg/min at 07/19/23 0536  •  piperacillin-tazobactam (ZOSYN) 3.375 g in sodium chloride 0.9 % 100 mL IVPB (EXTENDED-INFUSION), 3.375 g, Cali, Candida Montano PA-C    OBJECTIVE:    Vitals:    07/19/23 0700 07/19/23 0800 07/19/23 0900 07/19/23 1015   BP: 114/56 122/64 118/60    BP Location:  Left arm Left arm Pulse: 64 64 62 68   Resp: 14 (!) 10 (!) 11 12   Temp:       TempSrc:       SpO2: 100% 100% 100% 100%   Weight:       Height:            Temp:  [97.8 °F (36.6 °C)-98.2 °F (36.8 °C)] 98.2 °F (36.8 °C)  HR:  [58-74] 68  Resp:  [10-21] 12  BP: (107-122)/(53-64) 118/60  SpO2:  [96 %-100 %] 100 %     Body mass index is 23.44 kg/m². Weight (last 2 days)     Date/Time Weight    07/18/23 0900 54.4 (120)          I/O last 3 completed shifts: In: 2621.1 [I.V.:1767.7; IV Piggyback:353.3]  Out: 1920 [TPYRM:3266]    I/O this shift:  In: 145 [I.V.:45; IV Piggyback:100]  Out: 170 [Urine:170]      Physical exam:    General: Frail and elderly  Eyes: conjunctivae pink, anicteric sclerae  ENT: ET tube in place  Neck: ROM intact, no JVD  Chest: Remains on the ventilator  CVS: normal rate, non pericardial friction rub  Abdomen: soft, non-tender, non-distended, normoactive bowel sounds  Extremities: no edema of both legs  Skin: no rash  Neuro:  Awake nodding her head to questioning  Invasive Devices:   Urethral Catheter Latex 16 Fr.  (Active)   Reasons to continue Urinary Catheter  Post-operative urological requirements 07/18/23 2000   Goal for Removal Remove after 48 hrs of I/O monitoring 07/18/23 1200   Site Assessment Clean;Skin intact 07/19/23 0000   Qureshi Care Done 07/18/23 0900   Collection Container Standard drainage bag 07/18/23 2000   Securement Method Securing device (Describe) 07/18/23 2000   Output (mL) 170 mL 07/19/23 0900     Lab Results:   Results from last 7 days   Lab Units 07/19/23  0523 07/18/23  1427 07/18/23  1100 07/18/23  0516 07/18/23  0029 07/17/23  1453 07/17/23  0446 07/16/23  0438 07/15/23  0954 07/14/23  2244 07/14/23  1737 07/14/23  1735   WBC Thousand/uL 50.63*  --   --  62.59* 72.36* 74.56* 49.23* 46.09*  --    < >  --   --    HEMOGLOBIN g/dL 9.1*  --   --  9.5* 9.8* 9.5* 9.0* 9.2*  --    < >  --   --    HEMATOCRIT % 27.0*  --   --  29.1* 29.5* 28.4* 26.4* 26.7*  --    < >  --   --    PLATELETS Thousands/uL 379  --   --  407* 415* 420* 322 313  --    < >  --   --    POTASSIUM mmol/L 5.4* 5.4*  --  5.6* 5.3 4.9 5.0 4.8  --    < >  --   --    CHLORIDE mmol/L 111* 109*  --  106 106 105 104 104  --    < >  --   --    CO2 mmol/L 19* 19*  --  19* 18* 18* 20* 19*  --    < >  --   --    BUN mg/dL 55* 52*  --  49* 50* 51* 53* 45*  --    < >  --   --    CREATININE mg/dL 1.15 1.17  --  1.21 1.29 1.33* 1.68* 1.82*  --    < >  --   --    CALCIUM mg/dL 8.7 8.8  --  9.0 9.0 8.5 8.4 8.1*  --    < >  --   --    MAGNESIUM mg/dL 2.5 2.5  --  2.3 2.2 2.1  --   --   --    < >  --   --    PHOSPHORUS mg/dL 4.3* 4.6*  --  5.0* 4.9* 3.7  --  2.6  --    < >  --   --    ALK PHOS U/L 226*  --   --  278*  --  352*  --  145*  --   --   --   --    ALT U/L 38  --   --  36  --  50  --  24  --   --   --   --    AST U/L 50*  --   --  49*  --  80*  --  27  --   --   --   --    BLOOD CULTURE   --   --  Received in Microbiology Lab. Culture in Progress. Received in Microbiology Lab. Culture in Progress. --   --   --   --   --   --   --  No Growth After 4 Days. No Growth at 48 hrs. NITRITE UA   --   --   --   --   --   --   --   --  Negative  --   --   --    LEUKOCYTES UA   --   --   --   --   --   --   --   --  Moderate*  --   --   --    BLOOD UA   --   --   --   --   --   --   --   --  Small*  --   --   --     < > = values in this interval not displayed.      Her WBC count is elevated at 50.6  Ionized calcium is low at 1.10  Phosphorus is acceptable at 4.3    Chest x-ray images were reviewed agree with findings, mild pulmonary vascular congestion noted, retrocardiac opacity noted      CT of the pelvis was done on 717 this shows increasing amount of air in the perianal area, cirrhotic liver    ASSESSMENT & PLAN       #1  Acute kidney injury, likely secondary to prerenal azotemia, ATN  • Creatinine is stable around 1.1-1.3  • 1.2 L output, overall positive fluid balance  • Patient's maps have improved and are stable  • pH is acceptable  • History of left nephrectomy for malignancy  • Today creatinine is 1.15, this likely over estimates her GFR as evidenced by a high potassium, low bicarb and elevated phosphorus nevertheless making urine, Qureshi catheter in place and we will monitor     #2  Sepsis  • Secondary to a NSTI UTI  • IV Zosyn and linezolid, infectious disease following  • Washout and debridement per surgical team  • Today notes were reviewed, continuing antibiotics, dressing changes and discussing with family     #3  Low bicarbonate, hyperkalemia in the setting of a white count up to 50,000  • This may be pseudohyperkalemia associated with an elevated WBC count  • Using pH neutral solutions, pH 7.3,  • Nevertheless we will treat with today with start sodium bicarbonate 1300 mg twice daily     #4  Acute hypoxic respiratory failure  • Remains intubated in the ICU on critical care service  • Today we will continue to monitor her clinical condition     #5  Hypotension  • On Mirza-Synephrine  • Keep map above 65  • Continue to keep map above 65, blood pressures have been reviewed today     #6  Anemia in the setting of chronic illness  • Monitor H&H and transfuse as needed     #7  Status post fall  - Ongoing supportive care for rib fractures and sacral fractures     #8 hypoosmolar hyponatremia  - Sodium level stable at 135     #10 cholelithiasis without evidence of acute cholecystitis  - Continue to monitor    DISCUSSION:    Discussed with surgical critical care team and we were in agreement to start the sodium bicarbonate 1300 mg twice daily    Portions of the record may have been created with voice recognition software. Occasional wrong word or "sound a like" substitutions may have occurred due to the inherent limitations of voice recognition software. Read the chart carefully and recognize, using context, where substitutions have occurred. If you have any questions, please contact the dictating provider.

## 2023-07-19 NOTE — QUICK NOTE
Acute Care Surgery  Bedside Dressing Change Procedure Note    Prior to beginning the dressing change, the patient was identified by wrist band. The nurse remained present to confirm the correct dressing counts on removal of the packing and was debriefed at the completion of the dressing change. Location of wound: Perianal    Dressings and Foam removed:  2 radiolabeled kurlex Dressings    Dimensions of wound: 15 cm x 6 cm x 15 cm    Description of wound: On inspection of the wound today, appeared clean. There was no necrotic or nonviable tissue requiring debridement visible during dressing change. See images below          The wound was packed with one betadine soaked Kurlex. This dressing change took greater than 20 minutes to complete.     Pito Reynolds MD  7/19/2023 11:10 AM

## 2023-07-19 NOTE — PLAN OF CARE
Problem: Potential for Falls  Goal: Patient will remain free of falls  Description: INTERVENTIONS:  - Educate patient/family on patient safety including physical limitations  - Instruct patient to call for assistance with activity   - Consult OT/PT to assist with strengthening/mobility   - Keep Call bell within reach  - Keep bed low and locked with side rails adjusted as appropriate  - Keep care items and personal belongings within reach  - Initiate and maintain comfort rounds  - Make Fall Risk Sign visible to staff  - Offer Toileting every 1 Hours, in advance of need  - Initiate/Maintain alarm  - Obtain necessary fall risk management equipment  - Apply yellow socks and bracelet for high fall risk patients  - Consider moving patient to room near nurses station  Outcome: Progressing     Problem: MOBILITY - ADULT  Goal: Maintain or return to baseline ADL function  Description: INTERVENTIONS:  -  Assess patient's ability to carry out ADLs; assess patient's baseline for ADL function and identify physical deficits which impact ability to perform ADLs (bathing, care of mouth/teeth, toileting, grooming, dressing, etc.)  - Assess/evaluate cause of self-care deficits   - Assess range of motion  - Assess patient's mobility; develop plan if impaired  - Assess patient's need for assistive devices and provide as appropriate  - Encourage maximum independence but intervene and supervise when necessary  - Involve family in performance of ADLs  - Assess for home care needs following discharge   - Consider OT consult to assist with ADL evaluation and planning for discharge  - Provide patient education as appropriate  Outcome: Progressing  Goal: Maintains/Returns to pre admission functional level  Description: INTERVENTIONS:  - Perform BMAT or MOVE assessment daily.   - Set and communicate daily mobility goal to care team and patient/family/caregiver.    - Collaborate with rehabilitation services on mobility goals if consulted  - Perform Range of Motion 3 times a day. - Reposition patient every 2 hours.   - Dangle patient 3 times a day  - Stand patient 3 times a day  - Ambulate patient 3 times a day  - Out of bed to chair 3 times a day   - Out of bed for meals 3 times a day  - Out of bed for toileting  - Record patient progress and toleration of activity level   Outcome: Progressing     Problem: PAIN - ADULT  Goal: Verbalizes/displays adequate comfort level or baseline comfort level  Description: Interventions:  - Encourage patient to monitor pain and request assistance  - Assess pain using appropriate pain scale  - Administer analgesics based on type and severity of pain and evaluate response  - Implement non-pharmacological measures as appropriate and evaluate response  - Consider cultural and social influences on pain and pain management  - Notify physician/advanced practitioner if interventions unsuccessful or patient reports new pain  Outcome: Progressing     Problem: INFECTION - ADULT  Goal: Absence or prevention of progression during hospitalization  Description: INTERVENTIONS:  - Assess and monitor for signs and symptoms of infection  - Monitor lab/diagnostic results  - Monitor all insertion sites, i.e. indwelling lines, tubes, and drains  - Monitor endotracheal if appropriate and nasal secretions for changes in amount and color  - [place] appropriate cooling/warming therapies per order  - Administer medications as ordered  - Instruct and encourage patient and family to use good hand hygiene technique  - Identify and instruct in appropriate isolation precautions for identified infection/condition  Outcome: Progressing     Problem: DISCHARGE PLANNING  Goal: Discharge to home or other facility with appropriate resources  Description: INTERVENTIONS:  - Identify barriers to discharge w/patient and caregiver  - Arrange for needed discharge resources and transportation as appropriate  - Identify discharge learning needs (meds, wound care, etc.)  - Arrange for interpretive services to assist at discharge as needed  - Refer to Case Management Department for coordinating discharge planning if the patient needs post-hospital services based on physician/advanced practitioner order or complex needs related to functional status, cognitive ability, or social support system  Outcome: Progressing     Problem: Knowledge Deficit  Goal: Patient/family/caregiver demonstrates understanding of disease process, treatment plan, medications, and discharge instructions  Description: Complete learning assessment and assess knowledge base. Interventions:  - Provide teaching at level of understanding  - Provide teaching via preferred learning methods  Outcome: Progressing     Problem: Prexisting or High Potential for Compromised Skin Integrity  Goal: Skin integrity is maintained or improved  Description: INTERVENTIONS:  - Identify patients at risk for skin breakdown  - Assess and monitor skin integrity  - Assess and monitor nutrition and hydration status  - Monitor labs   - Assess for incontinence   - Turn and reposition patient  - Assist with mobility/ambulation  - Relieve pressure over bony prominences  - Avoid friction and shearing  - Provide appropriate hygiene as needed including keeping skin clean and dry  - Evaluate need for skin moisturizer/barrier cream  - Collaborate with interdisciplinary team   - Patient/family teaching  - Consider wound care consult   Outcome: Progressing     Problem: Nutrition/Hydration-ADULT  Goal: Nutrient/Hydration intake appropriate for improving, restoring or maintaining nutritional needs  Description: Monitor and assess patient's nutrition/hydration status for malnutrition. Collaborate with interdisciplinary team and initiate plan and interventions as ordered. Monitor patient's weight and dietary intake as ordered or per policy. Utilize nutrition screening tool and intervene as necessary.  Determine patient's food preferences and provide high-protein, high-caloric foods as appropriate.      INTERVENTIONS:  - Monitor oral intake, urinary output, labs, and treatment plans  - Assess nutrition and hydration status and recommend course of action  - Evaluate amount of meals eaten  - Assist patient with eating if necessary   - Allow adequate time for meals  - Recommend/ encourage appropriate diets, oral nutritional supplements, and vitamin/mineral supplements  - Order, calculate, and assess calorie counts as needed  - Recommend, monitor, and adjust tube feedings and TPN/PPN based on assessed needs  - Assess need for intravenous fluids  - Provide specific nutrition/hydration education as appropriate  - Include patient/family/caregiver in decisions related to nutrition  Outcome: Progressing     Problem: COPING  Goal: Pt/Family able to verbalize concerns and demonstrate effective coping strategies  Description: INTERVENTIONS:  - Assist patient/family to identify coping skills, available support systems and cultural and spiritual values  - Provide emotional support, including active listening and acknowledgement of concerns of patient and caregivers  - Reduce environmental stimuli, as able  - Provide patient education  - Assess for spiritual pain/suffering and initiate spiritual care, including notification of Pastoral Care or han based community as needed  - Assess effectiveness of coping strategies  Outcome: Progressing  Goal: Will report anxiety at manageable levels  Description: INTERVENTIONS:  - Administer medication as ordered  - Teach and encourage coping skills  - Provide emotional support  - Assess patient/family for anxiety and ability to cope  Outcome: Progressing     Problem: NEUROSENSORY - ADULT  Goal: Achieves stable or improved neurological status  Description: INTERVENTIONS  - Monitor and report changes in neurological status  - Monitor vital signs such as temperature, blood pressure, glucose, and any other labs ordered   - Initiate measures to prevent increased intracranial pressure  - Monitor for seizure activity and implement precautions if appropriate      Outcome: Progressing  Goal: Achieves maximal functionality and self care  Description: INTERVENTIONS  - Monitor swallowing and airway patency with patient fatigue and changes in neurological status  - Encourage and assist patient to increase activity and self care.    - Encourage visually impaired, hearing impaired and aphasic patients to use assistive/communication devices  Outcome: Progressing     Problem: CARDIOVASCULAR - ADULT  Goal: Maintains optimal cardiac output and hemodynamic stability  Description: INTERVENTIONS:  - Monitor I/O, vital signs and rhythm  - Monitor for S/S and trends of decreased cardiac output  - Administer and titrate ordered vasoactive medications to optimize hemodynamic stability  - Assess quality of pulses, skin color and temperature  - Assess for signs of decreased coronary artery perfusion  - Instruct patient to report change in severity of symptoms  Outcome: Progressing  Goal: Absence of cardiac dysrhythmias or at baseline rhythm  Description: INTERVENTIONS:  - Continuous cardiac monitoring, vital signs, obtain 12 lead EKG if ordered  - Administer antiarrhythmic and heart rate control medications as ordered  - Monitor electrolytes and administer replacement therapy as ordered  Outcome: Progressing     Problem: RESPIRATORY - ADULT  Goal: Achieves optimal ventilation and oxygenation  Description: INTERVENTIONS:  - Assess for changes in respiratory status  - Assess for changes in mentation and behavior  - Position to facilitate oxygenation and minimize respiratory effort  - Oxygen administered by appropriate delivery if ordered  - Initiate smoking cessation education as indicated  - Encourage broncho-pulmonary hygiene including cough, deep breathe, Incentive Spirometry  - Assess the need for suctioning and aspirate as needed  - Assess and instruct to report SOB or any respiratory difficulty  - Respiratory Therapy support as indicated  Outcome: Progressing     Problem: GASTROINTESTINAL - ADULT  Goal: Maintains or returns to baseline bowel function  Description: INTERVENTIONS:  - Assess bowel function  - Encourage oral fluids to ensure adequate hydration  - Administer IV fluids if ordered to ensure adequate hydration  - Administer ordered medications as needed  - Encourage mobilization and activity  - Consider nutritional services referral to assist patient with adequate nutrition and appropriate food choices  Outcome: Progressing  Goal: Maintains adequate nutritional intake  Description: INTERVENTIONS:  - Monitor percentage of each meal consumed  - Identify factors contributing to decreased intake, treat as appropriate  - Assist with meals as needed  - Monitor I&O, weight, and lab values if indicated  - Obtain nutrition services referral as needed  Outcome: Progressing     Problem: GENITOURINARY - ADULT  Goal: Maintains or returns to baseline urinary function  Description: INTERVENTIONS:  - Assess urinary function  - Encourage oral fluids to ensure adequate hydration if ordered  - Administer IV fluids as ordered to ensure adequate hydration  - Administer ordered medications as needed  - Offer frequent toileting  - Follow urinary retention protocol if ordered  Outcome: Progressing  Goal: Absence of urinary retention  Description: INTERVENTIONS:  - Assess patient’s ability to void and empty bladder  - Monitor I/O  - Bladder scan as needed  - Discuss with physician/AP medications to alleviate retention as needed  - Discuss catheterization for long term situations as appropriate  Outcome: Progressing  Goal: Urinary catheter remains patent  Description: INTERVENTIONS:  - Assess patency of urinary catheter  - If patient has a chronic russell, consider changing catheter if non-functioning  - Follow guidelines for intermittent irrigation of non-functioning urinary catheter  Outcome: Progressing     Problem: METABOLIC, FLUID AND ELECTROLYTES - ADULT  Goal: Electrolytes maintained within normal limits  Description: INTERVENTIONS:  - Monitor labs and assess patient for signs and symptoms of electrolyte imbalances  - Administer electrolyte replacement as ordered  - Monitor response to electrolyte replacements, including repeat lab results as appropriate  - Instruct patient on fluid and nutrition as appropriate  Outcome: Progressing  Goal: Fluid balance maintained  Description: INTERVENTIONS:  - Monitor labs   - Monitor I/O and WT  - Instruct patient on fluid and nutrition as appropriate  - Assess for signs & symptoms of volume excess or deficit  Outcome: Progressing  Goal: Glucose maintained within target range  Description: INTERVENTIONS:  - Monitor Blood Glucose as ordered  - Assess for signs and symptoms of hyperglycemia and hypoglycemia  - Administer ordered medications to maintain glucose within target range  - Assess nutritional intake and initiate nutrition service referral as needed  Outcome: Progressing     Problem: SKIN/TISSUE INTEGRITY - ADULT  Goal: Skin Integrity remains intact(Skin Breakdown Prevention)  Description: Assess:  -Perform Ismael assessment every shift  -Clean and moisturize skin every shift  -Inspect skin when repositioning, toileting, and assisting with ADLS  -Assess under medical devices every shift  -Assess extremities for adequate circulation and sensation     Bed Management:  -Have minimal linens on bed & keep smooth, unwrinkled  -Change linens as needed when moist or perspiring  -Avoid sitting or lying in one position for more than 2 hours while in bed  -Keep HOB at 30 degrees     Toileting:  -Offer bedside commode  -Assess for incontinence every 2 hours  -Use incontinent care products after each incontinent episode     Activity:  -Mobilize patient 2 times a day  -Encourage activity and walks on unit  -Encourage or provide ROM exercises   -Turn and reposition patient every 2 Hours  -Use appropriate equipment to lift or move patient in bed  -Instruct/ Assist with weight shifting every 30 minutes when out of bed in chair  -Consider limitation of chair time 3 hour intervals    Skin Care:  -Avoid use of baby powder, tape, friction and shearing, hot water or constrictive clothing  -Relieve pressure over bony prominences   -Do not massage red bony areas    Next Steps:  -Teach patient strategies to minimize risks    -Consider consults to  interdisciplinary teams   Outcome: Progressing  Goal: Incision(s), wounds(s) or drain site(s) healing without S/S of infection  Description: INTERVENTIONS  - Assess and document dressing, incision, wound bed, drain sites and surrounding tissue  - Provide patient and family education  - Perform skin care/dressing changes every shift and/or PRN  Outcome: Progressing  Goal: Pressure injury heals and does not worsen  Description: Interventions:  - Implement low air loss mattress or specialty surface (Criteria met)  - Apply silicone foam dressing  - Instruct/assist with weight shifting every 30 minutes when in chair   - Limit chair time to 3 hour intervals  - Use special pressure reducing interventions when in chair   - Apply fecal or urinary incontinence containment device   - Perform passive or active ROM every 2 hours  - Turn and reposition patient & offload bony prominences every 2 hours   - Utilize friction reducing device or surface for transfers   - Consider consults to  interdisciplinary teams   - Use incontinent care products after each incontinent episode   - Consider nutrition services referral as needed  Outcome: Progressing     Problem: HEMATOLOGIC - ADULT  Goal: Maintains hematologic stability  Description: INTERVENTIONS  - Assess for signs and symptoms of bleeding or hemorrhage  - Monitor labs  - Administer supportive blood products/factors as ordered and appropriate  Outcome: Progressing     Problem: MUSCULOSKELETAL - ADULT  Goal: Maintain or return mobility to safest level of function  Description: INTERVENTIONS:  - Assess patient's ability to carry out ADLs; assess patient's baseline for ADL function and identify physical deficits which impact ability to perform ADLs (bathing, care of mouth/teeth, toileting, grooming, dressing, etc.)  - Assess/evaluate cause of self-care deficits   - Assess range of motion  - Assess patient's mobility  - Assess patient's need for assistive devices and provide as appropriate  - Encourage maximum independence but intervene and supervise when necessary  - Involve family in performance of ADLs  - Assess for home care needs following discharge   - Consider OT consult to assist with ADL evaluation and planning for discharge  - Provide patient education as appropriate  Outcome: Progressing

## 2023-07-19 NOTE — RESPIRATORY THERAPY NOTE
07/19/23 1130   Respiratory Assessment   Resp Comments Pt extubated and placed on nasal cannula at 6l/m. No respiratory distress or stridor noted at this time. Will continue to monitor pt.

## 2023-07-19 NOTE — PROGRESS NOTES
[address] [facility]  Progress Note: Critical Care  Name: Devon Harding 80 y.o. female I MRN: [id number]  Unit/Bed#: ICU 15 I Date of Admission: 7/10/2023   Date of Service: 7/19/2023 I Hospital Day: 8    Assessment/Plan   Neuro:   · Analgesia/sedation  · Continuous infusions  · Fentanyl at 75 mcg/hr  · Prn medications  · Fentanyl 50mcg Q1 prn (2 doses/24 hours)  · Daily CAM-ICU, delirium precautions. Regulate sleep/wake cycle. CV:   · Septic shock  · Secondary to NSTI. Remains on zayda at 50, wean for goal MAP > 65. · Hx of HTN  · Hold home cozaar while requiring pressors  · Severe AS, moderate to severe MR, severe MS  · Cardiology consulted. No interventions warranted. Pulm:  · Acute respiratory failure  · AC/VC 14/350/40/6. Daily SBT/SAT. VAP bundle. Goal extubation today. GI:   · Cholelithiasis  · Seen on initial trauma imaging. RUQ U/S w/o evidence of acute cholecystitis. LFTs downtrending. · GI ppx  · Protonix, transition to PO      :   · UTI, POA  · Completed 3 day course of rocephin (7/11-7/13) for E. Coli UTI. · Maintain russell in setting of perianal NSTI  · MAXX - resolved  · Trend strict I/Os, daily renal function      F/E/N:   · No continuous fluids  · Replete electrolytes as needed for goal Mag > 2.0, Phos >3.0, K >4.0  · Jevity 1.2 at 45cc/hr      Heme/Onc:   · DVT ppx  · SQH      Endo:   No active issues    ID:   · R perianal NSTI  · S/p debridement 7/17 and 7/18. Appreciate general surgery recommendations. Local wound care. Continue Zosyn and linezolid. F/u culture data. Trend fever curve/white count      MSK/Skin:   · Sacral and L pubic rami fracture  · Non operative management. WBAT per ortho recommendations  · Local wound care as needed. Frequent turns/repositioning      Disposition: Critical care    ICU Core Measures     Vented Patient  VAP Bundle  VAP bundle ordered     A: Assess, Prevent, and Manage Pain · Has pain been assessed?  Yes  · Need for changes to pain regimen? No   B: Both Spontaneous Awakening Trials (SATs) and Spontaneous Breathing Trials (SBTs) · Plan to perform spontaneous awakening trial today? Yes   · Plan to perform spontaneous breathing trial today? Yes   · Obvious barriers to extubation? No   C: Choice of Sedation · RASS Goal: 0 Alert and Calm  · Need for changes to sedation or analgesia regimen? No   D: Delirium · CAM-ICU: Negative   E: Early Mobility  · Plan for early mobility? Yes   F: Family Engagement · Plan for family engagement today? Yes       Antibiotic Review: Patient on appropriate coverage based on culture data. Review of Invasive Devices: Qureshi Plan: Continue secondary to wounds that would be worsened by incontinence  Central access plan: Medications requiring central line  Viola Plan: Keep arterial line for hemodynamic monitoring    Prophylaxis:  VTE VTE covered by:  heparin (porcine), Subcutaneous, 5,000 Units at 07/19/23 0524       Stress Ulcer  covered bypantoprazole (PROTONIX) injection 40 mg [[id number]]          Subjective   HPI/24hr events: OR yesterday for debridement and washout. Remains intubated. Continues on zayda at 50. Started on TFs overnight.            Objective                            Vitals I/O      Most Recent Min/Max in 24hrs   Temp 98.2 °F (36.8 °C) Temp  Min: 97.8 °F (36.6 °C)  Max: 98.2 °F (36.8 °C)   Pulse 64 Pulse  Min: 54  Max: 74   Resp 14 Resp  Min: 12  Max: 21   /56 BP  Min: 107/53  Max: 121/52   O2 Sat 100 % SpO2  Min: 96 %  Max: 100 %      Intake/Output Summary (Last 24 hours) at 7/19/2023 0717  Last data filed at 7/19/2023 0600  Gross per 24 hour   Intake 1651.9 ml   Output 1220 ml   Net 431.9 ml         Diet Enteral/Parenteral; Tube Feeding No Oral Diet; Jevity 1.2 Navi; Continuous; 45; Prosource Protein Liquid - One Packet Daily; 30; Water; Every 6 hours     Invasive Monitoring Physical exam   Arterial Line  Evangelina /50  Arterial Line BP  Min: 86/62  Max: 154/82   MAP 72 mmHg  Arterial Line MAP (mmHg)  Min: 60 mmHg  Max: 110 mmHg    Physical Exam  Vitals and nursing note reviewed. Constitutional:       General: She is not in acute distress. Appearance: She is not ill-appearing or toxic-appearing. Interventions: She is sedated, intubated and restrained. HENT:      Head: Normocephalic and atraumatic. Eyes:      Pupils: Pupils are equal, round, and reactive to light. Cardiovascular:      Rate and Rhythm: Normal rate and regular rhythm. Heart sounds: Murmur heard. Systolic murmur is present. Pulmonary:      Effort: Pulmonary effort is normal. She is intubated. Breath sounds: No decreased breath sounds, wheezing, rhonchi or rales. Abdominal:      General: There is no distension. Palpations: Abdomen is soft. Tenderness: There is no abdominal tenderness. Musculoskeletal:      Right lower leg: No edema. Left lower leg: No edema. Skin:     General: Skin is warm and dry. Neurological:      Mental Status: She is alert. GCS: GCS eye subscore is 4. GCS verbal subscore is 1. GCS motor subscore is 6. Psychiatric:         Behavior: Behavior is cooperative. Diagnostic Studies        Imaging: No new imaging I have personally reviewed pertinent reports.        Medications:  Scheduled PRN   calcium gluconate, 2 g, Once  carbamide peroxide, 5 drop, BID  chlorhexidine, 15 mL, Q12H NELSY  heparin (porcine), 5,000 Units, Q8H [address]  linezolid, 300 mg, Q12H  pantoprazole, 40 mg, Q24H NELSY  piperacillin-tazobactam, 3.375 g, Q12H      fentanyl citrate (PF), 50 mcg, Q1H PRN       Continuous    fentaNYL, 75 mcg/hr, Last Rate: 75 mcg/hr (07/19/23 0143)  phenylephine,  mcg/min, Last Rate: 50 mcg/min (07/19/23 0536)         Labs:    CBC    Recent Labs     07/18/23  0516 07/19/23  0523   WBC 62.59* 50.63*   HGB 9.5* 9.1*   HCT 29.1* 27.0*   * 379   BANDSPCT 3  --      BMP    Recent Labs     07/18/23  1427 07/19/23  0523   SODIUM 134* 137   K 5.4* 5.4*   * 111*   CO2 19* 19*   AGAP 6 7   BUN 52* 55*   CREATININE 1.17 1.15   CALCIUM 8.8 8.7       Coags    No recent results     Additional Electrolytes  Recent Labs     07/18/23  0516 07/18/23 1427 07/19/23 0523   MG 2.3 2.5 2.5   PHOS 5.0* 4.6* 4.3*   CAIONIZED 1.17  --  1.10*          Blood Gas    Recent Labs     07/18/23  0030   PHART 7.310*   KKQ4JOG 32.7*   PO2ART 134.4*   DMZ6VQC 16.1*   BEART -9.2   SOURCE Line, Arterial     Recent Labs     07/18/23 0027 07/18/23  0030   PHVEN 7.247*  --    FAK3MPO 45.9  --    PO2VEN 39.4  --    ARQ2DKN 19.5*  --    BEVEN -7.5  --    SOURCE  --  Line, Arterial    LFTs  Recent Labs     07/18/23 0516 07/18/23 1427 07/19/23 0523   ALT 36  --  38   AST 49*  --  50*   ALKPHOS 278*  --  226*   ALB 1.6* 1.6* 1.9*   TBILI 0.37  --  0.43       Infectious  No recent results  Glucose  Recent Labs     07/18/23  0029 07/18/23  0516 07/18/23 1427 07/19/23 0523   GLUC 156*  [place], [place]

## 2023-07-19 NOTE — RESPIRATORY THERAPY NOTE
RT Ventilator Management Note  Adolmelia Stains 80 y.o. female MRN: [id number]  Unit/Bed#: ICU 14 Encounter: [id number]      Daily Screen         7/18/2023  0726 7/19/2023  0717          Patient safety screen outcome[de-identified] Passed Passed      Spont breathing trial % for 30 min: Yes Yes      Spont breathing trial outcome[de-identified] -- Passed (P)                 Physical Exam:   Assessment Type: Assess only  Bilateral Breath Sounds: Clear  Suction: ET Tube      Resp Comments: Pt awake, placed on Spont mode. BS clear. Will continue to monitor pt.

## 2023-07-19 NOTE — SPEECH THERAPY NOTE
Speech-Language Pathology Bedside Swallow Evaluation    Patient Name: Marcelo Smalls    ZCCEQ'U Date: 7/19/2023     Problem List  Principal Problem:    Generalized weakness  Active Problems:    Acute UTI (urinary tract infection)    Closed nondisplaced zone II fracture of sacrum (HCC)    Hyponatremia    Anemia    Primary hypertension    Raynaud phenomenon    Fall    Bilateral fracture of pubic rami, sequela    Sepsis with acute organ dysfunction (HCC)    Cholelithiasis    History of rib fracture    Bilateral pleural effusion    MAXX (acute kidney injury) ([address])    Perianal abscess    Acute encephalopathy    Ventilator dependence ([address])    Past Medical History  Past Medical History:   Diagnosis Date   • Cancer ([address])     Cervical, Kidney, melanoma Per MRI Screening form 2/5/19   • 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 Symptoms or concerns included decreased mastication and suspected mild pharyngeal swallow delay. No overt coughing noted with PO trials. Mild intermittent throat clearing occurred however vocal quality remained clear throughout trials. ST will f/u for analysis of diet tolerance.      Risk/s for Aspiration: mild    Recommended Diet: mechanically altered/level 2 diet and thin liquids   Recommended Form of Meds: crushed with puree   Aspiration precautions and swallowing strategies: upright posture, only feed when fully alert, slow rate of feeding and small bites/sips  Other Recommendations: Continue frequent oral care      Current Medical Status per gen surgery note 7/19/23  Assessment:  80 y.o. female  with NSTI of the right perineal area involving the right external sphincter. Most recent debridement 7/18. WBC 50 from 60  Mirza @ 50  Vent: 6 PEEP, 40%  Plan:  - Continue Zosyn and Linezolid. ID consulted, appreciate recs  - Plan for bedside dressing change today, following bedside dressing change, plan for possible extubation.  - Will have goals of care conversation with patient when able, ongoing with family  - Continue enteral nutrition    per H&P 7/11/23  Kedar Pitt is a 80 y.o. female who presents with fall. Jodine Mal off toilet onto her buttock, no head strike, no LOC, no thinners. Difficulty ambulating x2 weeks after a mechanical fall 2 weeks ago. Was seen at urgent care with negative pelvis XR and discharged. Pain worse today after fall and unable to ambulate. At rest, patient has no complaints. Also had fever and chills about a week ago, on arrival temp 100.4. Pt reports dysuria and urinary frequency for past few days. In ED, pt had positive UA and started on rocephin.      Special Studies:  CXR 7/17/23 IMPRESSION:  1. Persistent retrocardiac opacity favored to represent pleural effusion/atelectasis versus infiltrate. 2. Interval placement of right IJ central line with its tip in the lower SVC. .  3. Unchanged appearance of enteric tube within the distal esophagus with kinking. Advise repositioning. 4. Mild pulmonary vascular congestion.     Social/Education/Vocational Hx:  Pt lives alone    Swallow Information   Current Risks for Dysphagia & Aspiration: recent intubation  Current Symptoms/Concerns: coughing with po  Current Diet: NPO   Baseline Diet: regular diet and thin liquids      Baseline Assessment   Behavior/Cognition: alert  Speech/Language Status: able to participate in basic conversation and able to follow commands  Patient Positioning: upright in bed  Pain Status/Interventions/Response to Interventions: No report of or nonverbal indications of pain. Swallow Mechanism Exam  Facial: symmetrical  Labial: WFL  Lingual: decreased coordination  Velum: symmetrical  Mandible: adequate ROM  Dentition: limited dentition  Vocal quality:clear/adequate   Volitional Cough: adequate   Respiratory Status: on 6L O2     Consistencies Assessed and Performance   Consistencies Administered: thin liquids, puree and solid (stacy crackers)    Oral Stage: mild  Mastication was reduced but adequate with the materials administered today. Upper partial missing. Bolus formation and transfer were slow but functional with no significant oral residue noted. No overt s/s reduced oral control. Pharyngeal Stage: mild  Swallow Mechanics: Swallowing initiation appeared mildly delayed. Laryngeal rise was palpated and judged to be within functional limits. No coughing noted however pt had mild intermittent throat clearing, no change in vocal quality or respiratory status noted today. Esophageal Concerns: none reported      Summary and Recommendations (see above)    Results Reviewed with: patient and RN     Treatment Recommended: yes     Frequency of treatment: 2-3x/week    Dysphagia LTG  -Patient will demonstrate safe and effective oral intake (without overt s/s significant oral/pharyngeal dysphagia including s/s penetration or aspiration) for the highest appropriate diet level. Short Term Goals:  -Pt will tolerate Dysphagia 2/mechanical soft diet and thin liquid with no significant s/s oral or pharyngeal dysphagia across 1-3 diagnostic sessions.    -Patient will tolerate trials of upgraded food texture with no significant s/s of oral or pharyngeal dysphagia including aspiration across 1-3 diagnostic sessions.

## 2023-07-20 ENCOUNTER — ANESTHESIA EVENT (INPATIENT)
Dept: PERIOP | Facility: HOSPITAL | Age: 88
DRG: 853 | End: 2023-07-20
Payer: COMMERCIAL

## 2023-07-20 ENCOUNTER — ANESTHESIA (INPATIENT)
Dept: PERIOP | Facility: HOSPITAL | Age: 88
DRG: 853 | End: 2023-07-20
Payer: COMMERCIAL

## 2023-07-20 RX ORDER — GLYCOPYRROLATE 0.2 MG/ML
INJECTION INTRAMUSCULAR; INTRAVENOUS AS NEEDED
Status: DISCONTINUED | OUTPATIENT
Start: 2023-07-20 | End: 2023-07-20

## 2023-07-20 RX ORDER — ALBUMIN, HUMAN INJ 5% 5 %
SOLUTION INTRAVENOUS CONTINUOUS PRN
Status: DISCONTINUED | OUTPATIENT
Start: 2023-07-20 | End: 2023-07-20

## 2023-07-20 RX ORDER — SODIUM CHLORIDE, SODIUM LACTATE, POTASSIUM CHLORIDE, CALCIUM CHLORIDE 600; 310; 30; 20 MG/100ML; MG/100ML; MG/100ML; MG/100ML
INJECTION, SOLUTION INTRAVENOUS CONTINUOUS PRN
Status: DISCONTINUED | OUTPATIENT
Start: 2023-07-20 | End: 2023-07-20

## 2023-07-20 RX ORDER — ROCURONIUM BROMIDE 10 MG/ML
INJECTION, SOLUTION INTRAVENOUS AS NEEDED
Status: DISCONTINUED | OUTPATIENT
Start: 2023-07-20 | End: 2023-07-20

## 2023-07-20 RX ORDER — PROPOFOL 10 MG/ML
INJECTION, EMULSION INTRAVENOUS AS NEEDED
Status: DISCONTINUED | OUTPATIENT
Start: 2023-07-20 | End: 2023-07-20

## 2023-07-20 RX ORDER — NEOSTIGMINE METHYLSULFATE 1 MG/ML
INJECTION INTRAVENOUS AS NEEDED
Status: DISCONTINUED | OUTPATIENT
Start: 2023-07-20 | End: 2023-07-20

## 2023-07-20 RX ORDER — CEFAZOLIN SODIUM 1 G/3ML
INJECTION, POWDER, FOR SOLUTION INTRAMUSCULAR; INTRAVENOUS AS NEEDED
Status: DISCONTINUED | OUTPATIENT
Start: 2023-07-20 | End: 2023-07-20

## 2023-07-20 RX ORDER — FENTANYL CITRATE 50 UG/ML
INJECTION, SOLUTION INTRAMUSCULAR; INTRAVENOUS AS NEEDED
Status: DISCONTINUED | OUTPATIENT
Start: 2023-07-20 | End: 2023-07-20

## 2023-07-20 RX ORDER — METRONIDAZOLE 500 MG/100ML
INJECTION, SOLUTION INTRAVENOUS CONTINUOUS PRN
Status: DISCONTINUED | OUTPATIENT
Start: 2023-07-20 | End: 2023-07-20

## 2023-07-20 RX ORDER — PHENYLEPHRINE HCL IN 0.9% NACL 1 MG/10 ML
SYRINGE (ML) INTRAVENOUS AS NEEDED
Status: DISCONTINUED | OUTPATIENT
Start: 2023-07-20 | End: 2023-07-20

## 2023-07-20 RX ADMIN — PROPOFOL 40 MG: 10 INJECTION, EMULSION INTRAVENOUS at 19:42

## 2023-07-20 RX ADMIN — GLYCOPYRROLATE 0.4 MG: 0.2 INJECTION, SOLUTION INTRAMUSCULAR; INTRAVENOUS at 21:06

## 2023-07-20 RX ADMIN — PROPOFOL 30 MG: 10 INJECTION, EMULSION INTRAVENOUS at 20:25

## 2023-07-20 RX ADMIN — FENTANYL CITRATE 50 MCG: 50 INJECTION INTRAMUSCULAR; INTRAVENOUS at 19:39

## 2023-07-20 RX ADMIN — CEFAZOLIN 1000 MG: 1 INJECTION, POWDER, FOR SOLUTION INTRAMUSCULAR; INTRAVENOUS at 19:36

## 2023-07-20 RX ADMIN — METRONIDAZOLE: 500 INJECTION, SOLUTION INTRAVENOUS at 19:36

## 2023-07-20 RX ADMIN — NEOSTIGMINE METHYLSULFATE 3 MG: 1 INJECTION INTRAVENOUS at 21:06

## 2023-07-20 RX ADMIN — SODIUM CHLORIDE, SODIUM LACTATE, POTASSIUM CHLORIDE, AND CALCIUM CHLORIDE: .6; .31; .03; .02 INJECTION, SOLUTION INTRAVENOUS at 18:58

## 2023-07-20 RX ADMIN — ROCURONIUM BROMIDE 50 MG: 10 INJECTION, SOLUTION INTRAVENOUS at 19:11

## 2023-07-20 RX ADMIN — FENTANYL CITRATE 50 MCG: 50 INJECTION INTRAMUSCULAR; INTRAVENOUS at 19:00

## 2023-07-20 RX ADMIN — PROPOFOL 80 MG: 10 INJECTION, EMULSION INTRAVENOUS at 19:11

## 2023-07-20 RX ADMIN — ALBUMIN (HUMAN): 12.5 INJECTION, SOLUTION INTRAVENOUS at 20:25

## 2023-07-20 RX ADMIN — Medication 100 MCG: at 19:11

## 2023-07-20 NOTE — ANESTHESIA PROCEDURE NOTES
Arterial Line Insertion    Performed by: Dianne Oakes CRNA  Authorized by: So Matias MD  Consent: Verbal consent obtained. Written consent obtained. Patient understanding: patient states understanding of the procedure being performed  Patient consent: the patient's understanding of the procedure matches consent given  Procedure consent: procedure consent matches procedure scheduled  Relevant documents: relevant documents present and verified  Test results: test results available and properly labeled  Required items: required blood products, implants, devices, and special equipment available  Patient identity confirmed: verbally with patient, arm band, provided demographic data and hospital-assigned identification number  Time out: Immediately prior to procedure a "time out" was called to verify the correct patient, procedure, equipment, support staff and site/side marked as required. Preparation: Patient was prepped and draped in the usual sterile fashion.   Indications: hemodynamic monitoring  Orientation:  Right  Location: radial artery  Procedure Details:  Needle gauge: 20  Seldinger technique: Seldinger technique used  Number of attempts: 2    Post-procedure:  Waveform: good waveform and waveform confirmed  Post-procedure CNS: normal  Patient tolerance: patient tolerated the procedure well with no immediate complications

## 2023-07-20 NOTE — ANESTHESIA PREPROCEDURE EVALUATION
Procedure:  DEBRIDEMENT WOUND [facility] OUT) (Bilateral: Buttocks)  COLOSTOMY LAPAROSCOPIC  POSSIBLE OPEN (Abdomen)    Relevant Problems   CARDIO   (+) Nonrheumatic aortic valve stenosis   (+) Primary hypertension      /RENAL   (+) MAXX (acute kidney injury) (HCC)      HEMATOLOGY   (+) Anemia      MUSCULOSKELETAL   (+) Arthritis of carpometacarpal (CMC) joint of right thumb   (+) Primary osteoarthritis of right wrist      PULMONARY   (+) Bilateral pleural effusion        Physical Exam    Airway    Mallampati score: II         Dental   No notable dental hx     Cardiovascular      Pulmonary      Other Findings        Anesthesia Plan  ASA Score- 4     Anesthesia Type- general with ASA Monitors. Additional Monitors: arterial line. Airway Plan: ETT. Comment: I, Dr. Rachelle Noble, the attending physician, have personally seen and evaluated the patient prior to anesthetic care. I have reviewed the pre-anesthetic record, and other medical records if appropriate to the anesthetic care. If a CRNA is involved in the case, I have reviewed the CRNA assessment, if present, and agree. The patient is in a suitable condition to proceed with my formulated anesthetic plan. .       Plan Factors-    Chart reviewed. Induction- intravenous. Postoperative Plan-     Informed Consent- Anesthetic plan and risks discussed with patient. I personally reviewed this patient with the CRNA. Discussed and agreed on the Anesthesia Plan with the CRNA. Brionna Brooms

## 2023-07-20 NOTE — CONSULTS
Consultation - [address] [facility] y.o. female MRN: [id number]  Unit/Bed#: ICU 14 Encounter: [id number]    Assessment:  Necrotizing soft tissue infection   Attention to Colostomy  Encounter for ostomy care education      Plan:  • Ostomy marking completed today. o Patient seen in ICU. Unable to stand for assessment. The patient was visualized in the sitting and laying positions to assess the contours of the abdomen. The rectus muscle was identified by patient coughing/performing crunch style manuever. o Patient wears pants at the level of the umbilicus where there is a crease. Upper quadrants with little territory due to patient being short waisted and are not in good view for the patient due to her breasts. These areas were avoiding during the marking. o The patient was ultimately marked in the RLQ and LLQ. These  areas are flat, within the rectus muscle, and are in good view of the patient. It is away from bony prominences, scars and the umbilicus. The skin was marked using a skin marker and marking was covered using a tegaderm dressing. • Patient made aware that stoma marking is a guide, and the final location of the stoma will be determined by the surgeon. • Patient verbalized understanding. • Wound care team will follow along with patient during inpatient stay for ostomy teaching and education. • Discussed with primary team.     History of Present Illness:  Patient is a 80year old female who is scheduled to under go wound washout of the right perineal area and possible laparoscopic colostomy today with surgery team. Wound care consulted for stoma marking. Per chart review wound extends to the right external sphincter. Patient seen in ICU and was extubated yesterday. Patient is aware of potential need for ostomy, and states she knows she will need to wear a bag to contain her stool. Reviewed rationale of stoma marking with patient, and they are agreeable to proceed. Subjective:    Review of Systems   Constitutional: Positive for fatigue. Negative for chills and fever. HENT: Negative for congestion and sneezing. Respiratory: Negative for cough and shortness of breath. Gastrointestinal: Negative for abdominal distention and abdominal pain. Musculoskeletal: Positive for gait problem. Skin: Positive for wound. Psychiatric/Behavioral: Negative for agitation. 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     Past Surgical History:   Procedure Laterality Date   • NEPHRECTOMY Left    • WOUND DEBRIDEMENT N/A 7/17/2023    Procedure: DEBRIDEMENT WOUND [facility] OUT); Surgeon: Eulalia Ingram MD;  [facility];  Service: Trauma   • WOUND DEBRIDEMENT N/A 7/18/2023    Procedure: DEBRIDEMENT WOUND AND DRESSING CHANGE [facility] OUT);   Surgeon: Eulalia Ingram MD;  Location[facility];  Service: General     Social History   Social History     Substance and Sexual Activity   Alcohol Use Not Currently     Social History     Substance and Sexual Activity   Drug Use Not Currently     E-Cigarette/Vaping     E-Cigarette/Vaping Substances   • Nicotine No    • THC No    • CBD No    • Flavoring No    • Other No    • Unknown No      Social History     Tobacco Use   Smoking Status Never   Smokeless Tobacco Never     Family History:   Family History   Problem Relation Age of Onset   • Brain cancer Other        Meds/Allergies   current meds:   Current Facility-Administered Medications   Medication Dose Route Frequency   • acetaminophen (TYLENOL) tablet 650 mg  650 mg Oral Q6H PRN   • carbamide peroxide (DEBROX) 6.5 % otic solution 5 drop  5 drop Both Ears BID   • chlorhexidine (PERIDEX) 0.12 % oral rinse 15 mL  15 mL Mouth/Throat Q12H [facility]   • heparin (porcine) subcutaneous injection 5,000 Units  5,000 Units Subcutaneous Q8H [facility]   • HYDROmorphone HCl (DILAUDID) injection 0.2 mg  0.2 mg Intravenous Q4H PRN   • linezolid (ZYVOX) IVPB 300 mg  300 mg Intravenous Q12H   • melatonin tablet 3 mg  3 mg Oral HS   • multi-electrolyte (PLASMALYTE-A/ISOLYTE-S PH 7.4) IV solution  75 mL/hr Intravenous Continuous   • oxyCODONE (ROXICODONE) IR tablet 5 mg  5 mg Oral Q4H PRN   • oxyCODONE (ROXICODONE) split tablet 2.5 mg  2.5 mg Oral Q4H PRN   • pantoprazole (PROTONIX) EC tablet 40 mg  40 mg Oral Early Morning   • phenylephrine (SHRAVAN-SYNEPHRINE) 50 mg (STANDARD CONCENTRATION) in sodium chloride 0.9% 250 mL   mcg/min Intravenous Titrated   • piperacillin-tazobactam (ZOSYN) 3.375 g in sodium chloride 0.9 % 100 mL IVPB (EXTENDED-INFUSION)  3.375 g Intravenous Q8H   • sodium bicarbonate tablet 1,300 mg  1,300 mg Oral BID after meals     No Known Allergies    Objective   Vitals: Blood pressure 110/57, pulse 78, temperature 98 °F (36.7 °C), temperature source Oral, resp. rate (!) 25, height 5' (1.524 m), weight 54.4 kg (120 lb), SpO2 100 %. Physical Exam  Constitutional:       General: She is awake. She is not in acute distress. Appearance: She is not diaphoretic. HENT:      Head: Normocephalic and atraumatic. Right Ear: External ear normal.      Left Ear: External ear normal.   Eyes:      Conjunctiva/sclera: Conjunctivae normal.   Pulmonary:      Effort: Pulmonary effort is normal. No respiratory distress. Abdominal:      General: Abdomen is flat. There is no distension. Palpations: Abdomen is soft. Tenderness: There is no abdominal tenderness. Hernia: No hernia is present. Comments: No apparent hernia. No surgical scars. Skin:     General: Skin is warm and dry. Neurological:      Mental Status: She is alert and oriented to person, place, and time. Gait: Gait abnormal.   Psychiatric:         Behavior: Behavior is cooperative. Lab, Imaging and other studies: I have personally reviewed pertinent reports. Code Status: Level 3 - DNAR and DNI      Counseling / Coordination of Care  Total time spent today:    Total time (face-to-face and non-face-to-face) spent on today's visit was 25 minutes. This includes preparation for the visits (H&P on 7/11/23 and surgery note on 7/20/23) performance of a medically appropriate history and examination, and orders for medications/treatments or testing. Discussed assessment findings, and plan of care/recommendations with patients RN. CARMITA Godinez, FNP-C, YUKI      Portions of the record may have been created with voice recognition software. Occasional wrong word or "sound a like" substitutions may have occurred due to the inherent limitations of voice recognition software.   Read the chart carefully and recognize, using context, where substitutions have occurred

## 2023-07-20 NOTE — CASE MANAGEMENT
Case Management Discharge Planning Note    Patient name Miranda Closs  Location ICU 14/ICU 14 MRN [id number]  : 3/12/1928 Date 2023       Current Admission Date: 7/10/2023  Current Admission Diagnosis:Generalized weakness   Patient Active Problem List    Diagnosis Date Noted   • Ventilator dependence ([address]) 2023   • Perianal abscess 2023   • Acute encephalopathy 2023   • MAXX (acute kidney injury) ([address]) 07/15/2023   • Sepsis with acute organ dysfunction ([address]) 2023   • Cholelithiasis 2023   • History of rib fracture 2023   • Bilateral pleural effusion 2023   • Bilateral fracture of pubic rami, sequela 2023   • Fall 2023   • Generalized weakness 2023   • Acute UTI (urinary tract infection) 2023   • Closed nondisplaced zone II fracture of sacrum ([address]) 2023   • Hyponatremia 2023   • Anemia 2023   • H/O left nephrectomy 2023   • Raynaud phenomenon 2023   • Arthritis of carpometacarpal (CMC) joint of right thumb 2022   • Primary osteoarthritis of right wrist 2022   • Closed fracture of right proximal humerus 11/10/2022   • Nonrheumatic aortic valve stenosis 2017   • Primary hypertension 2014   • Left ventricular hypertrophy 2014      LOS (days): 9  Geometric Mean LOS (GMLOS) (days): 9.60  Days to GMLOS:0.7     OBJECTIVE:  Risk of Unplanned Readmission Score: 18.08         Current admission status: Inpatient   Preferred Pharmacy:   [facility], [address] [place] [id number]  Phone: [phone] Fax: [phone]    Primary Care Provider: Major Rudd MD    Primary Insurance: [facility]  Secondary Insurance:     DISCHARGE DETAILS:    Noted that pt is now extubated. Will need therapy to evaluate again. D/C plan likely continues to remain to d/c to [facility] when appropriate.

## 2023-07-20 NOTE — OCCUPATIONAL THERAPY NOTE
Occupational Therapy Cancel Note       07/20/23 5697   OT Last Visit   OT Visit Date 07/20/23   Note Type   Note type Cancelled Session   Cancel Reasons Patient to operating room   Additional Comments Plan for OR today for washout and possible colosotmy.  Will continue to follow as able         Chata Watts, OT

## 2023-07-20 NOTE — PROGRESS NOTES
Progress Note - General Surgery  Mesha Gutierrez 80 y.o. female MRN: [id number]  Unit/Bed#: ICU 14 Encounter: [id number]      Assessment:  80 y.o. female  with NSTI of the right perineal area involving the right external sphincter. -s/p washout and debridement on 7/17  -washout and debridement on 7/18    Afebrile, other vitals stable, on 4L NC  WBC 35 (50)  Hgb 8.1 (9.1)  Cr 0.94    Plan:  - Plan for OR today for washout and debridement, possible laparoscopic colostomy  -Continue IV abx with zosyn and linezolid until adequate source control is obtained. Cultures have unfortunately have had no growth. Rec MRSA culture. If negative could transition to Unasyn once adequate source control is obtained. -NPO for OR  -Pain/nausea control prn  -Remainder of care per [facility]      Subjective:   Extubated yesterday. Doing well. Pain is controlled. Denies any nausea or vomiting. Tolerating diet w/o issue. No fevers/chills. Objective:   Temp:  [97.7 °F (36.5 °C)-98.6 °F (37 °C)] 98 °F (36.7 °C)  HR:  [62-92] 88  Resp:  [10-29] 22  BP: ()/(39-69) 112/60  Arterial Line BP: ()/(44-98) 118/74    Physical Exam:  General: NAD  HENT: NCAT MMM  Neck: supple, no JVD  CV: nl rate  Lungs: nl wob. No resp distress. On 4L NC  ABD: Soft, nontender, nondistended  Extrem: Perineal dressing in place with mild saturation. Neuro: AAOx3        I/O       07/17 0701  07/18 0700 07/18 0701  07/19 0700 07/19 0701  07/20 0700    P. O.       I.V. (mL/kg) 1676.7 (30.8) 901.9 (16.6)     IV Piggyback 103.3 250     Cell Saver  500     Total Intake(mL/kg) 1780 (32.7) 1651.9 (30.4)     Urine (mL/kg/hr) 1550 (1.2) 1220 (0.9)     Stool 0 0     Blood 5      Total Output 1555 1220     Net +225 +431.9            Unmeasured Stool Occurrence 0 x            Lab, Imaging and other studies: I have personally reviewed pertinent reports.   , CBC with diff:   Lab Results   Component Value Date    WBC 35.22 (HH) 07/20/2023    HGB 8.1 (L) 07/20/2023    HCT 25.3 (L) 07/20/2023    MCV 89 07/20/2023     07/20/2023    RBC 2.83 (L) 07/20/2023    MCH 28.6 07/20/2023    MCHC 32.0 07/20/2023    RDW 14.8 07/20/2023    MPV 9.0 07/20/2023   , BMP/CMP:   Lab Results   Component Value Date    SODIUM 140 07/20/2023    K 4.6 07/20/2023     (H) 07/20/2023    CO2 22 07/20/2023    BUN 45 (H) 07/20/2023    CREATININE 0.94 07/20/2023    CALCIUM 8.5 07/20/2023    EGFR 51 07/20/2023           Vishal Schafer DO  7/20/2023 8:45 AM

## 2023-07-20 NOTE — PLAN OF CARE
Problem: Potential for Falls  Goal: Patient will remain free of falls  Description: INTERVENTIONS:  - Educate patient/family on patient safety including physical limitations  - Instruct patient to call for assistance with activity   - Consult OT/PT to assist with strengthening/mobility   - Keep Call bell within reach  - Keep bed low and locked with side rails adjusted as appropriate  - Keep care items and personal belongings within reach  - Initiate and maintain comfort rounds  - Make Fall Risk Sign visible to staff  - Offer Toileting every 1 Hours, in advance of need  - Initiate/Maintain alarm  - Obtain necessary fall risk management equipment  - Apply yellow socks and bracelet for high fall risk patients  - Consider moving patient to room near nurses station  Outcome: Progressing     Problem: MOBILITY - ADULT  Goal: Maintain or return to baseline ADL function  Description: INTERVENTIONS:  -  Assess patient's ability to carry out ADLs; assess patient's baseline for ADL function and identify physical deficits which impact ability to perform ADLs (bathing, care of mouth/teeth, toileting, grooming, dressing, etc.)  - Assess/evaluate cause of self-care deficits   - Assess range of motion  - Assess patient's mobility; develop plan if impaired  - Assess patient's need for assistive devices and provide as appropriate  - Encourage maximum independence but intervene and supervise when necessary  - Involve family in performance of ADLs  - Assess for home care needs following discharge   - Consider OT consult to assist with ADL evaluation and planning for discharge  - Provide patient education as appropriate  Outcome: Progressing  Goal: Maintains/Returns to pre admission functional level  Description: INTERVENTIONS:  - Perform BMAT or MOVE assessment daily.   - Set and communicate daily mobility goal to care team and patient/family/caregiver.    - Collaborate with rehabilitation services on mobility goals if consulted  - Perform Range of Motion 3 times a day. - Reposition patient every 2 hours.   - Dangle patient 3 times a day  - Stand patient 3 times a day  - Ambulate patient 3 times a day  - Out of bed to chair 3 times a day   - Out of bed for meals 3 times a day  - Out of bed for toileting  - Record patient progress and toleration of activity level   Outcome: Progressing     Problem: PAIN - ADULT  Goal: Verbalizes/displays adequate comfort level or baseline comfort level  Description: Interventions:  - Encourage patient to monitor pain and request assistance  - Assess pain using appropriate pain scale  - Administer analgesics based on type and severity of pain and evaluate response  - Implement non-pharmacological measures as appropriate and evaluate response  - Consider cultural and social influences on pain and pain management  - Notify physician/advanced practitioner if interventions unsuccessful or patient reports new pain  Outcome: Progressing     Problem: INFECTION - ADULT  Goal: Absence or prevention of progression during hospitalization  Description: INTERVENTIONS:  - Assess and monitor for signs and symptoms of infection  - Monitor lab/diagnostic results  - Monitor all insertion sites, i.e. indwelling lines, tubes, and drains  - Monitor endotracheal if appropriate and nasal secretions for changes in amount and color  - [place] appropriate cooling/warming therapies per order  - Administer medications as ordered  - Instruct and encourage patient and family to use good hand hygiene technique  - Identify and instruct in appropriate isolation precautions for identified infection/condition  Outcome: Progressing     Problem: DISCHARGE PLANNING  Goal: Discharge to home or other facility with appropriate resources  Description: INTERVENTIONS:  - Identify barriers to discharge w/patient and caregiver  - Arrange for needed discharge resources and transportation as appropriate  - Identify discharge learning needs (meds, wound care, etc.)  - Arrange for interpretive services to assist at discharge as needed  - Refer to Case Management Department for coordinating discharge planning if the patient needs post-hospital services based on physician/advanced practitioner order or complex needs related to functional status, cognitive ability, or social support system  Outcome: Progressing     Problem: Prexisting or High Potential for Compromised Skin Integrity  Goal: Skin integrity is maintained or improved  Description: INTERVENTIONS:  - Identify patients at risk for skin breakdown  - Assess and monitor skin integrity  - Assess and monitor nutrition and hydration status  - Monitor labs   - Assess for incontinence   - Turn and reposition patient  - Assist with mobility/ambulation  - Relieve pressure over bony prominences  - Avoid friction and shearing  - Provide appropriate hygiene as needed including keeping skin clean and dry  - Evaluate need for skin moisturizer/barrier cream  - Collaborate with interdisciplinary team   - Patient/family teaching  - Consider wound care consult   Outcome: Progressing     Problem: Nutrition/Hydration-ADULT  Goal: Nutrient/Hydration intake appropriate for improving, restoring or maintaining nutritional needs  Description: Monitor and assess patient's nutrition/hydration status for malnutrition. Collaborate with interdisciplinary team and initiate plan and interventions as ordered. Monitor patient's weight and dietary intake as ordered or per policy. Utilize nutrition screening tool and intervene as necessary. Determine patient's food preferences and provide high-protein, high-caloric foods as appropriate.      INTERVENTIONS:  - Monitor oral intake, urinary output, labs, and treatment plans  - Assess nutrition and hydration status and recommend course of action  - Evaluate amount of meals eaten  - Assist patient with eating if necessary   - Allow adequate time for meals  - Recommend/ encourage appropriate diets, oral nutritional supplements, and vitamin/mineral supplements  - Order, calculate, and assess calorie counts as needed  - Recommend, monitor, and adjust tube feedings and TPN/PPN based on assessed needs  - Assess need for intravenous fluids  - Provide specific nutrition/hydration education as appropriate  - Include patient/family/caregiver in decisions related to nutrition  Outcome: Progressing

## 2023-07-20 NOTE — PROGRESS NOTES
NEPHROLOGY PROGRESS NOTE   Lynn Hall 80 y.o. female MRN: [id number]  Unit/Bed#: ICU 14 Encounter: [id number]        SUBJECTIVE:    The patient was seen today, she was extubated and family was at her bedside  She has some complaints of some increased edema  Otherwise no acute complaints    12 point review of systems was otherwise negative besides what is mentioned above.     Medications:    Current Facility-Administered Medications:   •  acetaminophen (TYLENOL) tablet 650 mg, 650 mg, Oral, Q6H PRN, Trav Stewart PA-C  •  carbamide peroxide (DEBROX) 6.5 % otic solution 5 drop, 5 drop, Both Ears, BID, Matt May MD, 5 drop at 07/20/23 0809  •  chlorhexidine (PERIDEX) 0.12 % oral rinse 15 mL, 15 mL, Mouth/Throat, Q12H [address], Matt May MD, 15 mL at 07/20/23 0809  •  heparin (porcine) subcutaneous injection 5,000 Units, 5,000 Units, Subcutaneous, Q8H [address], 5,000 Units at 07/20/23 0601 **AND** [CANCELED] Platelet count, , , Once, Nany Anna DO  •  HYDROmorphone HCl (DILAUDID) injection 0.2 mg, 0.2 mg, Intravenous, Q4H PRN, Ceil Snellen, DO  •  linezolid (ZYVOX) IVPB 300 mg, 300 mg, Intravenous, Q12H, Matt May MD, Last Rate: 150 mL/hr at 07/20/23 0317, 300 mg at 07/20/23 0317  •  melatonin tablet 3 mg, 3 mg, Oral, HS, Trav Stewart PA-C  •  multi-electrolyte (PLASMALYTE-A/ISOLYTE-S PH 7.4) IV solution, 75 mL/hr, Intravenous, Continuous, Trav Stewart PA-C, Last Rate: 75 mL/hr at 07/20/23 0803, 75 mL/hr at 07/20/23 0803  •  oxyCODONE (ROXICODONE) IR tablet 5 mg, 5 mg, Oral, Q4H PRN, Ceil Snellen, DO  •  oxyCODONE (ROXICODONE) split tablet 2.5 mg, 2.5 mg, Oral, Q4H PRN, Ceil Snellen, DO  •  pantoprazole (PROTONIX) EC tablet 40 mg, 40 mg, Oral, Early Morning, Trav Stewart PA-C  •  phenylephrine (SHRAVAN-SYNEPHRINE) 50 mg (STANDARD CONCENTRATION) in sodium chloride 0.9% 250 mL,  mcg/min, Intravenous, Titrated, Heron Tan MD [place], Last Rate: 3 mL/hr at 07/20/23 0330, 10 mcg/min at 07/20/23 0330  •  piperacillin-tazobactam (ZOSYN) 3.375 g in sodium chloride 0.9 % 100 mL IVPB (EXTENDED-INFUSION), 3.375 g, Intravenous, Q8H, Ron Alcala PA-C, Last Rate: 0 mL/hr at 07/20/23 0145, 3.375 g at 07/20/23 0434  •  sodium bicarbonate tablet 1,300 mg, 1,300 mg, Oral, BID after meals, Louise Mills PA-C, 1,300 mg at 07/19/23 2046    OBJECTIVE:    Vitals:    07/20/23 0700 07/20/23 0800 07/20/23 0830 07/20/23 0900   BP: 107/56 103/52 112/60 100/66   BP Location:       Pulse: 82 78 88 80   Resp: (!) 23 21 22 20   Temp:  98 °F (36.7 °C)     TempSrc:  Oral     SpO2: 100% 99% 98% 98%   Weight:       Height:            Temp:  [97.7 °F (36.5 °C)-98.6 °F (37 °C)] 98 °F (36.7 °C)  HR:  [64-92] 80  Resp:  [10-29] 20  BP: ()/(39-69) 100/66  SpO2:  [91 %-100 %] 98 %     Body mass index is 23.44 kg/m². Weight (last 2 days)     Date/Time Weight    07/18/23 0900 54.4 (120)          I/O last 3 completed shifts: In: 1374.5 [P.O.:360; I.V.:534.5; NG/GT:30; IV Piggyback:450]  Out: 1865 [CXOBT:6227]    I/O this shift:  In: 227.1 [I.V.:127.1; IV Piggyback:100]  Out: 200 [Urine:200]      Physical exam:    General: Frail and elderly  Eyes: conjunctivae pink, anicteric sclerae  ENT: lips and mucous membranes moist, no exudates, normal external ears  Neck: ROM intact, no JVD  Chest: No respiratory distress, no accessory muscle use  CVS: normal rate, non pericardial friction rub  Abdomen: soft, non-tender, non-distended, normoactive bowel sounds  Extremities: Positive upper and lower extremity edema  Skin: no rash  Neuro: awake, alert, oriented, grossly intact  Psych:  Pleasant affect    Invasive Devices:   Urethral Catheter Latex 16 Fr.  (Active)   Reasons to continue Urinary Catheter  Accurate I&O assessment in critically ill patients (48 hr. max) 07/20/23 0936   Goal for Removal Voiding trial when ambulation improves 07/20/23 [address] [place] [address] Assessment Clean;Skin intact 07/20/23 0936   Qureshi Care Done 07/20/23 0812   Collection Container Standard drainage bag 07/20/23 0936   Securement Method Securing device (Describe) 07/20/23 0936   Output (mL) 200 mL 07/20/23 0936     Lab Results:   Results from last 7 days   Lab Units 07/20/23  0451 07/19/23  0523 07/18/23  1427 07/18/23  1100 07/18/23  0516 07/18/23  0029 07/17/23  1453 07/17/23  0446 07/16/23  0438 07/15/23  0954 07/14/23  2244 07/14/23  1737 07/14/23  1735   WBC Thousand/uL 35.22* 50.63*  --   --  62.59* 72.36* 74.56*   < > 46.09*  --    < >  --   --    HEMOGLOBIN g/dL 8.1* 9.1*  --   --  9.5* 9.8* 9.5*   < > 9.2*  --    < >  --   --    HEMATOCRIT % 25.3* 27.0*  --   --  29.1* 29.5* 28.4*   < > 26.7*  --    < >  --   --    PLATELETS Thousands/uL 278 379  --   --  407* 415* 420*   < > 313  --    < >  --   --    POTASSIUM mmol/L 4.6 5.4* 5.4*  --  5.6* 5.3 4.9   < > 4.8  --    < >  --   --    CHLORIDE mmol/L 110* 111* 109*  --  106 106 105   < > 104  --    < >  --   --    CO2 mmol/L 22 19* 19*  --  19* 18* 18*   < > 19*  --    < >  --   --    BUN mg/dL 45* 55* 52*  --  49* 50* 51*   < > 45*  --    < >  --   --    CREATININE mg/dL 0.94 1.15 1.17  --  1.21 1.29 1.33*   < > 1.82*  --    < >  --   --    CALCIUM mg/dL 8.5 8.7 8.8  --  9.0 9.0 8.5   < > 8.1*  --    < >  --   --    MAGNESIUM mg/dL 2.1 2.5 2.5  --  2.3 2.2 2.1  --   --   --    < >  --   --    PHOSPHORUS mg/dL  --  4.3* 4.6*  --  5.0* 4.9* 3.7  --  2.6  --    < >  --   --    ALK PHOS U/L  --  226*  --   --  278*  --  352*  --  145*  --   --   --   --    ALT U/L  --  38  --   --  36  --  50  --  24  --   --   --   --    AST U/L  --  50*  --   --  49*  --  80*  --  27  --   --   --   --    BLOOD CULTURE   --   --   --  No Growth at 24 hrs. No Growth at 24 hrs.  --   --   --   --   --   --   --  No Growth After 5 Days. No Growth at 72 hrs.    NITRITE UA   --   --   --   --   --   --   --   --   --  Negative  --   --   --    LEUKOCYTES UA   -- --   --   --   --   --   --   --   --  Moderate*  --   --   --    BLOOD UA   --   --   --   --   --   --   --   --   --  Small*  --   --   --     < > = values in this interval not displayed. ASSESSMENT & PLAN     #1  Acute kidney injury, likely secondary to prerenal azotemia, ATN  • Creatinine is stable around 0.9  • Overall is a positive fluid balance  • Patient's maps have improved and are stable  • pH is acceptable  • History of left nephrectomy for malignancy  • Interval plan: Patient's creatinine has remained stable, GFR overestimated by current serum creatinine also in the ICU but overall stable nonoliguric and medications appropriately dosed     #2  Sepsis  • Secondary to a NSTI UTI  • IV Zosyn and linezolid, infectious disease following  • Washout and debridement per surgical team  • Interval plan: The patient is being followed by surgery plan for repeat debridement     #3  Low bicarbonate, hyperkalemia in the setting of a white count up to 50,000  • This may be pseudohyperkalemia associated with an elevated WBC count  • Using pH neutral solutions, pH 7.3,  • Interval plan: White count is improving, bicarb is stable, potassium is improved tolerating sodium bicarbonate would continue and maintain for a bicarb of 24-26     #4  Acute hypoxic respiratory failure  • Remains intubated in the ICU on critical care service  • Interval plan: The patient was extubated and is currently stable     #5  Hypotension  • On Mirza-Synephrine  • Keep map above 65  • Interval plan: Continue to keep maps above 65     #6  Anemia in the setting of chronic illness  • Monitor H&H and transfuse as needed  • Interval plan: Globin remained stable     #7  Status post fall  - Ongoing supportive care for rib fractures and sacral fractures  - Interval plan: Overall stable, pain is controlled     #8 hypoosmolar hyponatremia  - Interval plan:  This has resolved and her sodium is 140     #10 cholelithiasis without evidence of acute cholecystitis  - Continue to monitor    DISCUSSION:    The patient is stable from a renal standpoint no active issues being managed by nephrology if patient's bicarb is above 26 can discontinue sodium bicarbonate. Otherwise we will see the patient as needed. Please call with any questions or concerns    Portions of the record may have been created with voice recognition software. Occasional wrong word or "sound a like" substitutions may have occurred due to the inherent limitations of voice recognition software. Read the chart carefully and recognize, using context, where substitutions have occurred. If you have any questions, please contact the dictating provider.

## 2023-07-21 NOTE — WOUND OSTOMY CARE
Progress Note- Ostomy  Imelda Mora 80 y.o. female  [id number]  [facility] [phone]        Assessment:  Patient is s/p POD #1 laparoscopic colostomy creation. Patient laying in bed, agreeable to visit, daughter also at bedside. Daughter reports that she lives within 5 minutes of her mom and could assist in colostomy care. Made plan with patient and daughter to do teaching session at 10 on Tuesday 7/25. Teaching kit left with patient and daughter. Specialty bed ordered for patient given large buttocks wound and advanced age. Ostomy Care:  1. Peel back pouch using push-pull method  2. Use warm water only to cleanse skin around the stoma (prince-stomal skin). 3. Make sure all adhesive residue is removed and skin is dry and not oily. 4. Measure stoma size using measuring guide and trace correct measurements onto back of pouch. 5. Then mold the backing of pouch out to correct shape/size. 6. Use 3M no sting barrier film to prep the skin around the stoma. 7. Place pouch over stoma and onto skin. 8. Use warmth of hand to apply gentle pressure to help backing of pouch to adhere well to skin. 9. Empty pouch when 1/3 full. 10. Change pouch every 3-4 days or if signs of leaking. 11. Will continue to follow patient while an inpatient for Ostomy teaching/education.           Stoma:             Shraddha GALVANN, RN, Jewel Mei

## 2023-07-21 NOTE — PLAN OF CARE
Problem: PAIN - ADULT  Goal: Verbalizes/displays adequate comfort level or baseline comfort level  Description: Interventions:  - Encourage patient to monitor pain and request assistance  - Assess pain using appropriate pain scale  - Administer analgesics based on type and severity of pain and evaluate response  - Implement non-pharmacological measures as appropriate and evaluate response  - Consider cultural and social influences on pain and pain management  - Notify physician/advanced practitioner if interventions unsuccessful or patient reports new pain  Outcome: Not Progressing     Problem: INFECTION - ADULT  Goal: Absence or prevention of progression during hospitalization  Description: INTERVENTIONS:  - Assess and monitor for signs and symptoms of infection  - Monitor lab/diagnostic results  - Monitor all insertion sites, i.e. indwelling lines, tubes, and drains  - Monitor endotracheal if appropriate and nasal secretions for changes in amount and color  - [place] appropriate cooling/warming therapies per order  - Administer medications as ordered  - Instruct and encourage patient and family to use good hand hygiene technique  - Identify and instruct in appropriate isolation precautions for identified infection/condition  Outcome: Not Progressing     Problem: DISCHARGE PLANNING  Goal: Discharge to home or other facility with appropriate resources  Description: INTERVENTIONS:  - Identify barriers to discharge w/patient and caregiver  - Arrange for needed discharge resources and transportation as appropriate  - Identify discharge learning needs (meds, wound care, etc.)  - Arrange for interpretive services to assist at discharge as needed  - Refer to Case Management Department for coordinating discharge planning if the patient needs post-hospital services based on physician/advanced practitioner order or complex needs related to functional status, cognitive ability, or social support system  Outcome: Not Progressing     Problem: COPING  Goal: Pt/Family able to verbalize concerns and demonstrate effective coping strategies  Description: INTERVENTIONS:  - Assist patient/family to identify coping skills, available support systems and cultural and spiritual values  - Provide emotional support, including active listening and acknowledgement of concerns of patient and caregivers  - Reduce environmental stimuli, as able  - Provide patient education  - Assess for spiritual pain/suffering and initiate spiritual care, including notification of Pastoral Care or han based community as needed  - Assess effectiveness of coping strategies  Outcome: Not Progressing  Goal: Will report anxiety at manageable levels  Description: INTERVENTIONS:  - Administer medication as ordered  - Teach and encourage coping skills  - Provide emotional support  - Assess patient/family for anxiety and ability to cope  Outcome: Not Progressing

## 2023-07-21 NOTE — QUICK NOTE
General Surgery  Cortez allison female MRN: [id number]    Unit/Bed#: [facility] 203-01 Encounter: [id number]  Dressing change was done at bedside. Patient received medicine for pain shortly before beginning. Crystal Charm was removed from wound. Wound was assessed for bleeding, purulent drainage or necrotic tissue. Tissue looked viable, no need for debridement. (see picture below). Wound was packed with 1+1/2 kurlex wet with sterile irrigation water and a dry abdominal pad placed on top with tape.             Lisa Phillips MD  7/21/2023

## 2023-07-21 NOTE — QUICK NOTE
Post Op Check Note - Surgery Resident  Olivia Gutierrez 80 y.o. female MRN: [id number]  Unit/Bed#: ICU 14 Encounter: [id number]    ASSESSMENT:  Olivia Gutierrez is a 80 y.o. female who is status post debridement wound and loop transverse colostomy laparoscopic. No pressors at this time  Patient extubated in the OR, on 6 L nasal cannula  Left wrist A-line  Qureshi    Subjective: Patient seen and examined at bedside. Patient mildly sedated from anesthesia postoperatively. Physical Exam:  GEN: NAD  CV: Well-perfused regular rate  Lung: No respiratory distress, normal work of breathing on 6 L nasal cannula  Ab: Soft, NT/ND, with loop colostomy with colostomy bar in the left upper quadrant. Ostomy appears pink and healthy. Neuro: Sedated postoperatively  Incisions: Ostomy appears healthy, laparoscopic port sites x2 clean dry and intact with glue. Blood pressure 114/59, pulse 82, temperature (!) 97 °F (36.1 °C), temperature source Axillary, resp. rate 18, height 5' (1.524 m), weight 54.4 kg (120 lb), SpO2 100 %. ,Body mass index is 23.44 kg/m².       Intake/Output Summary (Last 24 hours) at 7/20/2023 2140  Last data filed at 7/20/2023 2136  Gross per 24 hour   Intake 1963.3 ml   Output 1150 ml   Net 813.3 ml       Invasive Devices     Central Venous Catheter Line  Duration           CVC Central Lines 07/17/23 3 days          Arterial Line  Duration           Arterial Line 07/20/23 Right Radial <1 day          Drain  Duration           Urethral Catheter Latex 16 Fr. 5 days    Colostomy Loop LLQ <1 day                VTE Pharmacologic Prophylaxis: Heparin

## 2023-07-21 NOTE — PROGRESS NOTES
Progress Note - General Surgery  Edouard Preston 80 y.o. female MRN: [id number]  Unit/Bed#: [facility] 203-01 Encounter: [id number]      Assessment:  80 y.o. female  with NSTI of the right perineal area involving the right external sphincter. S/p washout and debridement on 7/17  S/p washout and debridement on 7/18  S/p washout, lap colostomy 7/20    AVSS on RA  WBC 31.69 (29.36)  Hgb 7.8 (7.8)  Cr 0.87 (0.84)    Plan:  - may advance diet  - will need ostomy teaching  - bedside dressing change today  - Pain/nausea control prn  - appreciate care per SICU      Subjective:   No acute events overnight. Having some abdominal pain this morning. Objective:   Temp:  [97 °F (36.1 °C)-98.4 °F (36.9 °C)] 97.6 °F (36.4 °C)  HR:  [78-90] 90  Resp:  [17-25] 23  BP: (100-116)/(52-75) 110/61  Arterial Line BP: (118-136)/(48-56) 126/54    Physical Exam:  General: No acute distress  Neuro: alert and oriented  HEENT: moist mucous membranes  CV: Well perfused, regular rate and rhythm  Lungs: Normal work of breathing, no increased respiratory effort  Abdomen: Soft, tender, non-distended. Ostomy with viable stoma, has some serous sweat per bag  Extremities: No edema, clubbing or cyanosis  Skin: Warm, dry          I/O       07/17 0701  07/18 0700 07/18 0701  07/19 0700 07/19 0701  07/20 0700    P. O.       I.V. (mL/kg) 1676.7 (30.8) 901.9 (16.6)     IV Piggyback 103.3 250     Cell Saver  500     Total Intake(mL/kg) 1780 (32.7) 1651.9 (30.4)     Urine (mL/kg/hr) 1550 (1.2) 1220 (0.9)     Stool 0 0     Blood 5      Total Output 1555 1220     Net +225 +431.9            Unmeasured Stool Occurrence 0 x            Lab, Imaging and other studies: I have personally reviewed pertinent reports.   , CBC with diff:   Lab Results   Component Value Date    WBC 31.69 (HH) 07/21/2023    HGB 7.8 (L) 07/21/2023    HCT 23.8 (L) 07/21/2023    MCV 90 07/21/2023     07/21/2023    RBC 2.65 (L) 07/21/2023    MCH 29.4 07/21/2023    MCHC 32.8 07/21/2023 RDW 14.7 07/21/2023    MPV 8.8 (L) 07/21/2023    NRBC 0 07/21/2023   , BMP/CMP:   Lab Results   Component Value Date    SODIUM 143 07/21/2023    K 4.3 07/21/2023     (H) 07/21/2023    CO2 22 07/21/2023    BUN 38 (H) 07/21/2023    CREATININE 0.87 07/21/2023    CALCIUM 8.3 07/21/2023    EGFR 56 07/21/2023           Chano Vicente MD  7/21/2023 6:43 AM

## 2023-07-21 NOTE — PROGRESS NOTES
[address] [facility]  Progress Note: Critical Care  Name: Mortimer Cleveland 80 y.o. female I MRN: [id number]  Unit/Bed#: [facility] 203-01 I Date of Admission: 7/10/2023   Date of Service: 7/21/2023 I Hospital Day: 10    Assessment/Plan   Neuro:   · Analgesia  · Prn tylenol  · Prn oxycodone 2.5/5mg  · Daily CAM-ICU, delirium precautions. Regulate sleep/wake cycle. · Melatonin qhs    CV:   · Septic shock  · Secondary to NSTI. Weaned off zayda, goal MAP > 65. · Hx of HTN  · Hold home cozaar while requiring pressors  · Severe AS, moderate to severe MR, severe MS  · Cardiology consulted, no interventions warranted      Pulm:  · Acute respiratory insufficiency   · Extubated 7/19 to NC. Now on room air  · Encourage IS, pulmonary toilet      GI:   · GERD  · GI ppx with protonix      :   · R perianal NSTI  · S/p OR debridement 7/17, 7/18, 7/20. S/p colostomy 7/21. Trend ostomy output. Appreciate general surgery recommendations. Antibiotics as below. · UTI, POA  · Completed 3 days of rocephin (7/11-7/13) for E. Coli UTI  · Maintain russell in setting of perianal NSTI  · MAXX - resolved  · Trend strict I/Os, daily renal function      F/E/N:   · Isolyte at 75cc/hr, advance diet per surgery recommendations  · Replete electrolytes as needed for goal Mag > 2.0, Phos >3.0, K >4.0  · NPO, advance diet      Heme/Onc:   · DVT ppx  · SQH      Endo:   · No active issues      ID:   · R perianal NSTI  · Zosyn/linezolid. Trend fever curve/white count      MSK/Skin:   · Sacral and L pubic rami fracture  · Non operative management. WBAT per ortho recommendations      Disposition: Stepdown Level 2    ICU Core Measures     A: Assess, Prevent, and Manage Pain · Has pain been assessed? Yes  · Need for changes to pain regimen? No   B: Both SAT/SAT  · N/A   C: Choice of Sedation · RASS Goal: N/A patient not on sedation  · Need for changes to sedation or analgesia regimen?  No   D: Delirium · CAM-ICU: Negative   E: Early Mobility  · Plan for early mobility? Yes   F: Family Engagement · Plan for family engagement today? Yes       Antibiotic Review: Patient on appropriate coverage based on culture data. Review of Invasive Devices: Qureshi Plan: Continue secondary to wounds that would be worsened by incontinence  Central access plan: Will obtain peripheral access and discontinue prior to transfer      Prophylaxis:  VTE VTE covered by:  heparin (porcine), Subcutaneous, 5,000 Units at 07/21/23 2169       Stress Ulcer  covered bypantoprazole (PROTONIX) EC tablet 40 mg [[id number]]          Subjective   HPI/24hr events: OR yesterday for washout, colostomy creation. Weaned off zayda. Review of Systems   Gastrointestinal: Positive for abdominal pain. Objective                            Vitals I/O      Most Recent Min/Max in 24hrs   Temp 97.6 °F (36.4 °C) Temp  Min: 97 °F (36.1 °C)  Max: 98.4 °F (36.9 °C)   Pulse 86 Pulse  Min: 78  Max: 90   Resp (!) 23 Resp  Min: 17  Max: 25   /55 BP  Min: 100/66  Max: 116/60   O2 Sat 97 % SpO2  Min: 85 %  Max: 100 %      Intake/Output Summary (Last 24 hours) at 7/21/2023 0734  Last data filed at 7/21/2023 0601  Gross per 24 hour   Intake 2619 ml   Output 1240 ml   Net 1379 ml         Diet NPO     Invasive Monitoring Physical exam    Physical Exam  Vitals and nursing note reviewed. Constitutional:       General: She is not in acute distress. Appearance: She is not ill-appearing. HENT:      Head: Normocephalic and atraumatic. Eyes:      Pupils: Pupils are equal, round, and reactive to light. Cardiovascular:      Rate and Rhythm: Normal rate and regular rhythm. Heart sounds: Murmur heard. Systolic murmur is present. Pulmonary:      Effort: Pulmonary effort is normal.      Breath sounds: Normal breath sounds. Abdominal:      General: There is no distension. Palpations: Abdomen is soft. Tenderness: There is abdominal tenderness.    Genitourinary: Comments: Qureshi present  Skin:     General: Skin is warm and dry. Neurological:      Mental Status: She is alert and oriented to person, place, and time. Psychiatric:         Behavior: Behavior is cooperative. Diagnostic Studies        Imaging:  I have personally reviewed pertinent reports.        Medications:  Scheduled PRN   carbamide peroxide, 5 drop, BID  chlorhexidine, 15 mL, Q12H NELSY  heparin (porcine), 5,000 Units, Q8H [facility]  linezolid, 300 mg, Q12H  melatonin, 3 mg, HS  pantoprazole, 40 mg, Early Morning  piperacillin-tazobactam, 3.375 g, Q8H  sodium bicarbonate, 1,300 mg, BID after meals      acetaminophen, 650 mg, Q6H PRN  HYDROmorphone, 0.2 mg, Q4H PRN  oxyCODONE, 5 mg, Q4H PRN  oxyCODONE, 2.5 mg, Q4H PRN       Continuous    multi-electrolyte, 75 mL/hr, Last Rate: 75 mL/hr (07/20/23 0803)         Labs:    CBC    Recent Labs     07/20/23 2347 07/21/23  0425   WBC 29.36* 31.69*   HGB 7.8* 7.8*   HCT 23.5* 23.8*    259     BMP    Recent Labs     07/20/23 2347 07/21/23  0430   SODIUM 142 143   K 4.2 4.3   * 113*   CO2 19* 22   AGAP 10 8   BUN 37* 38*   CREATININE 0.84 0.87   CALCIUM 8.2* 8.3       Coags    No recent results     Additional Electrolytes  Recent Labs     07/20/23 2347 07/21/23  0411 07/21/23  0430   MG 2.3  --  2.1   PHOS 3.0  --  2.8   CAIONIZED 1.07* 1.08*  --           Blood Gas    No recent results  Recent Labs     07/20/23  2357   PHVEN 7.311   [id number] 35.4*   PO2VEN 28.5*   [id number] 17.5*   BEVEN -8.0    LFTs  No recent results    Infectious  No recent results  Glucose  Recent Labs     07/20/23  0451 07/20/23  2347 07/21/23  0430   GLUC 119 137 126                   [address] [place], [place]

## 2023-07-21 NOTE — ANESTHESIA POSTPROCEDURE EVALUATION
Post-Op Assessment Note    CV Status:  Stable  Pain Score: 0    Pain management: adequate     Mental Status:  Arousable and sleepy   Hydration Status:  Euvolemic and stable   PONV Controlled:  Controlled   Airway Patency:  Patent and adequate      Post Op Vitals Reviewed: Yes      Staff: CRNA         No notable events documented.     /59 (07/20/23 2135)    Temp (!) 97 °F (36.1 °C) (07/20/23 2135)    Pulse 82 (07/20/23 2135)   Resp 18 (07/20/23 2135)    SpO2 100 % (07/20/23 2135)

## 2023-07-21 NOTE — PROGRESS NOTES
Progress Note - Trauma ICU Transfer   and [facility]   Ira Phillips 80 y.o. female [id number]   Unit/Bed#: [facility] 203-01 Encounter: [id number]     Assessment & Plan   Summary of Diagnosed Injuries:   · Sacral fx  · L pubic rami fx  · Perianal NSTI    PLAN:  · WBAT per ortho recommendations  · Bedside dressing changes per red surgery   · Mechanical soft w/ thins diet  · Continue zosyn x 4 days after source control     VTE Prophylaxis:Enoxaparin (Lovenox)     Disposition: Med surg    Code status:  Level 3 - DNAR and DNI    Consultants: IP CONSULT TO ORTHOPEDIC SURGERY  IP CONSULT TO GERONTOLOGY  IP CONSULT TO NEPHROLOGY  IP CONSULT TO ACUTE CARE SURGERY  IP CONSULT TO CASE MANAGEMENT  IP CONSULT TO CARDIOLOGY     Subjective   Mechanism of Injury:Fall     HPI/Last 24 hour events: Per Marah Ashford "80 y.o. female who presents with fall. Bryce Lightning off toilet onto her buttock, no head strike, no LOC, no thinners. Difficulty ambulating x2 weeks after a mechanical fall 2 weeks ago. Was seen at urgent care with negative pelvis XR and discharged. Pain worse today after fall and unable to ambulate. At rest, patient has no complaints. Also had fever and chills about a week ago, on arrival temp 100.4. Pt reports dysuria and urinary frequency for past few days. In ED, pt had positive UA and started on rocephin."    Reason for ICU admission: Septic shock 2/2 NSTI    Summary of ICU clinical course: Patient arrived to ICU s/p debridement of perianal NSTI intubated and on zayda. She was extubated on 7/19 and remained on pressors through 7/20. She was taken to the OR for debridement 7/17, 7/18, and 7/20 and had transverse colostomy creation on 7/20.      Recent or scheduled procedures:   7/17 debridement/washout  7/18 debridement/washout, dressing change  7/20 debridement/washout, laparoscopic colostomy creation    Outstanding/pending diagnostics: n/a       Objective   Vitals:   Temp:  [97 °F (36.1 °C)-98.4 °F (36.9 °C)] 97.4 °F (36.3 °C)  HR:  [78-90] 86  Resp:  [17-24] 23  BP: (102-116)/(55-75) 104/56  Arterial Line BP: (118-136)/(48-56) 126/54    I/O       07/19 0701  07/20 0700 07/20 0701  07/21 0700 07/21 0701  07/22 0700    P. O. 360  240    I.V. (mL/kg) 299.3 (5.5) 1969 (35.8) 463.8 (8.4)    NG/GT 30      IV Piggyback 450 650 100    Cell Saver       Total Intake(mL/kg) 1139.3 (20.9) 2619 (47.6) 803.8 (14.6)    Urine (mL/kg/hr) 1195 (0.9) 1200 (0.9) 225 (0.8)    Stool 0 20 0    Blood  20     Total Output 1195 1240 225    Net -55.8 +1379 +578.8           Unmeasured Stool Occurrence   1 x           Physical Exam:   GENERAL APPEARANCE: NAD  NEURO: Alert and oriented x 3. Non focal neuro exam  HEENT: NCAT  CV: RRR, systolic murmur  LUNGS: CTA b/l  GI: Soft, mild tenderness to palpation. Colostomy in place with bowel sweat  MSK: No edema. Moves all extremities to command  SKIN: warm and dry    Invasive Devices     Central Venous Catheter Line  Duration           CVC Central Lines 07/17/23 3 days          Peripheral Intravenous Line  Duration           Peripheral IV 07/21/23 Proximal;Right;Ventral (anterior) Forearm <1 day          Drain  Duration           Colostomy Loop LLQ <1 day               Rationale for remaining devices: n/a    1. Before the illness or injury that brought you to the Emergency, did you need someone to help you on a regular basis? 1=Yes   2. Since the illness or injury that brought you to the Emergency, have you needed more help than usual to take care of yourself? 1=Yes   3. Have you been hospitalized for one or more nights during the past 6 months (excluding a stay in the Emergency Department)? 0=No   4. In general, do you see well? 0=Yes   5. In general, do you have serious problems with your memory? 0=No   6. Do you take more than three different medications everyday?  1=Yes   TOTAL   3     Did you order a geriatric consult if the score was 2 or greater?: yes       Lab Results:   BMP/CMP:   Lab Results   Component Value Date    SODIUM 143 07/21/2023    K 4.3 07/21/2023     (H) 07/21/2023    CO2 22 07/21/2023    BUN 38 (H) 07/21/2023    CREATININE 0.87 07/21/2023    CALCIUM 8.3 07/21/2023    EGFR 56 07/21/2023    and CBC:   Lab Results   Component Value Date    WBC 31.69 (HH) 07/21/2023    HGB 7.8 (L) 07/21/2023    HCT 23.8 (L) 07/21/2023    MCV 90 07/21/2023     07/21/2023    RBC 2.65 (L) 07/21/2023    MCH 29.4 07/21/2023    MCHC 32.8 07/21/2023    RDW 14.7 07/21/2023    MPV 8.8 (L) 07/21/2023    NRBC 0 07/21/2023       Imaging Results: I have personally reviewed pertinent reports. Other Studies: n/a    Code Status: Level 3 - DNAR and DNI       Patient seen and evaluated by Critical Care today and deemed to be appropriate for transfer to Med Surg. Spoke to Leonarda Riedel from Trauma service regarding transfer. Critical Care can be contacted via [employer] with any questions or concerns.

## 2023-07-21 NOTE — OP NOTE
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 (Active)   Reasons to continue Urinary Catheter  Accurate I&O assessment in critically ill patients (48 hr. 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 Healthy bleeding wound edges  Wound packed with 3x kerlix tied together. Rectal tube removed. Complications:   None    Procedure and Technique:  Patient was identified in the preoperative holding area wristband and verbally taken the operating room in stable condition. Upon arrival to the operating room she was again identified verbally via wristband, laid supine on the operating table, patient was induced with general anesthesia airway was secured with endotracheal intubation.   Both arms were tucked, pressure points were padded, she was placed in lithotomy position. Attention was drawn just inferior to the umbilicus a generous amount of local anesthesia was injected down to the fascia. 1 cm skin incision was made longitudinally, subcutaneous tissues were dissected bluntly with Adan forceps, umbilical raphae was grasped with [place], and fascia was incised with 11 blade scalpel. 5 mm port was placed into the abdomen and abdomen was insufflated 15 mmHg. Camera was inserted into the abdomen we confirmed there were no enterotomies or mesenteric injuries. Next we proceeded to place a suprapubic 5 mm port and right lower quadrant 5 mm port under direct vision. Patient was placed in Trendelenburg and small bowel was inferiorly, greater omentum was reflected superiorly, and transverse colon was identified. Transverse colon was grasped with bowel grasper and there was significant amount of laxity in the transverse colon, and would be sufficient to create an ostomy just 5 cm left of the umbilicus. Eddye Bracket forcep was placed along the prior premarked skin site, and skin was excised with curved Viveros. Subcutaneous tissues were dissected down to the level of the fascia with Bovie electrocautery. Fascia was scored cruciate fashion, rectus was divided with hemostat, posterior fascia was incised with Bovie electrocautery in cruciate fashion. We confirmed that there were 2 finger breaths patency here and directed the transverse colon through the ostomy orifice with laparoscopic grasper. Camera was replaced into the abdomen we confirmed that the transverse colon was not under tension there was no mesenteric twisting, and abdomen was desufflated all remaining ports were removed. Skin was closed with 4-0 Monocryl and Exofin skin glue was applied. Next we proceeded to mature the ostomy. Ostomy bar was directed through mesenteric window, and secured with 3-0 nylon. Transverse colon was opened along the tinea coli with Bovie electrocautery. Ostomy was matured in standard Boom fashion using 3-0 Vicryl suture. Ostomy was pink viable. Ostomy appliance was applied. We then directed our attention towards the right perineal wound. There was copious amount of necrotic tissue throughout the wound. Wound bed was cleaned with Betadine scrub brush Brillo pad. We Pulsavac irrigated the wound with 2 L of warm saline solution. Next there was a significant amount of necrotic tissue that was excised with heavy curved Viveros's. There was a significant amount of tunneling at the most superior aspect of the wound proximately 15 cm of tunneling which was opened with Bovie electrocautery. At the base of the wound there was a necrotic amount of tissue that was excised with heavy curved Viveros and encountered brisk bleeding which was controlled with multiple stick ties and medium size clips. Once we had adequate hemostasis and all remaining necrotic tissue was excised wound bed measured 40 x 25 x 15 cm in size. Wound was with nice healthy bleeding tissue throughout. Down to the level of fascia. The wound did not involve the rectum proven with digital rectal exam. Rectal tube was removed. Wound was then packed with 3 Kerlix tied together. Dressed with trauma ABDs. At the end of the case sponge and instrument counts were performed all were correct. Patient was then awoken from anesthesia, extubated, taken to PACU in stable condition. Dr Madi Mann was present for the entire procedure. I was present for the entire procedure.     Patient Disposition:  PACU     This procedure was not performed to treat colon cancer through resection      SIGNATURE: Papo Collier MD  DATE: July 20, 2023  TIME: 9:25 PM

## 2023-07-21 NOTE — QUICK NOTE
Post Op Check:    80 y.o. F now POD 0 s/p laparoscopic transverse loop colostomy, wound debridement and washout. The patient denied any nausea, vomiting, chest pain, shortness of breath, fevers, chills. Currently on 6L NC saturating greater than 92%. No function through ostomy at this point in time. Adequate UOP in russell, appears to be normal yellow color. A-line in place. Patient with no complaints at this point in time. Vitals:    07/20/23 2135   BP: 114/59   Pulse: 82   Resp: 18   Temp: (!) 97 °F (36.1 °C)   SpO2: 100%       General: NAD  HENT: NCAT MMM  Neck: supple, no JVD  CV: nl rate  Lungs: NL wob. No resp distress. On 6L NC breathing comfortably. ABD: Soft, appropriately tender to palpation, nondistended. Ostomy appears to be pink and viable. Incisions appear to be clean/dry/intact.    Extrem: No CCE  Neuro: AAOx3     Plan:  Diet NPO  Continue isolyte @ 75 cc/hr  Maintain A-line, CVC, russell  Strictly monitor I and O's, monitor ostomy output  Continue to wean oxygen as tolerated  F/u post op labs cbc, bmp, mag, phos, ca, vbg  No post-op imaging  Will discuss with red surgery next washout and debridement  Pain and nausea control PRN    Celso Pappas MD  General Surgery Resident

## 2023-07-21 NOTE — PLAN OF CARE
Problem: PAIN - ADULT  Goal: Verbalizes/displays adequate comfort level or baseline comfort level  Description: Interventions:  - Encourage patient to monitor pain and request assistance  - Assess pain using appropriate pain scale  - Administer analgesics based on type and severity of pain and evaluate response  - Implement non-pharmacological measures as appropriate and evaluate response  - Consider cultural and social influences on pain and pain management  - Notify physician/advanced practitioner if interventions unsuccessful or patient reports new pain  Outcome: Progressing     Problem: INFECTION - ADULT  Goal: Absence or prevention of progression during hospitalization  Description: INTERVENTIONS:  - Assess and monitor for signs and symptoms of infection  - Monitor lab/diagnostic results  - Monitor all insertion sites, i.e. indwelling lines, tubes, and drains  - Monitor endotracheal if appropriate and nasal secretions for changes in amount and color  - [place] appropriate cooling/warming therapies per order  - Administer medications as ordered  - Instruct and encourage patient and family to use good hand hygiene technique  - Identify and instruct in appropriate isolation precautions for identified infection/condition  Outcome: Progressing     Problem: DISCHARGE PLANNING  Goal: Discharge to home or other facility with appropriate resources  Description: INTERVENTIONS:  - Identify barriers to discharge w/patient and caregiver  - Arrange for needed discharge resources and transportation as appropriate  - Identify discharge learning needs (meds, wound care, etc.)  - Arrange for interpretive services to assist at discharge as needed  - Refer to Case Management Department for coordinating discharge planning if the patient needs post-hospital services based on physician/advanced practitioner order or complex needs related to functional status, cognitive ability, or social support system  Outcome: Progressing Problem: Knowledge Deficit  Goal: Patient/family/caregiver demonstrates understanding of disease process, treatment plan, medications, and discharge instructions  Description: Complete learning assessment and assess knowledge base. Interventions:  - Provide teaching at level of understanding  - Provide teaching via preferred learning methods  Outcome: Progressing     Problem: Prexisting or High Potential for Compromised Skin Integrity  Goal: Skin integrity is maintained or improved  Description: INTERVENTIONS:  - Identify patients at risk for skin breakdown  - Assess and monitor skin integrity  - Assess and monitor nutrition and hydration status  - Monitor labs   - Assess for incontinence   - Turn and reposition patient  - Assist with mobility/ambulation  - Relieve pressure over bony prominences  - Avoid friction and shearing  - Provide appropriate hygiene as needed including keeping skin clean and dry  - Evaluate need for skin moisturizer/barrier cream  - Collaborate with interdisciplinary team   - Patient/family teaching  - Consider wound care consult   Outcome: Progressing     Problem: Nutrition/Hydration-ADULT  Goal: Nutrient/Hydration intake appropriate for improving, restoring or maintaining nutritional needs  Description: Monitor and assess patient's nutrition/hydration status for malnutrition. Collaborate with interdisciplinary team and initiate plan and interventions as ordered. Monitor patient's weight and dietary intake as ordered or per policy. Utilize nutrition screening tool and intervene as necessary. Determine patient's food preferences and provide high-protein, high-caloric foods as appropriate.      INTERVENTIONS:  - Monitor oral intake, urinary output, labs, and treatment plans  - Assess nutrition and hydration status and recommend course of action  - Evaluate amount of meals eaten  - Assist patient with eating if necessary   - Allow adequate time for meals  - Recommend/ encourage appropriate diets, oral nutritional supplements, and vitamin/mineral supplements  - Order, calculate, and assess calorie counts as needed  - Recommend, monitor, and adjust tube feedings and TPN/PPN based on assessed needs  - Assess need for intravenous fluids  - Provide specific nutrition/hydration education as appropriate  - Include patient/family/caregiver in decisions related to nutrition  Outcome: Progressing     Problem: COPING  Goal: Pt/Family able to verbalize concerns and demonstrate effective coping strategies  Description: INTERVENTIONS:  - Assist patient/family to identify coping skills, available support systems and cultural and spiritual values  - Provide emotional support, including active listening and acknowledgement of concerns of patient and caregivers  - Reduce environmental stimuli, as able  - Provide patient education  - Assess for spiritual pain/suffering and initiate spiritual care, including notification of Pastoral Care or han based community as needed  - Assess effectiveness of coping strategies  Outcome: Progressing     Problem: COPING  Goal: Will report anxiety at manageable levels  Description: INTERVENTIONS:  - Administer medication as ordered  - Teach and encourage coping skills  - Provide emotional support  - Assess patient/family for anxiety and ability to cope  Outcome: Progressing     Problem: NEUROSENSORY - ADULT  Goal: Achieves stable or improved neurological status  Description: INTERVENTIONS  - Monitor and report changes in neurological status  - Monitor vital signs such as temperature, blood pressure, glucose, and any other labs ordered   - Initiate measures to prevent increased intracranial pressure  - Monitor for seizure activity and implement precautions if appropriate      Outcome: Progressing     Problem: CARDIOVASCULAR - ADULT  Goal: Maintains optimal cardiac output and hemodynamic stability  Description: INTERVENTIONS:  - Monitor I/O, vital signs and rhythm  - Monitor for S/S and trends of decreased cardiac output  - Administer and titrate ordered vasoactive medications to optimize hemodynamic stability  - Assess quality of pulses, skin color and temperature  - Assess for signs of decreased coronary artery perfusion  - Instruct patient to report change in severity of symptoms  Outcome: Progressing     Problem: CARDIOVASCULAR - ADULT  Goal: Absence of cardiac dysrhythmias or at baseline rhythm  Description: INTERVENTIONS:  - Continuous cardiac monitoring, vital signs, obtain 12 lead EKG if ordered  - Administer antiarrhythmic and heart rate control medications as ordered  - Monitor electrolytes and administer replacement therapy as ordered  Outcome: Progressing     Problem: RESPIRATORY - ADULT  Goal: Achieves optimal ventilation and oxygenation  Description: INTERVENTIONS:  - Assess for changes in respiratory status  - Assess for changes in mentation and behavior  - Position to facilitate oxygenation and minimize respiratory effort  - Oxygen administered by appropriate delivery if ordered  - Initiate smoking cessation education as indicated  - Encourage broncho-pulmonary hygiene including cough, deep breathe, Incentive Spirometry  - Assess the need for suctioning and aspirate as needed  - Assess and instruct to report SOB or any respiratory difficulty  - Respiratory Therapy support as indicated  Outcome: Progressing     Problem: GASTROINTESTINAL - ADULT  Goal: Maintains or returns to baseline bowel function  Description: INTERVENTIONS:  - Assess bowel function  - Encourage oral fluids to ensure adequate hydration  - Administer IV fluids if ordered to ensure adequate hydration  - Administer ordered medications as needed  - Encourage mobilization and activity  - Consider nutritional services referral to assist patient with adequate nutrition and appropriate food choices  Outcome: Progressing     Problem: GASTROINTESTINAL - ADULT  Goal: Maintains adequate nutritional intake  Description: INTERVENTIONS:  - Monitor percentage of each meal consumed  - Identify factors contributing to decreased intake, treat as appropriate  - Assist with meals as needed  - Monitor I&O, weight, and lab values if indicated  - Obtain nutrition services referral as needed  Outcome: Progressing     Problem: GENITOURINARY - ADULT  Goal: Maintains or returns to baseline urinary function  Description: INTERVENTIONS:  - Assess urinary function  - Encourage oral fluids to ensure adequate hydration if ordered  - Administer IV fluids as ordered to ensure adequate hydration  - Administer ordered medications as needed  - Offer frequent toileting  - Follow urinary retention protocol if ordered  Outcome: Progressing     Problem: GENITOURINARY - ADULT  Goal: Absence of urinary retention  Description: INTERVENTIONS:  - Assess patient’s ability to void and empty bladder  - Monitor I/O  - Bladder scan as needed  - Discuss with physician/AP medications to alleviate retention as needed  - Discuss catheterization for long term situations as appropriate  Outcome: Progressing     Problem: METABOLIC, FLUID AND ELECTROLYTES - ADULT  Goal: Electrolytes maintained within normal limits  Description: INTERVENTIONS:  - Monitor labs and assess patient for signs and symptoms of electrolyte imbalances  - Administer electrolyte replacement as ordered  - Monitor response to electrolyte replacements, including repeat lab results as appropriate  - Instruct patient on fluid and nutrition as appropriate  Outcome: Progressing     Problem: METABOLIC, FLUID AND ELECTROLYTES - ADULT  Goal: Fluid balance maintained  Description: INTERVENTIONS:  - Monitor labs   - Monitor I/O and WT  - Instruct patient on fluid and nutrition as appropriate  - Assess for signs & symptoms of volume excess or deficit  Outcome: Progressing     Problem: METABOLIC, FLUID AND ELECTROLYTES - ADULT  Goal: Glucose maintained within target range  Description: INTERVENTIONS:  - Monitor Blood Glucose as ordered  - Assess for signs and symptoms of hyperglycemia and hypoglycemia  - Administer ordered medications to maintain glucose within target range  - Assess nutritional intake and initiate nutrition service referral as needed  Outcome: Progressing        Problem: SKIN/TISSUE INTEGRITY - ADULT  Goal: Incision(s), wounds(s) or drain site(s) healing without S/S of infection  Description: INTERVENTIONS  - Assess and document dressing, incision, wound bed, drain sites and surrounding tissue  - Provide patient and family education  - Perform skin care/dressing changes by red surgery   Problem: HEMATOLOGIC - ADULT  Goal: Maintains hematologic stability  Description: INTERVENTIONS  - Assess for signs and symptoms of bleeding or hemorrhage  - Monitor labs  - Administer supportive blood products/factors as ordered and appropriate  Outcome: Progressing     Outcome: Progressing

## 2023-07-21 NOTE — CASE MANAGEMENT
Case Management Discharge Planning Note    Patient name Kedar Pitt  [id number] [id number]  : 3/12/1928 Date 2023       Current Admission Date: 7/10/2023  Current Admission Diagnosis:Generalized weakness   Patient Active Problem List    Diagnosis Date Noted   • Ventilator dependence ([address]) 2023   • Perianal abscess 2023   • Acute encephalopathy 2023   • MAXX (acute kidney injury) ([address]) 07/15/2023   • Sepsis with acute organ dysfunction ([address]) 2023   • Cholelithiasis 2023   • History of rib fracture 2023   • Bilateral pleural effusion 2023   • Bilateral fracture of pubic rami, sequela 2023   • Fall 2023   • Generalized weakness 2023   • Acute UTI (urinary tract infection) 2023   • Closed nondisplaced zone II fracture of sacrum ([address]) 2023   • Hyponatremia 2023   • Anemia 2023   • H/O left nephrectomy 2023   • Raynaud phenomenon 2023   • Arthritis of carpometacarpal (CMC) joint of right thumb 2022   • Primary osteoarthritis of right wrist 2022   • Closed fracture of right proximal humerus 11/10/2022   • Nonrheumatic aortic valve stenosis 2017   • Primary hypertension 2014   • Left ventricular hypertrophy 2014      LOS (days): 10  Geometric Mean LOS (GMLOS) (days): 9.60  Days to GMLOS:-0.2     OBJECTIVE:  Risk of Unplanned Readmission Score: 18.11         Current admission status: Inpatient   Preferred Pharmacy:   [facility] [address] [facility], [address] [place] [id number]  Phone: [phone] Fax: [phone]    Primary Care Provider: Pancho Soares MD    Primary Insurance: [facility]  Secondary Insurance:     DISCHARGE DETAILS:    Pt in [address] yesterday for washout and new ostomy. CM will continue to follow. Noted the pt isn't stable for d/c. Will appreciate therapy recommendations.

## 2023-07-22 PROBLEM — M79.89 NECROTIZING SOFT TISSUE INFECTION: Status: ACTIVE | Noted: 2023-01-01

## 2023-07-22 NOTE — ASSESSMENT & PLAN NOTE
- UTI, present on presentation.  - Completed Rocephin IV x 3 days  - Continue to monitor hemodynamic status   - Urine culture positive for E Coli  - Outpatient follow-up with PCP.  - 7/15 repeat UA with some evidence of leukocytes  - Voiding spontaneously

## 2023-07-22 NOTE — ASSESSMENT & PLAN NOTE
- Diuresis per nephrology earlier this admission  - currently breathing comfortably on room air  - last CXR from 7/17 shows stable pleural effusions

## 2023-07-22 NOTE — SPEECH THERAPY NOTE
Speech Language/Pathology    Speech/Language Pathology Progress Note    Patient Name: Bola Miller  LGITP'F Date: 7/22/2023     Problem List  Principal Problem:    Generalized weakness  Active Problems:    Acute UTI (urinary tract infection)    Closed nondisplaced zone II fracture of sacrum (HCC)    Hyponatremia    Anemia    Primary hypertension    Raynaud phenomenon    Fall    Bilateral fracture of pubic rami, sequela    Sepsis with acute organ dysfunction (HCC)    Cholelithiasis    History of rib fracture    Bilateral pleural effusion    MAXX (acute kidney injury) ([address])    Perianal abscess    Acute encephalopathy    Ventilator dependence ([address])       Past Medical History  Past Medical History:   Diagnosis Date   • Cancer ([address])     Cervical, Kidney, melanoma Per MRI Screening form 2/5/19   • H/O left nephrectomy 7/11/2023        Past Surgical History  Past Surgical History:   Procedure Laterality Date   • NEPHRECTOMY Left    • ID LAPAROSCOPY SURG COLOSTOMY/SKN LVL CECOSTOMY N/A 7/20/2023    Procedure: COLOSTOMY LAPAROSCOPIC;  Surgeon: Ignacio Cuevas DO;  Location: [facility];  Service: Trauma   • WOUND DEBRIDEMENT N/A 7/17/2023    Procedure: DEBRIDEMENT WOUND [facility] OUT); Surgeon: Joaquin De Leon MD;  Location: [facility];  Service: Trauma   • WOUND DEBRIDEMENT N/A 7/18/2023    Procedure: DEBRIDEMENT WOUND AND DRESSING CHANGE [facility]); Surgeon: Joaquin De Leon MD;  Location: [facility];  Service: General   • WOUND DEBRIDEMENT Bilateral 7/20/2023    Procedure: DEBRIDEMENT WOUND [facility] OUT); Surgeon: Ignacio Cuevas DO;  Location: [facility];  Service: Trauma         Subjective:  Pt awake, alert. Head is upward at the ceiling. Current Diet: level 2 dysphagia w/ thin   Objective:  Swallow re assessment requested- pt w/ increased coughing w/ sips of thin with family today. Her intake also remains poor.     CXR IMPRESSION:   1. Persistent retrocardiac opacity favored to represent pleural effusion/atelectasis versus infiltrate.   2. Interval placement of right IJ central line with its tip in the lower SVC. .   3. Unchanged appearance of enteric tube within the distal esophagus with kinking. Advise repositioning.   4. Mild pulmonary vascular congestion. Pt seen w/ trials of puree, soft solids, thin and nectar thick liquids by cup/straw. Reduced retrieval and opening for the tsp - pt grimacing declining trials but eventually willing to take some. Slow manipulation and transfers w/ puree. Prolonged mastication w/the solids w/ mult swallows to clear. Impulsive straw sips with the thin, fills the oral cavity. ? able spill prior to swallows and difficulty managing the amount. ? able piecemeal transfers. Mild throat clear but no overt coughing w/ any trials. Functional draw from straw for nectar, mult swallows but no coughing. Pt has difficulty keeping head in a neutral position, repositioned mult times. Assessment:  Pt w/ decreased oral intake, at least mild/mod oropharyngeal dysphagia with poor manipulation and coordination of transfers of all material.  Her head position is also not optimal for intake and she needs frequent repositioning.      Plan/Recommendations:  Continue the level 2 w/ thin for now-control sip amounts, only offer one sip at a time  Position the pt upright w/ head in neutral  Oral care often

## 2023-07-22 NOTE — PROGRESS NOTES
[facility]  Progress Note  Name: Liza Palacio  MRN: [id number]  Unit/Bed#: [facility] 803-01 I Date of Admission: 7/10/2023   Date of Service: 7/22/2023 I Hospital Day: 11    Assessment/Plan   Fall  Assessment & Plan  - Status post fall with the below noted injuries. - Fall precautions. - Geriatric Medicine consultation for evaluation, medication review and recommendations.  - PT and OT evaluation and treatment as indicated. - Case Management consultation for disposition planning. Bilateral fracture of pubic rami, sequela  Assessment & Plan  - XR results indicate possible acute left pubic rami fracture in addition to a right sided pubic rami fracture  - appreciate orthopedics consult and recommendations  - continue WBAT  - PT and OT    Closed nondisplaced zone II fracture of sacrum [facility])  Assessment & Plan  - Sacral fracture, present on admission.  - Appreciate Orthopedic surgery evaluation and recommendations.   - Non-operative management  - May remain weightbearing as tolerated on the bilateral lower extremities. - Patient reports continued pain in her left groin that affects her gait related to a recent muscular injury.  - Monitor neurovascular exam.  - Continue multimodal analgesic regimen.  - Continue DVT prophylaxis. - PT and OT evaluation and treatment as indicated. - Outpatient follow up with Orthopedic surgery for re-evaluation. Necrotizing soft tissue infection  Assessment & Plan  - 7/15 nursing identified an area of purulent drainage   - Patient was found to have a perianal abscess, purulent drainage expressed. F/u CT imaging showed free air in the soft tissues of the perirectal area. - OR on 7/17, 7/18, and 7/20 for wound debridement and washout of necrotizing soft tissue infection, with diverting ostomy creation on 7/20. - Appreciate General surgery consult and recommendations  - Bedside washout and debridement, packed. Daily packing changes.    - OR on 7/23 planned for repeat washout and debridement due to up-trending leukcoytosis  - Zosyn d/c'd and patient started on Unasyn per ID recommendations on 7/22. - Monitor fever/ WBC curve. Leukocytosis noted to be up-trending. Will evaluate with further debridement in OR tomorrow. Sepsis with acute organ dysfunction [facility])  Assessment & Plan  - Secondary to necrotizing soft tissue infection. Source control with debridement and washout on 7/17, 7/18 and 7/20.   - IV zosyn switched to IV Unasyn per ID recommendations on 7/22  - patient will return to the OR on 7/23 for repeat washout/debridement with surgery due to up-trending WBC count.   -endpoints have cleared otherwise and patient remains afebrile  - continue to monitor fever curve and leukocytosis    Acute UTI (urinary tract infection)  Assessment & Plan  - UTI, present on presentation.  - Completed Rocephin IV x 3 days  - Continue to monitor hemodynamic status   - Urine culture positive for E Coli  - Outpatient follow-up with PCP.  - 7/15 repeat UA with some evidence of leukocytes  - Voiding spontaneously    * Generalized weakness  Assessment & Plan  - History of generalized weakness.   - Patient reports some increased weakness in her left lower extremity with ambulation due to left groin pain/injury that occurred approximately 2 weeks ago. - Maintain fall precautions. - Continue PT and OT evaluation and treatment indicated. - Outpatient follow-up with PCP recommended. Acute encephalopathy  Assessment & Plan  - Patient developed acute encephalopathy during admission secondary to sepsis from necrotizing soft tissue infection  - Baseline GCS 15, fully oriented and lives alone at baseline.   - mental status much improved at this time  - Continue supportive treatment      MAXX (acute kidney injury) ([address])  Assessment & Plan  - Cr elevated to 1.82 s/p hypotension from sepsis.  Now resolved, Cr at baseline.   - Pt has been experiencing urinary retention and failed the protocol. 7/15 russell catheter placement. Now voiding spontaneously without retention.   - Appreciate Nephrology consult and recommendations  - History of left nephrectomy  - Avoid further nephrotoxins      Bilateral pleural effusion  Assessment & Plan  - Diuresis per nephrology earlier this admission  - currently breathing comfortably on room air  - last CXR from 7/17 shows stable pleural effusions    History of rib fracture  Assessment & Plan  - Pt reports history of multiple rib fractures  - Nontender bilateral, circumferential chest walls. No skin changes  - 7/14 CT Chest: "2. Acute, nondisplaced left anterior second through fourth rib fractures. Old healed right posterolateral eighth and ninth rib fractures. "  - Pt denies chest pain  - Encourage IS frequently  - Continue to monitor    Cholelithiasis  Assessment & Plan  - Cholelithiasis with mild wall thickening in the fundus on initial imaging  - Nontender and nondistended abdomen, tolerating a diet  - RUQ US completed due to leukocytosis without evidence of acute cholecystitis  - Repeat CT abd/pelvis completed as well with re-identification of gallstones  - 7/15 Due to severe elevation of WBC, initiated Rocephin and Flagyl to treat possible acute cholecystitis. Appreciate General Surgery consult and recommendations. This therapy was discontinued due to identification of a perianal abscess which is more likely to be causing sepsis  - 7/16 HIDA scan negative  - Low suspicion for acute cholecystitis at this time. Primary hypertension  Assessment & Plan  - Patient with chronic history of hypertension.  - Holding losartan and other antihypertensives due to recent sepsis. - Continue current medication regimen and adjust as indicated. - Outpatient follow-up routine.     Anemia  Assessment & Plan  - secondary to injury and post op blood loss  - asymptomatic  - Hgb stable at 8.7  - no indication for transfusion    Hyponatremia  Assessment & Plan  - Patient with acute hyponatremia, present on presentation.  - Retrospectively, this was secondary to necrotizing soft tissue infection, now resolved. Bowel Regimen: ostomy is functioning  VTE Prophylaxis:Sequential compression device (Venodyne)  and Enoxaparin (Lovenox)     Disposition: continue med-surg status, daily dressing changes of wound packing; OR tomorrow, 7/23 for wound washout and debridement    Subjective   Chief Complaint: "I'm doing okay"    Subjective: Patient reports she is feeling well overall. Denies significant pain or SOB. She tolerated packing change of her wound yesterday. Denies significant abdominal pain. Objective   Vitals:   Temp:  [97.5 °F (36.4 °C)-98.5 °F (36.9 °C)] 98.5 °F (36.9 °C)  HR:  [90-91] 91  Resp:  [16-20] 16  BP: (104-130)/(60-79) 121/73    I/O       07/20 0701  07/21 0700 07/21 0701  07/22 0700 07/22 0701  07/23 0700    P. O.  540 120    I.V. (mL/kg) 1969 (35.8) 563.8 (10.2)     NG/GT       IV Piggyback 650 200     Total Intake(mL/kg) 2619 (47.6) 1303.8 (23.6) 120 (2.2)    Urine (mL/kg/hr) 1200 (0.9) 575 (0.4) 150 (0.3)    Stool 20 0 150    Blood 20      Total Output 1240 575 300    Net +1379 +728.8 -180           Unmeasured Urine Occurrence   1 x    Unmeasured Stool Occurrence  1 x 1 x           Physical Exam:   GENERAL APPEARANCE: NAD  NEURO: GCS 15,non-focal  HEENT: NCAT  CV: RRR, no MGR  LUNGS: CTA bilaterally  GI: soft,non-tender, + ostomy functioning with small amount of stool in the bag  : voiding  MSK: + wound of the buttocks is C/D/I with dressing in place, NVI distally, no significant edema  SKIN: pink, warm, dry    Invasive Devices     Peripheral Intravenous Line  Duration           Peripheral IV 07/21/23 Proximal;Right;Ventral (anterior) Forearm 1 day          Drain  Duration           Colostomy Loop LLQ 1 day    External Urinary Catheter 1 day                      Lab Results:   Results: I have personally reviewed all pertinent laboratory/tests results, BMP/CMP:   Lab Results   Component Value Date    SODIUM 142 07/22/2023    K 4.1 07/22/2023     (H) 07/22/2023    CO2 23 07/22/2023    BUN 35 (H) 07/22/2023    CREATININE 0.88 07/22/2023    CALCIUM 8.6 07/22/2023    EGFR 56 07/22/2023    and CBC:   Lab Results   Component Value Date    WBC 33.31 (HH) 07/22/2023    HGB 8.7 (L) 07/22/2023    HCT 26.2 (L) 07/22/2023    MCV 89 07/22/2023     07/22/2023    RBC 2.93 (L) 07/22/2023    MCH 29.7 07/22/2023    MCHC 33.2 07/22/2023    RDW 14.7 07/22/2023    MPV 8.8 (L) 07/22/2023     Imaging: I have personally reviewed pertinent reports.      Other Studies: no new

## 2023-07-22 NOTE — QUICK NOTE
General Surgery  Progress Note   Kedar Pitt 80 y.o. female MRN: [id number]  Unit/Bed#: [facility] 803-01 Encounter: [id number]     Nurse reported dressing and packing was soiled with stool. Dressing change was done at bedside. Patient received medicine for pain shortly before beginning. Eleonore Forge was removed from wound. Wound was assessed for bleeding, purulent drainage or necrotic tissue. Tissue looked viable, no need for debridement. Wound was packed with 1+1/2 kurlex (4 pieces tied together) wet with sterile irrigation water and a dry abdominal pad placed on top with tape.       Gretchen Dye MD  7/22/2023  2:17 AM

## 2023-07-22 NOTE — ASSESSMENT & PLAN NOTE
- Cr elevated to 1.82 s/p hypotension from sepsis. Now resolved, Cr at baseline.   - Pt has been experiencing urinary retention and failed the protocol. 7/15 russell catheter placement.  Now voiding spontaneously without retention.   - Appreciate Nephrology consult and recommendations  - History of left nephrectomy  - Avoid further nephrotoxins

## 2023-07-22 NOTE — ASSESSMENT & PLAN NOTE
- Patient developed acute encephalopathy during admission secondary to sepsis from necrotizing soft tissue infection  - Baseline GCS 15, fully oriented and lives alone at baseline.   - mental status much improved at this time  - Continue supportive treatment

## 2023-07-22 NOTE — ASSESSMENT & PLAN NOTE
- Patient with acute hyponatremia, present on presentation.  - Retrospectively, this was secondary to necrotizing soft tissue infection, now resolved.

## 2023-07-22 NOTE — ASSESSMENT & PLAN NOTE
- secondary to injury and post op blood loss  - asymptomatic  - Hgb stable at 8.7  - no indication for transfusion

## 2023-07-22 NOTE — ASSESSMENT & PLAN NOTE
- 7/15 nursing identified an area of purulent drainage   - Patient was found to have a perianal abscess, purulent drainage expressed. F/u CT imaging showed free air in the soft tissues of the perirectal area. - OR on 7/17, 7/18, and 7/20 for wound debridement and washout of necrotizing soft tissue infection, with diverting ostomy creation on 7/20. - Appreciate General surgery consult and recommendations  - Bedside washout and debridement, packed. Daily packing changes.   - OR on 7/23 planned for repeat washout and debridement due to up-trending leukcoytosis  - Zosyn d/c'd and patient started on Unasyn per ID recommendations on 7/22. - Monitor fever/ WBC curve. Leukocytosis noted to be up-trending. Will evaluate with further debridement in OR tomorrow.

## 2023-07-22 NOTE — ASSESSMENT & PLAN NOTE
- Patient with chronic history of hypertension.  - Holding losartan and other antihypertensives due to recent sepsis. - Continue current medication regimen and adjust as indicated. - Outpatient follow-up routine.

## 2023-07-22 NOTE — ASSESSMENT & PLAN NOTE
- Secondary to necrotizing soft tissue infection.  Source control with debridement and washout on 7/17, 7/18 and 7/20.   - IV zosyn switched to IV Unasyn per ID recommendations on 7/22  - patient will return to the OR on 7/23 for repeat washout/debridement with surgery due to up-trending WBC count.   -endpoints have cleared otherwise and patient remains afebrile  - continue to monitor fever curve and leukocytosis

## 2023-07-22 NOTE — ASSESSMENT & PLAN NOTE
- XR results indicate possible acute left pubic rami fracture in addition to a right sided pubic rami fracture  - appreciate orthopedics consult and recommendations  - continue WBAT  - PT and OT no

## 2023-07-22 NOTE — PROGRESS NOTES
Progress Note - General Surgery   Cindy Morel 80 y.o. female MRN: [id number]  Unit/Bed#: [facility] 803-01 Encounter: [id number]    Assessment:  81 y/o female with NSTI or right perineal area involving the external sphincter    S/p washout and debridement 7/17  S/p washout and debridement on 7/18  S/p washout, lap colostomy 7/20    No acute events, vitals stable overnight. Transferred out to floor. Dressing soiled with stool, re-packed overnight. Voiding with purewick in place since russell removal.     Plan:  Dysphagia 2 diet  Ostomy teaching  Bedside dressing change today & PRN  Pain/nausea control PRN  PT/OT/OOB  Trend fever curve, uptrending leukocytosis continue to monitor  Sleep hygiene, delirium precautions    Subjective/Objective     Subjective: Pt pleasantly confused, in no acute distress. Told nursing that she was at the store earlier, tells me she is in the basement now. Able to be re-oriented. Objective:     Blood pressure 104/60, pulse 90, temperature 97.5 °F (36.4 °C), resp. rate 20, height 5' (1.524 m), weight 55 kg (121 lb 4.1 oz), SpO2 93 %. ,Body mass index is 23.68 kg/m².       Intake/Output Summary (Last 24 hours) at 7/22/2023 0501  Last data filed at 7/21/2023 2157  Gross per 24 hour   Intake 1555 ml   Output 725 ml   Net 830 ml       Invasive Devices     Peripheral Intravenous Line  Duration           Peripheral IV 07/21/23 Proximal;Right;Ventral (anterior) Forearm <1 day          Drain  Duration           Colostomy Loop LLQ 1 day    External Urinary Catheter <1 day                Physical Exam:   General: Awake, resting in bed at time of exam  Neuro: Oriented to self, year, states location as a basement, knows it is summer but cannot state the month  CV:  Heart rate regular, 2+ peripheral pulses3  Pulm: Lungs CTA, respirations regular  GI: Abd soft, non-tender, ostomy pink  Skin: Groin/Buttock dressings in place, clean and dry    Lab, Imaging and other studies:  I have personally reviewed pertinent lab results.   , CBC:   Lab Results   Component Value Date    WBC 33.31 (HH) 07/22/2023    HGB 8.7 (L) 07/22/2023    HCT 26.2 (L) 07/22/2023    MCV 89 07/22/2023     07/22/2023    RBC 2.93 (L) 07/22/2023    MCH 29.7 07/22/2023    MCHC 33.2 07/22/2023    RDW 14.7 07/22/2023    MPV 8.8 (L) 07/22/2023   , CMP: No results found for: "SODIUM", "K", "CL", "CO2", "ANIONGAP", "BUN", "CREATININE", "GLUCOSE", "CALCIUM", "AST", "ALT", "ALKPHOS", "PROT", "BILITOT", "EGFR"  VTE Pharmacologic Prophylaxis: Enoxaparin (Lovenox)  VTE Mechanical Prophylaxis: sequential compression device

## 2023-07-22 NOTE — ASSESSMENT & PLAN NOTE
- Cholelithiasis with mild wall thickening in the fundus on initial imaging  - Nontender and nondistended abdomen, tolerating a diet  - RUQ US completed due to leukocytosis without evidence of acute cholecystitis  - Repeat CT abd/pelvis completed as well with re-identification of gallstones  - 7/15 Due to severe elevation of WBC, initiated Rocephin and Flagyl to treat possible acute cholecystitis. Appreciate General Surgery consult and recommendations. This therapy was discontinued due to identification of a perianal abscess which is more likely to be causing sepsis  - 7/16 HIDA scan negative  - Low suspicion for acute cholecystitis at this time.

## 2023-07-22 NOTE — CONSULTS
Consultation - Infectious Disease   Mortimer Bradford 80 y.o. female MRN: [id number]  [facility] Encounter: [id number]      IMPRESSION & RECOMMENDATIONS:   1. Perianal necrotizing soft tissue infection. Status post I&D x3 on 7/17/2023, 7/18/2023, and 7/20/2023. Wound cultures did not reveal any resistant organisms and the patient appears to have been colonized with a pansensitive E. coli in the urine. The patient initially had septic shock and was on linezolid to control toxin production however this has been discontinued as the patient is now off all vasopressor support. Unclear significance of the small bump in the WBC count. The patient's undergone colostomy diversion to protect the wound  -Discontinue Zosyn  -Begin Unasyn 3 g IV every 6 hours  -Local wound care  -Recheck CBC with differential and BMP  -Close surgical follow-up    2. Leukocytosis. Likely all related to the necrotizing soft tissue infection. No other clear source appreciated. Consideration for the possibility of atelectasis or developing respiratory infection.  -Antibiotics as above for now  -Pulmonary toilet  -If WBC count would continue to rise may need additional imaging of the chest  -Follow-up blood cultures    3. Cirrhosis. Based upon the CT scan findings. Unclear etiology. Liver function test modestly elevated. Certainly increase the risk of recurrent infection.  -Recheck LFTs    4. Septic shock. Secondary to the necrotizing infection as above. Blood cultures negative. Resolved. 5.  Severe aortic stenosis.   No current clinical evidence of uncontrolled heart failure  -Cardiology follow-up  -Volume management    Have discussed the above management plan in detail with the primary service    Extensive review of the medical records in epic including review of the notes, radiographs, and laboratory results     HISTORY OF PRESENT ILLNESS:  Reason for Consult: Rising WBC count in the setting of necrotizing soft tissue infection  HPI: Olivia Gutierrez is a 80y.o. year old female with severe aortic stenosis, renal cell carcinoma status post left nephrectomy, hypertension admitted to [facility] in [facility] after sustaining a fall on 7/10/2023 and eventually being diagnosed with a necrotizing soft tissue infection in the perineum resulting in septic shock who were asked to assist with antibiotic management. The patient apparently sustained a fall and was found to have a pubic ramus fracture was managed nonoperatively. She was found initially to have a UTI and was treated with a course of ceftriaxone x3 days. However despite these interventions she began developing a rising WBC count and was eventually found to have a perianal abscess and underwent bedside I&D. However repeat imaging of the pelvic region revealed increasing gas tracking into the ischial rectal and ischial anal fossa and therefore the patient was taken to the OR on 7/17/2023 and underwent debridement. The antibiotics were broadened to Zosyn and linezolid. She remained in septic shock on a ventilator and on vasopressor support in the intensive care unit. She underwent repeat I&D on 7/18/2023 with additional purulence noted but minimal further debridement of necrotic tissue. She underwent additional debridement and laparoscopic colostomy on 7/20/2023. The patient has clinically improved and is now extubated with decreasing oxygen requirements. She is not requiring any vasopressor support and is moved out of the intensive care unit. Her white cell count has significantly decreased although it has bumped up a bit today. The linezolid has been discontinued. The operative cultures grew mixture of aerobic and anaerobic norman. Her previous urine culture had grown E. coli that was pansensitive. She continues to require low-level oxygen support by nasal cannula and has a bit of a cough.     REVIEW OF SYSTEMS:  A complete review of systems are negative other than that noted in the HPI     PAST MEDICAL HISTORY:  Past Medical History:   Diagnosis Date   • Cancer [facility])     Cervical, Kidney, melanoma Per MRI Screening form 2/5/19   • H/O left nephrectomy 7/11/2023     Past Surgical History:   Procedure Laterality Date   • NEPHRECTOMY Left    • WOUND DEBRIDEMENT N/A 7/17/2023    Procedure: DEBRIDEMENT WOUND [facility]); Surgeon: Norm Macias MD;  Location: [facility] OR;  Service: Trauma   • WOUND DEBRIDEMENT N/A 7/18/2023    Procedure: DEBRIDEMENT WOUND AND DRESSING CHANGE [facility] OUT); Surgeon: Norm Macias MD;  Location: [facility];  Service: General       FAMILY HISTORY:  Non-contributory    SOCIAL HISTORY:  Social History   Social History     Substance and Sexual Activity   Alcohol Use Not Currently     Social History     Substance and Sexual Activity   Drug Use Not Currently     Social History     Tobacco Use   Smoking Status Never   Smokeless Tobacco Never       ALLERGIES:  No Known Allergies    MEDICATIONS:  All current active medications have been reviewed.   Antibiotics: Zosyn, postop day 5/4    PHYSICAL EXAM:  Vitals:    07/21/23 1600 07/21/23 2258 07/22/23 0600 07/22/23 0733   BP:  104/60  130/79   BP Location:       Pulse:  90     Resp:  20  16   Temp:  97.5 °F (36.4 °C)  98.4 °F (36.9 °C)   TempSrc:       SpO2: 90% 93%  98%   Weight:   55.2 kg (121 lb 9.6 oz)    Height:             General Appearance:  Elderly, debilitated,, nontoxic, and in no distress   Head:  Normocephalic, without obvious abnormality, atraumatic   Eyes:  Conjunctiva pink and sclera anicteric, both eyes   Nose: Nares normal, mucosa normal, no drainage   Throat: Oropharynx moist without lesions   Neck: Supple, symmetrical, no adenopathy, no tenderness/mass/nodules   Back:   Symmetric, no curvature, ROM normal, no CVA tenderness   Lungs:   Decreased breath sounds bilaterally, respirations unlabored   Chest Wall:  No tenderness or deformity   Heart:  RRR; no murmur, rub or gallop   Abdomen:   Soft, non-tender, non-distended, positive bowel sounds, ostomy with output   Extremities: No cyanosis, clubbing or edema   Skin: No rashes or lesions. Perineal wounds dressed. Lymph nodes: Cervical, supraclavicular nodes normal   Neurologic: Alert and oriented times 3, extremity strength 5/5 and symmetric       LABS, IMAGING, & OTHER STUDIES:  Lab Results:  I have personally reviewed pertinent labs. Lab Results   Component Value Date    K 4.1 07/22/2023     (H) 07/22/2023    CO2 23 07/22/2023    BUN 35 (H) 07/22/2023    CREATININE 0.88 07/22/2023    GLUF 104 (H) 02/04/2020    CALCIUM 8.6 07/22/2023    CORRECTEDCA 10.4 (H) 07/19/2023    AST 50 (H) 07/19/2023    ALT 38 07/19/2023    ALKPHOS 226 (H) 07/19/2023    EGFR 56 07/22/2023     Lab Results   Component Value Date    WBC 33.31 (HH) 07/22/2023    HGB 8.7 (L) 07/22/2023    HCT 26.2 (L) 07/22/2023    MCV 89 07/22/2023     07/22/2023   No results found for: "Delontefariha Luz"  Results from last 7 days   Lab Units 07/20/23  1024 07/18/23  1100 07/17/23  1259   BLOOD CULTURE   --  No Growth at 72 hrs. No Growth at 72 hrs.  --    GRAM STAIN RESULT   --   --  2+ Polys  No bacteria seen   MRSA CULTURE ONLY  No Methicillin Resistant Staphlyococcus aureus (MRSA) isolated  --   --        Imaging Studies:     Chest x-ray. Retrocardiac opacity. Mild pulmonary vascular congestion    CT pelvis. Increased air in the perianal area tracking into the ischial anal fossa, ischial rectal fossa.   Cirrhotic liver    Images personally reviewed by me in PACS

## 2023-07-23 NOTE — CASE MANAGEMENT
Case Management Discharge Planning Note    Patient name Bj Levi  Location ICU 04/ICU 38 MRN [id number]  : 3/12/1928 Date 2023       Current Admission Date: 7/10/2023  Current Admission Diagnosis:Generalized weakness   Patient Active Problem List    Diagnosis Date Noted   • Ventilator dependence ([address]) 2023   • Necrotizing soft tissue infection 2023   • Acute encephalopathy 2023   • MAXX (acute kidney injury) ([address]) 07/15/2023   • Sepsis with acute organ dysfunction ([address]) 2023   • Cholelithiasis 2023   • History of rib fracture 2023   • Bilateral pleural effusion 2023   • Bilateral fracture of pubic rami, sequela 2023   • Fall 2023   • Generalized weakness 2023   • Acute UTI (urinary tract infection) 2023   • Closed nondisplaced zone II fracture of sacrum ([address]) 2023   • Hyponatremia 2023   • Anemia 2023   • H/O left nephrectomy 2023   • Raynaud phenomenon 2023   • Arthritis of carpometacarpal (CMC) joint of right thumb 2022   • Primary osteoarthritis of right wrist 2022   • Closed fracture of right proximal humerus 11/10/2022   • Nonrheumatic aortic valve stenosis 2017   • Primary hypertension 2014   • Left ventricular hypertrophy 2014      LOS (days): 12  Geometric Mean LOS (GMLOS) (days): 9.60  Days to GMLOS:-2.4     OBJECTIVE:  Risk of Unplanned Readmission Score: 16.62         Current admission status: Inpatient   Preferred Pharmacy:   [facility] [address] [facility], [address] [place] [id number]  Phone: [phone] Fax: [phone]    Primary Care Provider: Keysha Campbell MD    Primary Insurance: [facility]  Secondary Insurance:     DISCHARGE DETAILS:                                          Other Referral/Resources/Interventions Provided:  Interventions: Transportation, Hospice         Treatment Team Recommendation: Hospice  Discharge Destination Plan[de-identified] Hospice  Transport at Discharge : [facility]  Dispatcher Contacted: Yes  Number/Name of Dispatcher: Reqquested via [employer]  Transported by [employer] and Unit #): CANDIDO  ETA of Transport (Date): 07/23/23  ETA of Transport (Time): 2733                          CM notified by hospice liaison that patient was approved for IPU at [address]. CM was told that transportation could be arranged for any time. CM requested a 4:30 transport time and was contacted by CANDIDO to see if patient would be ready for 3:45. It was confirmed that this time works for all parties. Out of hospital DNR and medical necessity provided to primary nurse. Hospice liaison notified who advised family of transport time. No further CM DC needs were discussed or identified at this time.

## 2023-07-23 NOTE — CONSULTS
[address] [place] [address]  Consult: Critical Care  Name: Danelle Nowak 80 y.o. female I MRN: [id number]  Unit/Bed#: ICU 04 I Date of Admission: 7/10/2023   Date of Service: 7/22/2023 I Hospital Day: 11      Consults  Assessment/Plan     Neuro:   • Analgesia  ? Prn tylenol mild pain  ? Prn oxycodone 2.5/5mg moderate/severe pain  ? PRN dialudid 0.2 IV q4 for breakthrough pain  • Daily CAM-ICU, delirium precautions. Regulate sleep/wake cycle. ? Melatonin qhs     CV:   • Septic shock 2/2 NSTI. • Resolved, maintain MAP goal of >65    • Hx of HTN  ? Hold home cozaar  • Severe AS, moderate to severe MR, severe MS  • Cardiology consulted, no interventions warranted  • ? PE  • At 9:00 PM, patient began to develop tachycardia and tachypnea with increasing oxygen requirement  • Concern for PE vs. Worsening pleural effusion  • CT PE study obtained, pending final read. Personal read no obvious saddle embolus, however, bilateral pleural effusion with L > R.  • Continue supplemental oxygen, escalate as needed to maintain oxygen saturation >92%        Pulm:  • Acute respiratory insufficiency   ? Extubated 7/19 to NC. ? See above regarding ? PE  ? Currently on 15L NC saturating >92%  ? Maintain oxygen saturation >92%, escalate oxygen therapy as needed  · Pleural effusion  · 7/22 CT PE with significant pleural effusion appreciated bilaterally, L > R  · Giving lasix 80 mg IV  · Placed russell cath for accurate I and Os  · Continue supplemental oxygen        GI:   • GERD  ? GI ppx with protonix        :   • R perianal NSTI  ? S/p OR debridement 7/17, 7/18, 7/20. S/p colostomy 7/21. Trend ostomy output. Appreciate general surgery recommendations. Antibiotics as below. ? Plan for takeback to OR 7/23  • UTI, POA  ? Completed 3 days of rocephin (7/11-7/13) for E. Coli UTI  ? Russell placed 7/22  ? Monitor I and O's  • MAXX - resolved  ?  Trend strict I/Os, daily renal function        F/E/N:   • No fluids, getting lasix  • Replete electrolytes as needed for goal Mag > 2.0, Phos >3.0, K >4.0  • NPO for OR tomorrow        Heme/Onc:   • DVT ppx  ? SQH        Endo:   • No active issues        ID:   • R perianal NSTI  ? Discontinued zosyn, began Unasyn 3 G IV q6 per ID recs   ? Trend fever curve/white count        MSK/Skin:   • Sacral and L pubic rami fracture  ? Non operative management. WBAT per ortho recommendations  Disposition: Critical care     History of Present Illness     HPI: Aisha Prescott is a 80 y. o. who originally presented on 7/11 after a fall. Hospital course most significant for developing NSTI of right groin and perineal area requiring debridement and washout x3 along with more recently as of 7/20 a diverting transverse loop colostomy. Today, at approximately 9:00 PM, patient began to develop worsening tachycardia and tachypnea in the setting of increasing oxygen requirement. Oxygen requirement was upwards of 15 L. After initiation of oxygen, patient's oxygen saturation was >92%. Was immediately taken to CT scanner for CT PE, final read still pending, however, significant effusions bilaterally, L > R. Transferred back to ICU given increased oxygen requirement. History obtained from chart review and the patient. Review of Systems   Constitutional: Negative for activity change, appetite change, chills and fever. HENT: Negative for congestion, sinus pressure and sinus pain. Eyes: Negative for visual disturbance. Respiratory: Positive for shortness of breath. Negative for chest tightness. Cardiovascular: Negative for chest pain and palpitations. Gastrointestinal: Negative for abdominal distention, nausea and vomiting. Endocrine: Negative for polydipsia and polyuria. Genitourinary: Negative for difficulty urinating and flank pain. Musculoskeletal: Negative for back pain. Skin: Positive for wound. Ostomy, right groin wound   Neurological: Negative for dizziness and headaches. Psychiatric/Behavioral: Negative for agitation and confusion. Historical Information   Past Medical History:  No date: Cancer [facility])      Comment:  Cervical, Kidney, melanoma Per MRI Screening form 2/5/19 7/11/2023: H/O left nephrectomy Past Surgical History:  No date: NEPHRECTOMY; Left  7/20/2023: UT LAPAROSCOPY SURG COLOSTOMY/SKN LVL CECOSTOMY; N/A      Comment:  Procedure: COLOSTOMY LAPAROSCOPIC;  Surgeon: Jenifer Solo DO;  Location: [facility] MAIN [facility];  Service: Trauma  7/17/2023: WOUND DEBRIDEMENT; N/A      Comment:  Procedure: DEBRIDEMENT WOUND ([address] [place] [address] Street OUT); Surgeon: Lashanda Michelle MD;  Location: [facility];  Service: Trauma  7/18/2023: WOUND DEBRIDEMENT; N/A      Comment:  Procedure: DEBRIDEMENT WOUND AND DRESSING CHANGE ([address] [place] [address]                OUT); Surgeon: Lashanda Michelle MD;  Location: [facility]                MAIN OR;  Service: General  7/20/2023: WOUND DEBRIDEMENT; Bilateral      Comment:  Procedure: DEBRIDEMENT WOUND [facility] OUT);   Surgeon: Jenifer Solo DO;  Location: [facility];  Service: Trauma   Current Outpatient Medications   Medication Instructions   • losartan (COZAAR) 100 mg, Oral, Daily   • omeprazole (PRILOSEC) 20 mg, Oral, Daily, Take before a meal   • predniSONE 10 mg tablet TAKE 4 TABLETS FOR 2 DAYS, 3 TABLETS FOR 2 DAYS, 2 TABLETS FOR 2 DAYS, 1 TABLET FOR 2 DAYS THEN OFF    No Known Allergies   Social History     Tobacco Use   • Smoking status: Never   • Smokeless tobacco: Never   Substance Use Topics   • Alcohol use: Not Currently   • Drug use: Not Currently    Family History   Problem Relation Age of Onset   • Brain cancer Other           Objective                            Vitals I/O      Most Recent Min/Max in 24hrs   Temp (!) 96.8 °F (36 °C) Temp  Min: 96.8 °F (36 °C)  Max: 98.5 °F (36.9 °C)   Pulse 100 Pulse  Min: 90  Max: 100   Resp (!) 40 Resp  Min: 16  Max: 40   /82 BP  Min: 104/60  Max: 135/82   O2 Sat 100 % SpO2  Min: 93 %  Max: 100 %      Intake/Output Summary (Last 24 hours) at 7/22/2023 2149  Last data filed at 7/22/2023 1301  Gross per 24 hour   Intake 120 ml   Output 450 ml   Net -330 ml         Diet Dysphagia/Modified Consistency; Dysphagia 2-Mechanical Soft; Thin Liquid  Diet NPO; Sips with meds     Invasive Monitoring Physical exam   n/a     Physical Exam  Constitutional:       General: She is in acute distress. Appearance: She is ill-appearing. HENT:      Head: Normocephalic. Nose: Nose normal.      Mouth/Throat:      Mouth: Mucous membranes are moist.      Pharynx: Oropharynx is clear. Eyes:      Extraocular Movements: Extraocular movements intact. Pupils: Pupils are equal, round, and reactive to light. Cardiovascular:      Rate and Rhythm: Tachycardia present. Pulses: Normal pulses. Pulmonary:      Effort: Respiratory distress present. Breath sounds: No wheezing. Comments: Tachypnic  Chest:      Chest wall: No tenderness. Abdominal:      General: Abdomen is flat. There is no distension. Palpations: Abdomen is soft. Comments: Ostomy in place. Appears pink and viable. Solid stool output. Abdomen is soft, nontender, nondistended. Musculoskeletal:         General: No swelling or tenderness. Skin:     Coloration: Skin is not jaundiced. Neurological:      General: No focal deficit present. Mental Status: She is oriented to person, place, and time. Psychiatric:         Mood and Affect: Mood normal.         Behavior: Behavior normal.            Diagnostic Studies      EKG: See chart  Imaging: Significant bilateral pleural effusions, L > R I have personally reviewed pertinent reports.        Medications:  Scheduled PRN   ampicillin-sulbactam, 3 g, Q8H  carbamide peroxide, 5 drop, BID  enoxaparin, 30 mg, Q12H NELSY  furosemide, 80 mg, Once  melatonin, 3 mg, HS  pantoprazole, 40 mg, Early Morning  sodium bicarbonate, 1,300 mg, BID after meals      acetaminophen, 650 mg, Q6H PRN  HYDROmorphone, 0.2 mg, Q4H PRN  oxyCODONE, 5 mg, Q4H PRN  oxyCODONE, 2.5 mg, Q4H PRN       Continuous          Labs:    CBC    Recent Labs     07/21/23  0425 07/22/23  0611   WBC 31.69* 33.31*   HGB 7.8* 8.7*   HCT 23.8* 26.2*    301     BMP    Recent Labs     07/21/23  0430 07/22/23  0611   SODIUM 143 142   K 4.3 4.1   * 115*   CO2 22 23   AGAP 8 4   BUN 38* 35*   CREATININE 0.87 0.88   CALCIUM 8.3 8.6       Coags    No recent results     Additional Electrolytes  Recent Labs     07/20/23  2347 07/21/23  0411 07/21/23  0430 07/22/23  0611   MG 2.3  --  2.1 2.2   PHOS 3.0  --  2.8  --    CAIONIZED 1.07* 1.08*  --   --           Blood Gas    No recent results  Recent Labs     07/22/23  2104   PHVEN 7.533*   VFX5DQL 23.3*   PO2VEN 81.1*   LYS8CTC 19.2*   BEVEN -2.3    LFTs  No recent results    Infectious  No recent results  Glucose  Recent Labs     07/20/23  2347 07/21/23  0430 07/22/23  0611   GLUC 137 126 127              Critical Care Time Delivered: Upon my evaluation, this patient had a high probability of imminent or life-threatening deterioration due to worsening tachycardia and tachypnea concerning for pulmonary embolism, which required my direct attention, intervention, and personal management. I have personally provided 30 minutes of critical care time, exclusive of procedures, teaching, family meetings, and any prior time recorded by providers other than myself.      Jacqueline Cobb MD

## 2023-07-23 NOTE — PROGRESS NOTES
Progress Note - Infectious Disease   Zahida Styles 80 y.o. female MRN: [id number]  Unit/Bed#: ICU 04 Encounter: [id number]      Impression/Plan:  1. Perianal necrotizing soft tissue infection. Status post I&D x3 and broad-spectrum antibiotics. The patient is now clinically deteriorated from a respiratory standpoint has been made comfort care only. Infectious E service will sign off for now. Please call for questions    Antibiotics:  None    Subjective:  Patient apparently clinically deteriorated from a respiratory standpoint last night felt to be related to her primary cardiac status with severe aortic stenosis.   She is now comfort care only    Objective:  Vitals:  Temp:  [96.8 °F (36 °C)-98.5 °F (36.9 °C)] 97.8 °F (36.6 °C)  HR:  [] 84  Resp:  [16-40] 28  BP: (110-135)/(52-82) 111/56  SpO2:  [91 %-100 %] 91 %  Temp (24hrs), Av.7 °F (36.5 °C), Min:96.8 °F (36 °C), Max:98.5 °F (36.9 °C)  Current: Temperature: 97.8 °F (36.6 °C)    Physical Exam:   General Appearance:   Resting on BiPAP       Labs, Imaging, & Other studies:   All pertinent labs and imaging studies were personally reviewed  Results from last 7 days   Lab Units 23  2104 23  0611   WBC Thousand/uL 40.14* 48.45* 33.31*   HEMOGLOBIN g/dL 8.1* 9.4* 8.7*   PLATELETS Thousands/uL 357 391* 301     Results from last 7 days   Lab Units 23  2215 23  0611 23  0430 23  0451 23  0523 23  1427 23  0516   SODIUM mmol/L 144 142 143   < > 137   < > 135   POTASSIUM mmol/L 4.9 4.1 4.3   < > 5.4*   < > 5.6*   CHLORIDE mmol/L 114* 115* 113*   < > 111*   < > 106   CO2 mmol/L    < > 19*   < > 19*   BUN mg/dL 34* 35* 38*   < > 55*   < > 49*   CREATININE mg/dL 1.03 0.88 0.87   < > 1.15   < > 1.21   EGFR ml/min/1.73sq m 46 56 56   < > 40   < > 38   CALCIUM mg/dL 8.4 8.6 8.3   < > 8.7   < > 9.0   AST U/L 42  --   --   --  50*  --  49*   ALT U/L 33  --   --   --  38  --  36   ALK PHOS U/L 129*  --   --   --  226*  --  278*    < > = values in this interval not displayed. Results from last 7 days   Lab Units 07/20/23  1024 07/18/23  1100 07/17/23  1259   BLOOD CULTURE   --  No Growth After 4 Days. No Growth After 4 Days.   --    GRAM STAIN RESULT   --   --  2+ Polys  No bacteria seen   MRSA CULTURE ONLY  No Methicillin Resistant Staphlyococcus aureus (MRSA) isolated  --   --

## 2023-07-23 NOTE — QUICK NOTE
Notified by nursing of increased tachypnea with RR of 40. On initial evaluation patient complaining of SOB, RR of 40, O2 of 99% on room. CXR, CBC, lactic, and VBG obtained. Patient was updated to SD1 with plan for CTA to rule out PE. Therapeutic lovenox was given. After CXR obtained patient acutely desaturated to 58%, a rapid response was called and placed on nonrebreather with improvement in saturation. Patient transported to CT scan and ICU for further management. Family updated and all questions answered.  Code status clarified, patient to remain DNR/DNI    Merlin Manifold, DO  Surgery, PGY4

## 2023-07-23 NOTE — ACP (ADVANCE CARE PLANNING)
Advanced Care Planning Note     Ministerio Abreu 80 y.o. female MRN: [id number]    Unit/Bed#: ICU 04 Encounter: [id number]    Stu Graff is a 81 yo female that presented today secondary to having an acute condition requiring critical care provider evaluation. The patient has chronic comorbidities, including but not limited to advanced age, HTN, and now is inflicted with following acute conditions: fall, NSTI, b/l pleural effusions, MAXX, hyponatremia, b/l pubic rami fracture. Due to the severity of the patient's acute condition and/or the extent of chronic conditions, there is need for advanced care planning at this time. Please see my previous documentation in regards to the patient's current condition, assessment, and treatment plan. During this discussion with the patient and/or family members, it was established that all stake holders were agreeable and understood the rationale for advanced care planning to occur at this time. The following individuals were present & participated in the face to face conversation I had about the patient's advanced directives & advanced care planning: Nasreen Pedro, patient's daughter. Daughter stated she wished her mom would be in peace, go to sleep, and be with her  in [place]. I discussed patient's acute clinical deterioration today and CT chest findings of b/l pleural effusions. I discussed patient's diminished UOP response to IV Lasix as well as poor clinical response despite BiPap placement. We discussed patient's declined mental status and skin coloration changes. At this time daughter Marvella Schwab and her brothers over the phone an d are statign that she would not want o go back on Bipap or continue to have dressing changes of wounds. They are in agreement to transition patient to [address] [place] [address]. Patient's son is traveling up from [address] [place] [address]. Patient's granddaughters were in earlier today and are also in agreement.      Total time spent, (45) minutes (0245 to 0330).    CODE STATUS: Level 4 - Comfort Care  POA:    POLST:      SIGNATURE: Evangelist Toscano PA-C  DATE: July 23, 2023  TIME: 3:37 AM

## 2023-07-23 NOTE — DISCHARGE SUMMARY
Discharge Summary - Miranda Closs 80 y.o. female MRN: [id number]    Unit/Bed#: ICU 04 Encounter: [id number]    Admission Date:   Admission Orders (From admission, onward)     Ordered        07/11/23 1441  Inpatient Admission  Once            07/11/23 0307  Place in Observation  Once            07/11/23 0255  INPATIENT ADMISSION  Once                        Admitting Diagnosis: UTI (urinary tract infection) [N39.0]  Fatigue [R53.83]  Sacral fracture ([address]) [S32.10XA]  Fall, initial encounter [W19. XXXA]  Unspecified multiple injuries, initial encounter [T07. XXXA]    HPI: Miranda Closs is a 80 y.o. female who presents with fall. Olivia Aleks off toilet onto her buttock, no head strike, no LOC, no thinners. Difficulty ambulating x2 weeks after a mechanical fall 2 weeks ago. Was seen at urgent care with negative pelvis XR and discharged. Pain worse today after fall and unable to ambulate. At rest, patient has no complaints. Also had fever and chills about a week ago, on arrival temp 100.4. Pt reports dysuria and urinary frequency for past few days. In ED, pt had positive UA and started on rocephin. Procedures Performed:   7/15 bedside I&D perianal abscess  7/17 Extensive excisional debridement of perineum  7/18 Debridement wound and dressing change (wash out)  7/20 washout debridement, lap transverse colostomy      Summary of Hospital Course: Patient suffered a fall and was admitted to trauma service on July 11, 2023. Found to have a sacral fracture prompting trauma admission. Patient also complained of dysuria and fever and chills a week ago. Orthopedics recommended nonoperative management of sacral fracture. Patient had leukocytosis which jossy to 25,000. Initially, there was concern for acute cholecystitis, which was ruled out by right upper quadrant ultrasound on July 14. On July 15 she had leukocytosis to 44,000, found to have perianal abscess which was treated via bedside incision and drainage.   She had persistent leukocytosis up to 49,000 the morning of the 17th. She was rescanned and found to have new subcutaneous air tracking from the right perianal area concerning for necrotizing soft tissue infection. She was emergently taken to the operating room for debridement, which confirmed necrotizing soft tissue infection. Although she was DNR and DNI level 3, due to her good functional status family allowed intubation for operating room and desired full, aggressive surgical treatment including fecal diversion. She required a large debridement and was taken to the ICU intubated. Echocardiogram revealed worsening heart failure with low gradient advanced aortic stenosis in addition to moderate to severe mitral stenosis and regurgitation. She underwent  second look and washout on the 18th, postop day 1 from the index. She was extubated on the 19th. She underwent her next debridement on July 20 and was downgraded from ICU level of care to trauma floor on the 21st.  Evening of the 22nd she suffered respiratory decompensation prompting upgrade to ICU level of care, did not require inpatient. Family was called and the decision was made early morning of July 23, at approximately 3:30 AM, to transition to comfort care/level 4. Palliative care team was consulted and patient was deemed good candidate for transfer to hospice house. She was discharged in good condition to hospice house afternoon of July 23, 2023.     Significant Findings, Care, Treatment and Services Provided: Sacral fracture, UTI, perianal abscess, NSTI    Complications: Perineal NSTI    Discharge Diagnosis: Fall, UTI, perianal abscess, NSTI    Medical Problems     Resolved Problems  Date Reviewed: 7/22/2023   None         Condition at Discharge: Hemodynamically stable at the time of writing this discharge summary, awaiting transport to hospice house         Discharge instructions/Information to patient and family:   See after visit summary for information provided to patient and family. Provisions for Follow-Up Care:  See after visit summary for information related to follow-up care and any pertinent home health orders. PCP: Nichole Pineda MD    Disposition: Hospice house    Planned Readmission: No          Discharge Statement   I spent 25 minutes discharging the patient. This time was spent on the day of discharge. I had direct contact with the patient on the day of discharge. Additional documentation is required if more than 30 minutes were spent on discharge. Discharge Medications:  See after visit summary for reconciled discharge medications provided to patient and family.

## 2023-07-23 NOTE — CASE MANAGEMENT
Case Management Discharge Planning Note    Patient name Michelle Fontan  Location ICU 04/ICU 00 MRN [id number]  : 3/12/1928 Date 2023       Current Admission Date: 7/10/2023  Current Admission Diagnosis:Generalized weakness   Patient Active Problem List    Diagnosis Date Noted   • Ventilator dependence ([address]) 2023   • Necrotizing soft tissue infection 2023   • Acute encephalopathy 2023   • MAXX (acute kidney injury) ([address]) 07/15/2023   • Sepsis with acute organ dysfunction ([address]) 2023   • Cholelithiasis 2023   • History of rib fracture 2023   • Bilateral pleural effusion 2023   • Bilateral fracture of pubic rami, sequela 2023   • Fall 2023   • Generalized weakness 2023   • Acute UTI (urinary tract infection) 2023   • Closed nondisplaced zone II fracture of sacrum ([address]) 2023   • Hyponatremia 2023   • Anemia 2023   • H/O left nephrectomy 2023   • Raynaud phenomenon 2023   • Arthritis of carpometacarpal (CMC) joint of right thumb 2022   • Primary osteoarthritis of right wrist 2022   • Closed fracture of right proximal humerus 11/10/2022   • Nonrheumatic aortic valve stenosis 2017   • Primary hypertension 2014   • Left ventricular hypertrophy 2014      LOS (days): 12  Geometric Mean LOS (GMLOS) (days): 9.60  Days to GMLOS:-2.3     OBJECTIVE:  Risk of Unplanned Readmission Score: 16.58         Current admission status: Inpatient   Preferred Pharmacy:   [facility] [address] [facility], [address] [place] [id number]  Phone: [phone] Fax: [phone]    Primary Care Provider: Puja Kim MD    Primary Insurance: [facility]  Secondary Insurance:     DISCHARGE DETAILS:    Discharge planning discussed with[de-identified] Milan Li (Child)  Freedom of Choice: Yes  Comments - Freedom of Choice: Discussed consult recieved and FOC for hospice referral  CM contacted family/caregiver?: Yes  Were Treatment Team discharge recommendations reviewed with patient/caregiver?: Yes          Contacts  Patient Contacts: Beka Rinaldi (Child)  Relationship to Patient[de-identified] Family  Contact Method: Phone  Phone Number: [phone] Jenni Balderrama  Reason/Outcome: Discharge Planning, Referral, Emergency Contact, Continuity of [address]         Is the patient interested in [address] at discharge?: No    DME Referral Provided  Referral made for DME?: No    Other Referral/Resources/Interventions Provided:  Interventions: Hospice                                        CM consult received for a referral to hospice. CM spoke with patient's daughter and discussed FOC of hospice agencies. Plan is for referral to [address] [place] [address] to have patient evaluated for hospice appropriateness. [facility] CM department will continue to follow to assist with discharge coordination.

## 2023-07-23 NOTE — QUICK NOTE
Downgrading patient for comfort care measures not requiring ICU level of care. Placed transfer order and signed out to Dr. Rachel Keene from trauma team. Placed order for palliative care consult.

## 2023-07-23 NOTE — NURSING NOTE
Pt alert, in no apparent distress, discharged via stretcher, belongings sent with family. Report called to hospice house.

## 2023-07-23 NOTE — RAPID RESPONSE
Rapid Response Note  Julius Bruce 80 y.o. female MRN: [id number]  Unit/Bed#: [facility] 803-01 Encounter: [id number]    Rapid Response Notification(s):   Response called date/time:  7/22/2023 9:10 PM  Response team arrival date/time:  7/22/2023 9:12 PM  Response end date/time:  7/22/2023 9:27 PM  [facility]  Rapid response location:  [facility] unit  Primary reason for rapid response call:  Acute change in O2 sat (tachypneic, hypoxic)    Rapid Response Intervention(s):   Airway:  None  Breathing interventions: NRB. Circulation:  None  Fluids administered:  None  Medications administered: Therapeutic Lovenox dosing        Assessment:   · B/L pleural effusion in the setting of severe aortic stenosis    Plan:   · CTA chest PE study obtained and reviewed  · Therapeutic Lovenox dosing given during RR - will plan to continue with ppx trauma dosing  · 80 of IV Lasix ordered  · STAT labs sent   · Family contacted   · Bedside discussion with surgery   · Upgrade to ICU - see consult note      Rapid Response Outcome:   Transfer:  Transfer to ICU  Primary service notified of transfer: Yes    Code Status: Level 3 (DNAR and DNI)      Family notified of transfer: yes  Family member contacted: Martha Nicolas     Background/Situation:   Julius Bruce is a 80 y.o. female with current medical hx of NSTI/perirectal abscess after traumatic fall and hematoma who presents as a RR for tachypnea/hypoxia. Patient had insidious onset of WOB noted to have start at rest. She has been afebrile all day with worsening tachycardia, and hypoxia supposedly Spo2 in 50s. Surgery planned to send patient for CTA PE study.     Review of Systems   Unable to perform ROS: Acuity of condition   Respiratory: Positive for shortness of breath.         + Difficulty breathing        Objective:   Vitals:    07/22/23 0733 07/22/23 1507 07/22/23 2042 07/22/23 2048   BP: 130/79 121/73 135/82    BP Location:       Pulse:  91 100    Resp: 16 16 (!) 40 Temp: 98.4 °F (36.9 °C) 98.5 °F (36.9 °C) (!) 96.8 °F (36 °C)    TempSrc:       SpO2: 98% 94% 97% 100%   Weight:       Height:         Physical Exam  Vitals and nursing note reviewed. Constitutional:       General: She is in acute distress. Appearance: She is well-developed. She is ill-appearing. She is not toxic-appearing or diaphoretic. HENT:      Head: Normocephalic and atraumatic. Eyes:      Conjunctiva/sclera: Conjunctivae normal.   Cardiovascular:      Rate and Rhythm: Normal rate and regular rhythm. Heart sounds: No murmur heard. Pulmonary:      Effort: Pulmonary effort is normal. No respiratory distress. Breath sounds: Normal breath sounds. Comments: Able to speak in 5 word phrases    Tolerating laying flat during CT scan and transport     WOB improved with tachypnea/hypoxia   Abdominal:      Palpations: Abdomen is soft. Tenderness: There is no abdominal tenderness. Musculoskeletal:         General: No swelling. Cervical back: Neck supple. Right lower leg: Edema present. Left lower leg: Edema present. Skin:     General: Skin is warm and dry. Capillary Refill: Capillary refill takes less than 2 seconds. Coloration: Skin is jaundiced. Neurological:      Mental Status: She is alert. Cranial Nerves: No cranial nerve deficit or dysarthria. Motor: No weakness or tremor. Comments: Less interactive due to respiratory distress but at baseline neuro status     ICU deconditioning - global weakness    Psychiatric:         Mood and Affect: Mood normal.         Portions of the record may have been created with voice recognition software. Occasional wrong word or "sound a like" substitutions may have occurred due to the inherent limitations of voice recognition software. Read the chart carefully and recognize, using context, where substitutions have occurred.     Tawana Loyd PA-C

## 2023-07-23 NOTE — PROGRESS NOTES
-- Patient:  -- MRN: [id number]  -- Aidin Request ID: [id number]  -- Level of care reserved: Hospice  -- Partner Reserved: [facility] [place], [place], [address] [phone]  -- Clinical needs requested:  -- Geography searched: [phone]  -- Start of Service:  -- Request sent: 11:57am EDT on 7/23/2023 by Lina Lu  -- Partner reserved: 3:57pm EDT on 7/23/2023 by Lina Lu  -- Choice list shared: 3:57pm EDT on 7/23/2023 by Lina Lu

## 2023-07-23 NOTE — PROGRESS NOTES
[address] [facility]  Progress Note: Critical Care  Name: Edwin Felix 80 y.o. female I MRN: [id number]  Unit/Bed#: ICU 04 I Date of Admission: 7/10/2023   Date of Service: 7/23/2023 I Hospital Day: 12    Assessment/Plan    1) Comfort Care Measures Only    - Morphine IV 2mg q 30 min PRN severe pain/dyspnea/air hunger    - Ativan 1mg q 2hr PRN agitation/anxiety/seizure   - Artifical tears, saliva substitute    - Avoid further lab draws    - Holding PO meds    - Pleasure feed diet     - Vitals q2    - Social support provided/GOC discussion/Symptom mgmt    - MS level of care   2) B/l pleural effusions   - Holding repeat Lasix dose   - Continue russell for comfort   - F/u CT CAP  3) Fall   - Patient resting comfortably in bed    - DVT ppx/SCDs removed    - Electrolytes no longer repleted or observed  4) NSTI    - Surgery aware of Comfort Care measures only, can sign off    - Will no longer do bedside dressing changes   - Unasyn discontinued    Disposition: Med Surg    ICU Core Measures     A: Assess, Prevent, and Manage Pain · Has pain been assessed? Yes  · Need for changes to pain regimen? No   B: Both SAT/SAT  · N/A   C: Choice of Sedation · RASS Goal: 0 Alert and Calm  · Need for changes to sedation or analgesia regimen? No   D: Delirium · CAM-ICU: Negative   E: Early Mobility  · Plan for early mobility? NA   F: Family Engagement · Plan for family engagement today? Yes       Review of Invasive Devices: Russell Plan: end of life care        Prophylaxis:  VTE Contraindicated secondary to: [address]  not ordered          Subjective   HPI/24hr events: Edwin Felix is a 80 y. o. who originally presented on 7/11 after a fall. Hospital course most significant for developing NSTI of right groin and perineal area requiring debridement and washout x3 along with more recently as of 7/20 a diverting transverse loop colostomy.  Yesterday, at approximately 9:00 PM, patient began to develop worsening tachycardia and tachypnea in the setting of increasing oxygen requirement. Oxygen requirement was upwards of 15 L. After initiation of oxygen, patient's oxygen saturation was >92%. Was immediately taken to CT scanner for CT PE, final read still pending, however, significant effusions bilaterally, L > R. Transferred back to ICU given increased oxygen requirement. Patient was given 80mg of IV Lasix with 1.5L output. She continued to have WOB and dyspnea and ultimately family decided to pursue Comfort Care. Review of Systems   Unable to perform ROS: Acuity of condition        Objective                            Vitals I/O      Most Recent Min/Max in 24hrs   Temp 97.8 °F (36.6 °C) Temp  Min: 96.8 °F (36 °C)  Max: 98.5 °F (36.9 °C)   Pulse 84 Pulse  Min: 82  Max: 108   Resp (!) 28 Resp  Min: 16  Max: 40   /56 BP  Min: 110/56  Max: 135/82   O2 Sat 91 % SpO2  Min: 91 %  Max: 100 %      Intake/Output Summary (Last 24 hours) at 7/23/2023 0345  Last data filed at 7/23/2023 0101  Gross per 24 hour   Intake 380 ml   Output 1800 ml   Net -1420 ml         Diet Regular; Pleasure Feed     Invasive Monitoring Physical exam    Physical Exam  Vitals and nursing note reviewed. Constitutional:       General: She is not in acute distress. Appearance: She is well-developed. She is ill-appearing and toxic-appearing. Comments: Acute on chronic ill appearance   HENT:      Head: Normocephalic and atraumatic. Eyes:      General: No scleral icterus. Right eye: No discharge. Left eye: No discharge. Extraocular Movements: Extraocular movements intact. Conjunctiva/sclera: Conjunctivae normal.      Pupils: Pupils are equal, round, and reactive to light. Cardiovascular:      Rate and Rhythm: Normal rate and regular rhythm. Heart sounds: No murmur heard. Pulmonary:      Effort: Pulmonary effort is normal. No respiratory distress.       Breath sounds: Normal breath sounds. Comments: Open mouth breathing    Air hungry   Abdominal:      Palpations: Abdomen is soft. Tenderness: There is no abdominal tenderness. Musculoskeletal:         General: No swelling or tenderness. Cervical back: Neck supple. Skin:     General: Skin is warm and dry. Capillary Refill: Capillary refill takes less than 2 seconds. Coloration: Skin is jaundiced. Comments: Wound with minimal purulent drainage- lacking erythema    Neurological:      Mental Status: She is alert. Cranial Nerves: No cranial nerve deficit or dysarthria. Motor: Weakness present. Comments: Minimally responsive by nodding yes or no to questions     No longer speaking when addressed    Age/ICU associated deconditioning     Fatigued        Psychiatric:         Mood and Affect: Mood normal.            Diagnostic Studies      Comfort Care    CTA Chest PE study outstanding- neg for PE on my read, b/l pleural effusions present      Medications:  Scheduled PRN   artificial tear, , HS      LORazepam, 1 mg, Q10 Min PRN  morphine injection, 2 mg, Q30 Min PRN  saliva substitute, 5 spray, 4x Daily PRN       Continuous          Labs:    CBC    Recent Labs     07/22/23 2104 07/22/23 2215   WBC 48.45* 40.14*   HGB 9.4* 8.1*   HCT 30.0* 26.1*   * 357   BANDSPCT 3 2     BMP    Recent Labs     07/22/23  0611 07/22/23  2215   SODIUM 142 144   K 4.1 4.9   * 114*   CO2 23 22   AGAP 4 8   BUN 35* 34*   CREATININE 0.88 1.03   CALCIUM 8.6 8.4       Coags    No recent results     Additional Electrolytes  Recent Labs     07/21/23  0411 07/21/23  0430 07/21/23  0430 07/22/23  0611 07/22/23  2215   MG  --  2.1   < > 2.2 2.1   PHOS  --  2.8  --   --  3.6   CAIONIZED 1.08*  --   --   --  1.11*    < > = values in this interval not displayed.           Blood Gas    No recent results  Recent Labs     07/22/23 2104   PHVEN 7.533*   KLR9CNE 23.3*   PO2VEN 81.1*   JYC8MIT 19.2*   BEVEN -2.3    LFTs  Recent Labs     07/22/23  2215   ALT 33   AST 42   ALKPHOS 129*   ALB 2.1*   TBILI 0.51       Infectious  No recent results  Glucose  Recent Labs     07/21/23  0430 07/22/23  0611 07/22/23 2215   GLUC 126 127 154*               No Critical Care time delivered.      Jordan Hurst PA-C

## 2023-07-23 NOTE — CONSULTS
Consultation - Palliative & Supportive Care   Bj Levi  80 y.o.  female  ICU 04/ICU 04   MRN: [id number]  Encounter: [id number]    ASSESSMENT:    Patient Active Problem List   Diagnosis   • Closed fracture of right proximal humerus   • Arthritis of carpometacarpal [place]) joint of right thumb   • Primary osteoarthritis of right wrist   • Generalized weakness   • Acute UTI (urinary tract infection)   • Closed nondisplaced zone II fracture of sacrum (HCC)   • Hyponatremia   • Anemia   • Primary hypertension   • H/O left nephrectomy   • Raynaud phenomenon   • Fall   • Bilateral fracture of pubic rami, sequela   • Sepsis with acute organ dysfunction (HCC)   • Cholelithiasis   • History of rib fracture   • Bilateral pleural effusion   • MAXX (acute kidney injury) ([address])   • Necrotizing soft tissue infection   • Acute encephalopathy   • Ventilator dependence (HCC)   • Nonrheumatic aortic valve stenosis   • Left ventricular hypertrophy     Active issues specifically addressed today include: goals of care, hospice services / EOL care, Palliative Care encounter, necrotizing soft tissue infection, large bilateral pleural effusions, recent trauma w/ multiple fractures, acute pain, acute respiratory failure    89F w/ severe aortic stenosis admitted for fall w/ resulting sacral and pubic rami fractures, now w/ necrotizing soft tissue infection; patient made comfort care after RRT called for acute respiratory failure s/t large bilateral pleural effusions. PLAN:    1. Goals:   • Goals are clear, continue LOC4 / comfort cares. • Placed hospice referral and notified Hospice Liaison team.    2. Social Support:  • Supportive listening provided    3. Symptom management:  • Continue comfort care medications. Patient comfortable on current regimen. 4. Care Coordination  • Case discussed w/ Critical Care resident, Hospice Liaison. 5. Follow-up  • We appreciate the opportunity to participate in this patient's care.    • Please do not hesitate to contact our on-call provider through our clinic answering service at [phone] should there be an acute change or other symptom control concerns. Code status: Level 4 - Comfort Care   Decisional apparatus:  Patient does not have capacity to make medical decisions on my exam today. If such capacity is lost, patient's substitute decision maker would default to adult childen by [place] Act 169. Advance Directive / Living Will / POLST:  Nothing on file. I have reviewed the patient's controlled substance dispensing history in the Prescription Drug Monitoring Program in compliance with the [facility] regulations before prescribing any controlled substances. IDENTIFICATION:  Inpatient consult to Palliative Care  Consult performed by: Jamshid Chauhan MD  Consult ordered by: Carmen Hernandez MD      Reason for Consult / Principal Problem: "Level 4 comfort care"    HISTORY OF PRESENT ILLNESS:    Mara Delaney is a 80 y.o. female with severe aortic stenosis, HTN, GERD    Patient admitted /11/23 after a fall w/ no LOC; she also had a mechanical fall 2 weeks PTA which resulted in some ambulatory dysfunction. Sacral and L pubic rami fractures noted, non-operative management per Orthopedics. Hospital stay complicated by septic shock s/t necrotizing soft tissue infection, acute respiratory failure, MAXX, UTI. She is s/p multiple debridements. RRT called yesterday evening for respiratory distress; large bilateral pleural effusions were noted. Per Surgery noted from 7/22/23 2149h:  "This patient is critically ill. She had an acute onset of respiratory distress with RR > 40 and then desaturation to 80%. She is DNI, but responded to High flow O2 and saturation improved to 99%. Concern was for Acute pulmonary embolism and stat CT chest was ordered. Team accompanied pt to CT scan where B/L large pleural effusions were noted. These were much larger than effusions seen on previous imaging.       Family was called and goals of care were delineated. No invasive measure at this time. Pt transferred to ICU for high flow nasal O2 as she did not want Mask due to discomfort     Aggressive diureses with Lasix was instituted - over the course of 4-6 hours, the patient has worsening encephalopathy and family was re-engaged in discussion. At that time it was determined that any further therapy would violate her wishes and she was transitioned to comfort measures."  See also ACP note from 7/23/23 0337h. Patient son traveling up from [facility]. Patient's "daughter Pavan Ramos and her brothers over the phone an d are statign that she would not want to go back on Bipap or continue to have dressing changes of wounds . .. they are in agreement to transition patient to [address] [place] [address] . .. granddaughters were in earlier today and are also in agreement". Patient status LOC4 today AM based on those family discussions, and comfort orders are in place. As of today AM all comfort medications are ordered IV; symptoms appear managed when patient seen in hospital bed. Family present. Family expressed understanding of current situation and confirm desire to keep patient comfortable. Interview and exam limited by: acuity of condition    Review of Systems   Unable to perform ROS: Acuity of condition       Past Medical History:   Diagnosis Date   • Cancer [facility])     Cervical, Kidney, melanoma Per MRI Screening form 2/5/19   • H/O left nephrectomy 7/11/2023     Past Surgical History:   Procedure Laterality Date   • NEPHRECTOMY Left    • VA LAPAROSCOPY SURG COLOSTOMY/SKN LVL CECOSTOMY N/A 7/20/2023    Procedure: COLOSTOMY LAPAROSCOPIC;  Surgeon: Alonzo Santana DO;  Location: [facility];  Service: Trauma   • WOUND DEBRIDEMENT N/A 7/17/2023    Procedure: DEBRIDEMENT WOUND [facility] OUT);   Surgeon: Stacia Soria MD;  Location: [facility];  Service: Trauma   • WOUND DEBRIDEMENT N/A 7/18/2023    Procedure: DEBRIDEMENT WOUND AND DRESSING CHANGE [facility] OUT); Surgeon: Henny Swenson MD;  Location: [facility];  Service: General   • WOUND DEBRIDEMENT Bilateral 7/20/2023    Procedure: DEBRIDEMENT WOUND [facility] OUT); Surgeon: Lanny Morales DO;  Location: [facility];  Service: Trauma     Social History     Socioeconomic History   • Marital status: /Civil Union     Spouse name: Not on file   • Number of children: Not on file   • Years of education: Not on file   • Highest education level: Not on file   Occupational History   • Not on file   Tobacco Use   • Smoking status: Never   • Smokeless tobacco: Never   Vaping Use   • Vaping Use: Not on file   Substance and Sexual Activity   • Alcohol use: Not Currently   • Drug use: Not Currently   • Sexual activity: Not Currently   Other Topics Concern   • Not on file   Social History Narrative   • Not on file     Social Determinants of Health     Financial Resource Strain: Not on file   Food Insecurity: No Food Insecurity (7/11/2023)    Hunger Vital Sign    • Worried About Running Out of Food in the Last Year: Never true    • Ran Out of Food in the Last Year: Never true   Transportation Needs: No Transportation Needs (7/11/2023)    PRAPARE - Transportation    • Lack of Transportation (Medical): No    • Lack of Transportation (Non-Medical):  No   Physical Activity: Not on file   Stress: Not on file   Social Connections: Not on file   Intimate Partner Violence: Not on file   Housing Stability: Low Risk  (7/11/2023)    Housing Stability Vital Sign    • Unable to Pay for Housing in the Last Year: No    • Number of Places Lived in the Last Year: 1    • Unstable Housing in the Last Year: No     Family History   Problem Relation Age of Onset   • Brain cancer Other        MEDICATIONS / ALLERGIES:  all current active meds have been reviewed and current meds:   Current Facility-Administered Medications   Medication Dose Route Frequency   • artificial tear (LUBRIFRESH P.M.) ophthalmic ointment   Both Eyes HS   • LORazepam (ATIVAN) injection 1 mg  1 mg Intravenous Q2H PRN   • morphine injection 2 mg  2 mg Intravenous Q30 Min PRN   • saliva substitute (MOUTH KOTE) mucosal solution 5 spray  5 spray Mouth/Throat 4x Daily PRN       No Known Allergies    OBJECTIVE:  /56   Pulse 84   Temp 97.8 °F (36.6 °C) (Oral)   Resp (!) 28   Ht 5' (1.524 m)   Wt 55.2 kg (121 lb 9.6 oz)   SpO2 91%   BMI 23.75 kg/m²   Nursing notes reviewed. Physical Exam  Vitals reviewed. Constitutional:       General: She is sleeping. Appearance: She is ill-appearing (acutely and chronically). Comments: Frail and debilitated. Eyes:      Comments: Eyes remained closed. Cardiovascular:      Rate and Rhythm: Normal rate. Pulmonary:      Effort: No respiratory distress. Comments: Breathing easily. Abdominal:      General: Abdomen is flat. There is no distension. Neurological:      Motor: Weakness present. Comments: Remained sleeping despite gentle verbal stimuli. Psychiatric:         Attention and Perception: She is inattentive. Speech: She is noncommunicative. Behavior: Behavior is not agitated or combative. Comments: Unable to fully assess. Lab Results: I have personally reviewed pertinent labs. HEMATOLOGY PROFILE:  Results from last 7 days   Lab Units 07/22/23 2215 07/22/23  2104 07/22/23  0611 07/20/23  0451 07/19/23  0523   WBC Thousand/uL 40.14* 48.45* 33.31*   < > 50.63*   HEMOGLOBIN g/dL 8.1* 9.4* 8.7*   < > 9.1*   HEMATOCRIT % 26.1* 30.0* 26.2*   < > 27.0*   PLATELETS Thousands/uL 357 391* 301   < > 379   MONO PCT % 2* 3*  --   --  5   EOS PCT % 0 0  --   --  0    < > = values in this interval not displayed.        CHEMISTRY PROFILE:  Results from last 7 days   Lab Units 07/22/23 2215 07/22/23  0544 07/21/23  0430 07/20/23  0451 07/19/23  0523 07/18/23  1427 07/18/23  0516   SODIUM mmol/L 144 142 143   < > 137 134* 135   POTASSIUM mmol/L 4.9 4.1 4.3   < > 5.4* 5.4* 5.6*   CHLORIDE mmol/L 114* 115* 113*   < > 111* 109* 106   CO2 mmol/L 22 23 22   < > 19* 19* 19*   BUN mg/dL 34* 35* 38*   < > 55* 52* 49*   CREATININE mg/dL 1.03 0.88 0.87   < > 1.15 1.17 1.21   ALBUMIN g/dL 2.1*  --   --   --  1.9* 1.6* 1.6*   CALCIUM mg/dL 8.4 8.6 8.3   < > 8.7 8.8 9.0   ALK PHOS U/L 129*  --   --   --  226*  --  278*   ALT U/L 33  --   --   --  38  --  36   AST U/L 42  --   --   --  50*  --  49*    < > = values in this interval not displayed. Albumin:  0   Lab Value Date/Time    ALB 2.1 (L) 07/22/2023 2214     Imaging Studies: I have personally reviewed pertinent reports. EKG, Pathology, and Other Studies: I have personally reviewed pertinent reports. Counseling / Coordination of Care: Total floor / unit time spent today 25+ minutes. Greater than 50% of total time was spent with the patient and / or family counseling and / or coordination of care. A description of the counseling / coordination of care: symptom assessment and management, medication review, medication adjustment, psychosocial support, chart review, imaging review, lab review, advanced directives, goals of care, hospice services, supportive listening and anticipatory guidance. Libia Coffman MD  [facility]  [phone]    Portions of this document may have been created using dictation software and as such some "sound alike" terms may have been generated by the system. Do not hesitate to contact me with any questions or clarifications.

## 2023-07-26 LAB
ACANTHOCYTES BLD QL SMEAR: PRESENT
ANISOCYTOSIS BLD QL SMEAR: PRESENT
EOSINOPHIL # BLD MANUAL: 0 THOUSAND/UL (ref 0–0.4)
ERYTHROCYTE [DISTWIDTH] IN BLOOD BY AUTOMATED COUNT: 14.8 % (ref 11.6–15.1)
HCT VFR BLD AUTO: 25.3 % (ref 34.8–46.1)
HGB BLD-MCNC: 8.1 G/DL (ref 11.5–15.4)
HYPERCHROMIA BLD QL SMEAR: PRESENT
LYMPHOCYTES # BLD AUTO: 12 % (ref 14–44)
LYMPHOCYTES # BLD AUTO: 9.16 THOUSAND/UL (ref 0.6–4.47)
MCH RBC QN AUTO: 28.6 PG (ref 26.8–34.3)
MCHC RBC AUTO-ENTMCNC: 32 G/DL (ref 31.4–37.4)
MCV RBC AUTO: 89 FL (ref 82–98)
METAMYELOCYTES NFR BLD MANUAL: 10 % (ref 0–1)
MONOCYTES # BLD AUTO: 0.35 THOUSAND/UL (ref 0–1.22)
MONOCYTES NFR BLD: 1 % (ref 4–12)
MYELOCYTES NFR BLD MANUAL: 6 % (ref 0–1)
NEUTROPHILS # BLD MANUAL: 19.02 THOUSAND/UL (ref 1.85–7.62)
NEUTS BAND NFR BLD MANUAL: 3 % (ref 0–8)
NEUTS SEG NFR BLD AUTO: 54 % (ref 43–75)
NRBC BLD AUTO-RTO: 2 /100 WBC (ref 0–2)
PATHOLOGY REVIEW: YES
PLATELET # BLD AUTO: 278 THOUSANDS/UL (ref 149–390)
PLATELET BLD QL SMEAR: ADEQUATE
PMV BLD AUTO: 9 FL (ref 8.9–12.7)
POIKILOCYTOSIS BLD QL SMEAR: PRESENT
POLYCHROMASIA BLD QL SMEAR: PRESENT
RBC # BLD AUTO: 2.83 MILLION/UL (ref 3.81–5.12)
RBC MORPH BLD: PRESENT
SMUDGE CELLS BLD QL SMEAR: PRESENT
TARGETS BLD QL SMEAR: PRESENT
TOTAL CELLS COUNTED SPEC: 100
VARIANT LYMPHS # BLD AUTO: 14 %
WBC # BLD AUTO: 35.22 THOUSAND/UL (ref 4.31–10.16)

## 2023-07-28 ENCOUNTER — HOME CARE VISIT (OUTPATIENT)
Dept: HOME HOSPICE | Facility: HOSPICE | Age: 88
End: 2023-07-28
Payer: MEDICARE

## 2023-07-28 NOTE — CASE COMMUNICATION
Faiza Encarnacion Nicol Ramos, Bereavement Final IDG [id number] FAMILY DECLINED BEREAVEMENT FOLLOW-UP DONE  : 3/12/1928  SOC: 23  DOD: 23  Diagnosis: Necrotizing Fasciitis   Primary: Daughter - Juan Sanz was a 80year old woman at the [facility]. Daughter Marsha Tan visited and shared her mother lived independently until 3 weeks ago with her first fall. Ragini's spouse of 79 years  in 2019 to dementia. Marsha Tan mentioned there are  five children. Marsha Tan and her four brothers. Son Mazin Spencer arrived and joined the conversation along with two of Ragini's grandchildren. Marsha Tan and son Meaghan Webb are the only children in-state. Marco Clayton was a member of [employer] many years ago. Family declined bereavement follow-up. Marsha Tan just wanted her mother to be comfortable. It was shared Marco Lopezsharon was able to tell Mazin Spencer she lived him and wished him a Happy Birthday (three days early). TOD: Marsha Tan and her  stayed until her mother left the [facility]. Son Mazin Spencer left a 1/2 hr earlier. Marsha Tan and Mazin Spencer were sad; grateful for their mothers care. Call Assignments: Family declined bereavement follow-up.

## 2023-08-01 LAB
MYCOBACTERIUM SPEC CULT: NORMAL
RHODAMINE-AURAMINE STN SPEC: NORMAL

## 2023-08-02 LAB — MISCELLANEOUS LAB TEST RESULT: NORMAL

## 2023-08-03 LAB — MISCELLANEOUS LAB TEST RESULT: NORMAL

## 2023-08-08 LAB
MYCOBACTERIUM SPEC CULT: NORMAL
RHODAMINE-AURAMINE STN SPEC: NORMAL

## 2023-08-14 LAB — FUNGUS SPEC CULT: NORMAL

## 2023-08-15 LAB
MYCOBACTERIUM SPEC CULT: NORMAL
RHODAMINE-AURAMINE STN SPEC: NORMAL

## 2023-08-21 LAB — FUNGUS SPEC CULT: NORMAL

## 2023-08-22 LAB
MYCOBACTERIUM SPEC CULT: NORMAL
RHODAMINE-AURAMINE STN SPEC: NORMAL

## 2023-08-28 LAB
MYCOBACTERIUM SPEC CULT: NORMAL
RHODAMINE-AURAMINE STN SPEC: NORMAL

## 2023-09-06 LAB
MYCOBACTERIUM SPEC CULT: NORMAL
RHODAMINE-AURAMINE STN SPEC: NORMAL

## (undated) DEVICE — INTENDED FOR TISSUE SEPARATION, AND OTHER PROCEDURES THAT REQUIRE A SHARP SURGICAL BLADE TO PUNCTURE OR CUT.: Brand: BARD-PARKER SAFETY BLADES SIZE 11, STERILE

## (undated) DEVICE — TROCAR: Brand: KII SLEEVE

## (undated) DEVICE — GLOVE SRG BIOGEL ORTHOPEDIC 7.5

## (undated) DEVICE — 3000CC GUARDIAN II: Brand: GUARDIAN

## (undated) DEVICE — PAD GROUNDING ADULT

## (undated) DEVICE — SUT ETHILON 3-0 FS-1 18 IN 663G

## (undated) DEVICE — OSTO POUCH DRAINABLE W/FILTER 3/4 X 12IN ESTEEM

## (undated) DEVICE — SPONGE PACKING 4.5 X 22 IN STRL X-RAY DETECT

## (undated) DEVICE — SPONGE LAP 18 X 18 IN STRL RFD

## (undated) DEVICE — POOLE SUCTION HANDLE: Brand: CARDINAL HEALTH

## (undated) DEVICE — ABDOMINAL PAD: Brand: DERMACEA

## (undated) DEVICE — TROCAR: Brand: KII FIOS FIRST ENTRY

## (undated) DEVICE — ANTIBACTERIAL UNDYED BRAIDED (POLYGLACTIN 910), SYNTHETIC ABSORBABLE SUTURE: Brand: COATED VICRYL

## (undated) DEVICE — TUBING SUCTION 5MM X 12 FT

## (undated) DEVICE — KERLIX BANDAGE ROLL: Brand: KERLIX

## (undated) DEVICE — PREMIUM DRY TRAY LF: Brand: MEDLINE INDUSTRIES, INC.

## (undated) DEVICE — Device

## (undated) DEVICE — LIGACLIP MCA MULTIPLE CLIP APPLIERS, 20 MEDIUM CLIPS: Brand: LIGACLIP

## (undated) DEVICE — HANDPIECE SET WITH RETRACTABLE COAXIAL FAN SPRAY TIP AND SUCTION TUBE: Brand: INTERPULSE

## (undated) DEVICE — SUT VICRYL PLUS 0 UR-6 27IN VCP603H

## (undated) DEVICE — TIBURON SPLIT SHEET: Brand: CONVERTORS

## (undated) DEVICE — SUT MONOCRYL PLUS 4-0 PS-2 18 IN MCP496G

## (undated) DEVICE — INTENDED FOR TISSUE SEPARATION, AND OTHER PROCEDURES THAT REQUIRE A SHARP SURGICAL BLADE TO PUNCTURE OR CUT.: Brand: BARD-PARKER SAFETY BLADES SIZE 15, STERILE

## (undated) DEVICE — CHLORAPREP HI-LITE 26ML ORANGE

## (undated) DEVICE — GLOVE SRG BIOGEL ECLIPSE 8.5

## (undated) DEVICE — PACK PBDS LAP CHOLE RF

## (undated) DEVICE — GLOVE INDICATOR PI UNDERGLOVE SZ 9 BLUE

## (undated) DEVICE — MEDI-VAC YANK SUCT HNDL W/TPRD BULBOUS TIP: Brand: CARDINAL HEALTH

## (undated) DEVICE — UNDER BUTTOCKS DRAPE W/FLUID CONTROL POUCH: Brand: CONVERTORS

## (undated) DEVICE — TROCAR: Brand: KII® SLEEVE

## (undated) DEVICE — BULB SYRINGE,IRRIGATION WITH PROTECTIVE CAP: Brand: DOVER

## (undated) DEVICE — ADHESIVE SKIN HIGH VISCOSITY EXOFIN 1ML

## (undated) DEVICE — PLUMEPEN PRO 10FT

## (undated) DEVICE — BETHLEHEM UNIVERSAL OUTPATIENT: Brand: CARDINAL HEALTH

## (undated) DEVICE — GLOVE SRG BIOGEL 6

## (undated) DEVICE — GLOVE INDICATOR UNDERGLOVE SZ 6 BLUE

## (undated) DEVICE — PULSAVAC TIP FAN SPRAY W/SHIELD

## (undated) DEVICE — 3M™ IOBAN™ 2 ANTIMICROBIAL INCISE DRAPE 6650EZ: Brand: IOBAN™ 2

## (undated) DEVICE — GAUZE SPONGES,16 PLY: Brand: CURITY